# Patient Record
Sex: MALE | HISPANIC OR LATINO | Employment: FULL TIME | URBAN - METROPOLITAN AREA
[De-identification: names, ages, dates, MRNs, and addresses within clinical notes are randomized per-mention and may not be internally consistent; named-entity substitution may affect disease eponyms.]

---

## 2017-01-20 ENCOUNTER — TRANSFERRED RECORDS (OUTPATIENT)
Dept: HEALTH INFORMATION MANAGEMENT | Facility: CLINIC | Age: 47
End: 2017-01-20

## 2017-01-23 ENCOUNTER — HOSPITAL ENCOUNTER (OUTPATIENT)
Dept: PHYSICAL THERAPY | Facility: CLINIC | Age: 47
Setting detail: THERAPIES SERIES
End: 2017-01-23
Attending: PHYSICIAN ASSISTANT
Payer: COMMERCIAL

## 2017-01-23 PROCEDURE — 40000718 ZZHC STATISTIC PT DEPARTMENT ORTHO VISIT: Performed by: PHYSICAL THERAPIST

## 2017-01-23 PROCEDURE — 97162 PT EVAL MOD COMPLEX 30 MIN: CPT | Mod: GP | Performed by: PHYSICAL THERAPIST

## 2017-01-23 PROCEDURE — 97110 THERAPEUTIC EXERCISES: CPT | Mod: GP | Performed by: PHYSICAL THERAPIST

## 2017-01-23 PROCEDURE — 97140 MANUAL THERAPY 1/> REGIONS: CPT | Mod: GP | Performed by: PHYSICAL THERAPIST

## 2017-02-03 ENCOUNTER — HOSPITAL ENCOUNTER (OUTPATIENT)
Dept: PHYSICAL THERAPY | Facility: CLINIC | Age: 47
Setting detail: THERAPIES SERIES
End: 2017-02-03
Attending: PHYSICIAN ASSISTANT
Payer: COMMERCIAL

## 2017-02-03 PROCEDURE — 97140 MANUAL THERAPY 1/> REGIONS: CPT | Mod: GP | Performed by: PHYSICAL THERAPIST

## 2017-02-03 PROCEDURE — 40000718 ZZHC STATISTIC PT DEPARTMENT ORTHO VISIT: Performed by: PHYSICAL THERAPIST

## 2017-02-10 ENCOUNTER — HOSPITAL ENCOUNTER (OUTPATIENT)
Dept: PHYSICAL THERAPY | Facility: CLINIC | Age: 47
Setting detail: THERAPIES SERIES
End: 2017-02-10
Attending: PHYSICIAN ASSISTANT
Payer: COMMERCIAL

## 2017-02-10 PROCEDURE — 97140 MANUAL THERAPY 1/> REGIONS: CPT | Mod: GP | Performed by: PHYSICAL THERAPIST

## 2017-02-10 PROCEDURE — 40000718 ZZHC STATISTIC PT DEPARTMENT ORTHO VISIT: Performed by: PHYSICAL THERAPIST

## 2017-02-27 ENCOUNTER — OFFICE VISIT (OUTPATIENT)
Dept: FAMILY MEDICINE | Facility: CLINIC | Age: 47
End: 2017-02-27
Payer: COMMERCIAL

## 2017-02-27 VITALS
BODY MASS INDEX: 24.8 KG/M2 | DIASTOLIC BLOOD PRESSURE: 89 MMHG | HEIGHT: 67 IN | WEIGHT: 158 LBS | HEART RATE: 104 BPM | SYSTOLIC BLOOD PRESSURE: 134 MMHG | TEMPERATURE: 98 F

## 2017-02-27 DIAGNOSIS — M54.42 LEFT-SIDED LOW BACK PAIN WITH LEFT-SIDED SCIATICA, UNSPECIFIED CHRONICITY: Primary | ICD-10-CM

## 2017-02-27 DIAGNOSIS — F33.1 MAJOR DEPRESSIVE DISORDER, RECURRENT EPISODE, MODERATE (H): ICD-10-CM

## 2017-02-27 DIAGNOSIS — M25.512 BILATERAL SHOULDER PAIN, UNSPECIFIED CHRONICITY: ICD-10-CM

## 2017-02-27 DIAGNOSIS — M25.511 BILATERAL SHOULDER PAIN, UNSPECIFIED CHRONICITY: ICD-10-CM

## 2017-02-27 DIAGNOSIS — F07.81 POSTCONCUSSION SYNDROME: ICD-10-CM

## 2017-02-27 DIAGNOSIS — M54.6 BILATERAL THORACIC BACK PAIN, UNSPECIFIED CHRONICITY: ICD-10-CM

## 2017-02-27 DIAGNOSIS — T14.90XA TRAUMATIC INJURY: ICD-10-CM

## 2017-02-27 DIAGNOSIS — M54.2 NECK PAIN: ICD-10-CM

## 2017-02-27 PROCEDURE — 99215 OFFICE O/P EST HI 40 MIN: CPT | Performed by: PHYSICIAN ASSISTANT

## 2017-02-27 RX ORDER — DULOXETIN HYDROCHLORIDE 30 MG/1
30 CAPSULE, DELAYED RELEASE ORAL DAILY
Qty: 30 CAPSULE | Refills: 0 | Status: SHIPPED | OUTPATIENT
Start: 2017-02-27 | End: 2017-11-20

## 2017-02-27 RX ORDER — AMITRIPTYLINE HYDROCHLORIDE 50 MG/1
50 TABLET ORAL AT BEDTIME
Qty: 30 TABLET | Refills: 11 | Status: SHIPPED | OUTPATIENT
Start: 2017-02-27 | End: 2019-01-21

## 2017-02-27 ASSESSMENT — ANXIETY QUESTIONNAIRES
1. FEELING NERVOUS, ANXIOUS, OR ON EDGE: SEVERAL DAYS
5. BEING SO RESTLESS THAT IT IS HARD TO SIT STILL: SEVERAL DAYS
2. NOT BEING ABLE TO STOP OR CONTROL WORRYING: NOT AT ALL
3. WORRYING TOO MUCH ABOUT DIFFERENT THINGS: SEVERAL DAYS
7. FEELING AFRAID AS IF SOMETHING AWFUL MIGHT HAPPEN: MORE THAN HALF THE DAYS
6. BECOMING EASILY ANNOYED OR IRRITABLE: SEVERAL DAYS
GAD7 TOTAL SCORE: 7

## 2017-02-27 ASSESSMENT — PATIENT HEALTH QUESTIONNAIRE - PHQ9: 5. POOR APPETITE OR OVEREATING: SEVERAL DAYS
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
## 2017-02-27 NOTE — NURSING NOTE
"Chief Complaint   Patient presents with     Back Pain       Initial /89 (BP Location: Right arm, Patient Position: Chair, Cuff Size: Adult Regular)  Pulse 104  Temp 98  F (36.7  C) (Tympanic)  Ht 5' 7\" (1.702 m)  Wt 158 lb (71.7 kg)  BMI 24.75 kg/m2 Estimated body mass index is 24.75 kg/(m^2) as calculated from the following:    Height as of this encounter: 5' 7\" (1.702 m).    Weight as of this encounter: 158 lb (71.7 kg).  Medication Reconciliation: complete    Health Maintenance that is potentially due pending provider review:  NONE    Deisy SANTACRUZ MA        "

## 2017-02-27 NOTE — PATIENT INSTRUCTIONS
Increase dose of amitriptyline - helps sleep, pain and tight muscles  Continue with Physical Therapy   Set up MRI   Await call to set up appt with physiatrist (back specialist) in [place]  See me in about a month to further see what we can do to get you better

## 2017-02-28 ASSESSMENT — PATIENT HEALTH QUESTIONNAIRE - PHQ9: SUM OF ALL RESPONSES TO PHQ QUESTIONS 1-9: 21

## 2017-02-28 ASSESSMENT — ANXIETY QUESTIONNAIRES: GAD7 TOTAL SCORE: 7

## 2017-03-03 ENCOUNTER — HOSPITAL ENCOUNTER (OUTPATIENT)
Dept: MRI IMAGING | Facility: CLINIC | Age: 47
Discharge: HOME | End: 2017-03-03
Attending: PHYSICIAN ASSISTANT | Admitting: PHYSICIAN ASSISTANT
Payer: COMMERCIAL

## 2017-03-03 DIAGNOSIS — M54.42 LEFT-SIDED LOW BACK PAIN WITH LEFT-SIDED SCIATICA, UNSPECIFIED CHRONICITY: ICD-10-CM

## 2017-03-03 PROCEDURE — 72148 MRI LUMBAR SPINE W/O DYE: CPT

## 2017-03-06 NOTE — PROGRESS NOTES
[facility],    Your MRI of your back looks ok.  Some mild arthritis changes.  Keep appointment with the specialist.    Please call clinic at [phone] if you have questions.    Abigail Gregory PA-C

## 2017-03-07 NOTE — PROGRESS NOTES
Called patient, advised of results and recommendations.  Patient understood.  He did see the specialist, was stated he has a dislocated pelvis.  Is going to be doing some PT and will also be sent to another specialist in [place].  Deisy SANTACRUZ MA

## 2017-03-17 ENCOUNTER — HOSPITAL ENCOUNTER (OUTPATIENT)
Dept: PHYSICAL THERAPY | Facility: CLINIC | Age: 47
Setting detail: THERAPIES SERIES
End: 2017-03-17
Attending: PHYSICIAN ASSISTANT
Payer: COMMERCIAL

## 2017-03-17 PROCEDURE — 40000718 ZZHC STATISTIC PT DEPARTMENT ORTHO VISIT: Performed by: PHYSICAL THERAPIST

## 2017-03-17 PROCEDURE — 97535 SELF CARE MNGMENT TRAINING: CPT | Mod: GP | Performed by: PHYSICAL THERAPIST

## 2017-03-17 PROCEDURE — 97140 MANUAL THERAPY 1/> REGIONS: CPT | Mod: GP | Performed by: PHYSICAL THERAPIST

## 2017-03-31 ENCOUNTER — HOSPITAL ENCOUNTER (OUTPATIENT)
Dept: PHYSICAL THERAPY | Facility: CLINIC | Age: 47
Setting detail: THERAPIES SERIES
End: 2017-03-31
Attending: PHYSICIAN ASSISTANT
Payer: COMMERCIAL

## 2017-03-31 PROCEDURE — 40000718 ZZHC STATISTIC PT DEPARTMENT ORTHO VISIT: Performed by: PHYSICAL THERAPIST

## 2017-03-31 PROCEDURE — 97140 MANUAL THERAPY 1/> REGIONS: CPT | Mod: GP | Performed by: PHYSICAL THERAPIST

## 2017-03-31 PROCEDURE — 97110 THERAPEUTIC EXERCISES: CPT | Mod: GP | Performed by: PHYSICAL THERAPIST

## 2017-03-31 PROCEDURE — 97162 PT EVAL MOD COMPLEX 30 MIN: CPT | Mod: GP | Performed by: PHYSICAL THERAPIST

## 2017-03-31 PROCEDURE — 97161 PT EVAL LOW COMPLEX 20 MIN: CPT | Mod: GP | Performed by: PHYSICAL THERAPIST
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
## 2017-05-15 ENCOUNTER — HOSPITAL ENCOUNTER (OUTPATIENT)
Dept: RADIOLOGY | Facility: CLINIC | Age: 47
Discharge: HOME | End: 2017-05-15
Attending: PHYSICAL MEDICINE & REHABILITATION | Admitting: PHYSICAL MEDICINE & REHABILITATION
Payer: COMMERCIAL

## 2017-05-15 VITALS — OXYGEN SATURATION: 98 % | DIASTOLIC BLOOD PRESSURE: 96 MMHG | SYSTOLIC BLOOD PRESSURE: 133 MMHG | HEART RATE: 84 BPM

## 2017-05-15 DIAGNOSIS — M47.9 SPONDYLARTHROSIS: ICD-10-CM

## 2017-05-15 PROCEDURE — 25500064 ZZH RX 255 OP 636: Performed by: NURSE ANESTHETIST, CERTIFIED REGISTERED

## 2017-05-15 PROCEDURE — 27210202

## 2017-05-15 PROCEDURE — S0020 INJECTION, BUPIVICAINE HYDRO: HCPCS | Performed by: NURSE ANESTHETIST, CERTIFIED REGISTERED

## 2017-05-15 PROCEDURE — 25000125 ZZHC RX 250: Performed by: NURSE ANESTHETIST, CERTIFIED REGISTERED

## 2017-05-15 RX ORDER — BUPIVACAINE HYDROCHLORIDE 7.5 MG/ML
5 INJECTION, SOLUTION EPIDURAL; RETROBULBAR ONCE
Status: COMPLETED | OUTPATIENT
Start: 2017-05-15 | End: 2017-05-15

## 2017-05-15 RX ORDER — IOPAMIDOL 408 MG/ML
10 INJECTION, SOLUTION INTRATHECAL ONCE
Status: COMPLETED | OUTPATIENT
Start: 2017-05-15 | End: 2017-05-15

## 2017-05-15 RX ORDER — LIDOCAINE HYDROCHLORIDE 10 MG/ML
5 INJECTION, SOLUTION EPIDURAL; INFILTRATION; INTRACAUDAL; PERINEURAL ONCE
Status: COMPLETED | OUTPATIENT
Start: 2017-05-15 | End: 2017-05-15

## 2017-05-15 RX ORDER — DEXAMETHASONE SODIUM PHOSPHATE 10 MG/ML
20 INJECTION, SOLUTION INTRAMUSCULAR; INTRAVENOUS ONCE
Status: COMPLETED | OUTPATIENT
Start: 2017-05-15 | End: 2017-05-15

## 2017-05-15 RX ADMIN — DEXAMETHASONE SODIUM PHOSPHATE 10 MG: 10 INJECTION, SOLUTION INTRAMUSCULAR; INTRAVENOUS at 14:36

## 2017-05-15 RX ADMIN — IOPAMIDOL 1 ML: 408 INJECTION, SOLUTION INTRATHECAL at 14:36

## 2017-05-15 RX ADMIN — BUPIVACAINE HYDROCHLORIDE 1.5 ML: 7.5 INJECTION, SOLUTION EPIDURAL; RETROBULBAR at 14:36

## 2017-05-15 RX ADMIN — LIDOCAINE HYDROCHLORIDE 50 MG: 10 INJECTION, SOLUTION EPIDURAL; INFILTRATION; INTRACAUDAL; PERINEURAL at 14:36

## 2017-05-16 NOTE — OP NOTE
DATE OF PROCEDURE:  05/15/2017      POSTOPERATIVE DIAGNOSIS:  Cervical spondylosis.      POST-PROCEDURE DIAGNOSIS:  Cervical spondylosis.      CURRENT SYMPTOMS:  Dnan Castillo is a 46-year-old gentleman with right-sided upper neck pain and headaches.  He presents today at the request of his physician, Dr. Jatinder Oh for right third occipital nerve block and right C3 and C4 medial branch nerve blocks.      Today patient's pain in the upper neck and right occipital region is 9/10.      DESCRIPTION OF PROCEDURE:  In  the prone position after sterile chlorhexidine, alcohol and Betadine solution prep, the skin was localized with 1% preservative-free Xylocaine.  The 3.5-inch 22-gauge spinal needles were inserted into the bony landmarks identifying the course of the right third occipital nerve and the right C3 and right C4 medial branch nerves.  Correct needle position was confirmed in AP and lateral views.  Following negative aspiration for CSF, heme, and with no paresthesia, a small amount of Isovue 200 M was injected with good field block and no vascular uptake  A 0.5  mL of 0.75% preservative-free bupivacaine was then injected at each of the 3 sites.  Approximately 3.3 mg of preservative-free dexamethasone was also injected at each of the 3 sites.  Needles were removed and sterile bandage was applied.  Patient tolerated the procedure well.      Discharge instructions were thoroughly reviewed and a written copy of those instructions was sent home with him.      CONCLUSION:  Right third occipital nerve block and right C3 and C4 medial branch nerve blocks under fluoroscopic guidance.      Again, patient's preprocedure upper neck pain on the right and occipital headache were rated at a 9/10 prior to the procedure.      Immediately following the procedure, he stated his headache was gone.  He rated his pain at 0/10.  He was instructed to keep detailed pain diary documenting the local anesthetic phase of the injection every  hour for the rest of the afternoon and evening and report his results to Dr. Oh at [facility].         MATEO SUH CRNA             D: 05/15/2017 14:41   T: 2017 00:17   MT: [id number]      Name:     ROSY ANDERSON   MRN:      [id number]        Account:        [id number]   :      1970           Procedure Date: 05/15/2017      Document: [id number]       cc: Jatinder Oh MD
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
## 2017-06-05 NOTE — PROGRESS NOTES
Outpatient Physical Therapy Discharge Note     Patient: Dann Castillo  : 1970    Beginning/End Dates of Reporting Period:  17 to 2017    Referring Provider: CARLOS Cornelius    Therapy Diagnosis: neck pain     Client Self Report: Pt reports LBP is still not good. It gave out on him on  and he fell on R side. 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 He reports he heard a pop and now his knee is giving out on him. Pt reports pelvis is  and he may need surgery.  He does have an order for PT. Also supposed to have an injection in low back. Upper back and mid back is not nearly as bad as the rest of his body. Pt reports he does have WC hearing on 3/24/17    Objective Measurements:  Objective Measure: cervical ROM  Details: R rot:  60 L rot 65 - visual estimate     Objective Measure: NDI     Objective Measure: UE ROM  Details: Flex: 160. abd 170     Objective Measure: qucik ligament screen  Details: anterior drawer: pos, posterior drawer: neg, varus: pos, valgus: pos                      Goals:  Goal Identifier sleeping - stomach    Goal Description Pt will wake up less than 2x/ per night due to neck/back pain in order to get a restful night's sleep.   Target Date 17   Date Met      Progress:not met     Goal Identifier ROM   Goal Description Pt will demonstrate 75 deg bi of cervical rot in order to be able to safely turn head to drive car safely.    Target Date 17   Date Met      Progress:not met     Goal Identifier HA   Goal Description Pt will report having less than 1 HA/migraine per week to return to PLOF and improve concentration.    Target Date 17   Date Met      Progress:not assessed at last visit      Goal Identifier NDI   Goal Description Pt will report <10% disability on NDI to demonstrate improved ability to complete daily activities and demonstrate significant clinical improvement   Target Date 17   Date  Met      Progress:not assessed at last visit        Progress Toward Goals:   Progress this reporting period: Pt was seen for 3 visits in physical therapy over this POC. Objective measures are all taken from last visit. Current status is unknown at this time. Pt has failed to schedule f/u visits within 30 days from last visit as instructed thus is being d/c from therapy at this time. Per chart review, pt did have cervical injection.         Plan:  Discharge from therapy.    Discharge:    Reason for Discharge: Patient has failed to schedule further appointments.    Equipment Issued: NA    Discharge Plan: Patient to continue home program.    Jacqueline Ruiz  Physical Therapist  [facility]  [address] [place] [id number]  [email or web address]   [email or web address]   Office: [phone] Fax: [phone]

## 2017-06-05 NOTE — PROGRESS NOTES
Outpatient Physical Therapy Discharge Note     Patient: Dann Castillo  : 1970    Evaluation date:  3/31/17    Referring Provider: Abigail Gregory PA-C    Therapy Diagnosis:    decreased strength, limited ROM and pain in LBP associated w mechanical dysfcnction         Client Self Report:   Current status is unknown since patient did not return for further PT visits.    Objective Measurements:  Current objective status is unknown since patient failed to complete treatment     Goals:  Goal Identifier lumbar ROM   Goal Description Pt will be able to demonstrate full lumbar ROM in order to be able to  object off of the ground without pain or radicular symptoms.   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    Target Date 17   Date Met      Progress:unable to assess as pt failed to schedule f/u visits       Progress Toward Goals:   Progress this reporting period: Progress this reporting period: Pt has failed to schedule f/u visits within 30 days from last visit thus is being d/c from therapy at this time. Objective measures are all taken from last visit. Current status is unknown at this time.          Plan:  Discharge from therapy.    Discharge:    Reason for Discharge: Patient has failed to schedule further appointments.    Equipment Issued: none    Discharge Plan:  Not completed since patient failed to schedule further appointments.    Jacqueline Ruiz  Physical Therapist  #[id number]  [address] [address]  [email or web address]   [email or web address]   Office: [phone] Fax: [phone]

## 2017-08-08 ENCOUNTER — TELEPHONE (OUTPATIENT)
Dept: ANESTHESIOLOGY | Facility: CLINIC | Age: 47
End: 2017-08-08

## 2017-08-08 NOTE — TELEPHONE ENCOUNTER
LPN read through the Pre-procedure instructions with pt.   Pt verbalized understanding to holding appropriate medication per protocol, and was agreeable to NPO policy and needing a .  Pt informed to hold Aleve and Excedrin for 7 days before procedure per protocol.   Address was confirmed and instructions were placed in the mail to pt.   Pt was asked to contact the clinic after they received the instructions in the mail, if they were to have any questions.     Britney Clark LPN

## 2017-08-17 DIAGNOSIS — M47.812 CERVICAL SPONDYLOSIS WITHOUT MYELOPATHY: Primary | ICD-10-CM

## 2017-08-25 ENCOUNTER — SURGERY (OUTPATIENT)
Age: 47
End: 2017-08-25

## 2017-08-25 ENCOUNTER — HOSPITAL ENCOUNTER (OUTPATIENT)
Facility: AMBULATORY SURGERY CENTER | Age: 47
End: 2017-08-25
Attending: ANESTHESIOLOGY

## 2017-08-25 VITALS
HEIGHT: 67 IN | WEIGHT: 159 LBS | DIASTOLIC BLOOD PRESSURE: 79 MMHG | OXYGEN SATURATION: 98 % | SYSTOLIC BLOOD PRESSURE: 111 MMHG | TEMPERATURE: 97.5 F | HEART RATE: 66 BPM | BODY MASS INDEX: 24.96 KG/M2 | RESPIRATION RATE: 16 BRPM

## 2017-08-25 RX ORDER — BUPIVACAINE HYDROCHLORIDE 2.5 MG/ML
INJECTION, SOLUTION INFILTRATION; PERINEURAL PRN
Status: DISCONTINUED | OUTPATIENT
Start: 2017-08-25 | End: 2017-08-25 | Stop reason: HOSPADM

## 2017-08-25 RX ORDER — LIDOCAINE HYDROCHLORIDE 10 MG/ML
INJECTION, SOLUTION EPIDURAL; INFILTRATION; INTRACAUDAL; PERINEURAL PRN
Status: DISCONTINUED | OUTPATIENT
Start: 2017-08-25 | End: 2017-08-25 | Stop reason: HOSPADM

## 2017-08-25 RX ORDER — NALOXONE HYDROCHLORIDE 0.4 MG/ML
.1-.4 INJECTION, SOLUTION INTRAMUSCULAR; INTRAVENOUS; SUBCUTANEOUS
Status: DISCONTINUED | OUTPATIENT
Start: 2017-08-25 | End: 2017-08-26 | Stop reason: HOSPADM

## 2017-08-25 RX ORDER — FLUMAZENIL 0.1 MG/ML
0.2 INJECTION, SOLUTION INTRAVENOUS
Status: DISCONTINUED | OUTPATIENT
Start: 2017-08-25 | End: 2017-08-26 | Stop reason: HOSPADM

## 2017-08-25 RX ORDER — FENTANYL CITRATE 50 UG/ML
25-50 INJECTION, SOLUTION INTRAMUSCULAR; INTRAVENOUS EVERY 5 MIN PRN
Status: DISCONTINUED | OUTPATIENT
Start: 2017-08-25 | End: 2017-08-26 | Stop reason: HOSPADM

## 2017-08-25 RX ADMIN — FENTANYL CITRATE 25 MCG: 50 INJECTION, SOLUTION INTRAMUSCULAR; INTRAVENOUS at 08:46

## 2017-08-25 RX ADMIN — LIDOCAINE HYDROCHLORIDE 10 ML: 10 INJECTION, SOLUTION EPIDURAL; INFILTRATION; INTRACAUDAL; PERINEURAL at 09:03

## 2017-08-25 RX ADMIN — BUPIVACAINE HYDROCHLORIDE 3 ML: 2.5 INJECTION, SOLUTION INFILTRATION; PERINEURAL at 09:04

## 2017-08-25 NOTE — IP AVS SNAPSHOT
[facility]    [address] [address]    Phone:  [phone]    Fax:  [phone]                                       After Visit Summary   8/25/2017    Ed Jonathan    MRN: [id number]           After Visit Summary Signature Page     I have received my discharge instructions, and my questions have been answered. I have discussed any challenges I see with this plan with the nurse or doctor.    ..........................................................................................................................................  Patient/Patient Representative Signature      ..........................................................................................................................................  Patient Representative Print Name and Relationship to Patient    ..................................................               ................................................  Date                                            Time    ..........................................................................................................................................  Reviewed by Signature/Title    ...................................................              ..............................................  Date                                                            Time

## 2017-08-25 NOTE — H&P
"Abbreviated History and Physical    Patient Name: Dann Castillo  YOB: 1970    Reason for Procedure: cervicalgia    History:    History reviewed. No pertinent past medical history.    History of obstructive sleep apnea? no    History of problems with sedation? no    Physical exam:  /79  Pulse 66  Temp 97.5  F (36.4  C) (Temporal)  Resp 16  Ht 1.702 m (5' 7\")  Wt 72.1 kg (159 lb)  SpO2 98%  BMI 24.9 kg/m2  General: in no apparent distress   Neuro: At least antigravity strength noted in all 4 extremities  Respiratory: Clear to ausculation bilaterally   Cardiac: Regular rate and rhythm  Skin: No rashes or lesions on exposed areas of skin    Medications:   Current Outpatient Prescriptions   Medication     amitriptyline (ELAVIL) 50 MG tablet     DULoxetine (CYMBALTA) 30 MG EC capsule     nabumetone (RELAFEN) 750 MG tablet     Naproxen Sodium (ALEVE PO)     HYDROcodone-acetaminophen (NORCO) 5-325 MG per tablet     hydrOXYzine (ATARAX) 25 MG tablet     sildenafil (VIAGRA) 100 MG tablet     cyclobenzaprine (FLEXERIL) 10 MG tablet     traMADol (ULTRAM) 50 MG tablet     acetaminophen-caffeine (EXCEDRIN TENSION HEADACHE) 500-65 MG TABS     fexofenadine (ALLEGRA) 180 MG tablet     diphenhydrAMINE-zinc acetate (BENADRYL) cream     cetirizine (ZYRTEC) 10 MG tablet     albuterol-ipratropium (COMBIVENT)  MCG/ACT inhaler     Menthol, Topical Analgesic, (BENGAY EX)     EPINEPHrine (EPIPEN) 0.3 MG/0.3ML injection     Current Facility-Administered Medications   Medication     midazolam (VERSED) injection 0.5-1 mg     flumazenil (ROMAZICON) injection 0.2 mg     fentaNYL (PF) (SUBLIMAZE) injection 25-50 mcg     naloxone (NARCAN) injection 0.1-0.4 mg       Allergies:     Allergies   Allergen Reactions     Nka [No Known Allergies]        ASA Classification: 2    OK for local anesthetic use.     Art Car MD  Pain Medicine  [facility] Department of Anesthesia  8/25/2017        "
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
## 2017-08-25 NOTE — OP NOTE
Patient: Dann Castillo Age: 47 year old   MRN: [id number] Attending: Dr. Car     Date of Visit: August 25, 2017      PAIN MEDICINE CLINIC PROCEDURE NOTE    ATTENDING CLINICIAN:    Art Car MD    ASSISTANT CLINICIAN:  Moo Lilly DO    PREPROCEDURE DIAGNOSES:  1.  Cervical facet arthropathy   2.  Cervicalgia  3.  Neck pain       POSTPROCEDURE DIAGNOSES:  1.  Cervical facet arthropathy   2.  Cervicalgia  3.  Neck pain       PROCEDURE(S) PERFORMED:  1.  C3, C4 medial branch nerve radiofrequency ablation    2.  Third occipital nerve block radiofrequency ablation   3.  Fluoroscopic guidance for the above procedures    ANESTHESIA:  Local.    BLOOD LOSS:  Minimal.    DRAINS AND SPECIMENS:  None.    COMPLICATIONS:  None.    INDICATIONS:  Dann Castillo is a 47 year old male with a history of cervical facet arthropathy. Recent MRI shows at C2-C3 There is facet arthropathy bilaterally, uncinate hypertrophy bilaterally and a posterior broad-based disc-osteophyte complex. There is no spinal canal or neural foraminal stenosis at this level. At C3-C4: There is facet arthropathy bilaterally, uncinate hypertrophy bilaterally and a posterior broad-based disc-osteophyte complex. There is no spinal canal or neural foraminal stenosis at this level. At C4-C5: There is facet arthropathy bilaterally, uncinate hypertrophy bilaterally and a posterior broad-based disc-osteophyte complex. These findings result in mild bilateral neural foraminal narrowing but no spinal canal stenosis. She has good pain relief with cervical medial branch nerve block performed at [facility]SH .  The patient stated that the patient was in their usual state of health and denied recent anticoagulant use or recent infections.  Therefore, the plan is to perform above mentioned procedures.     Procedure Details:  The patient was met in the procedure room, where the patient was identified by name, medical record number and date of birth.  All of the patient s last minute  questions were answered. Written informed consent was obtained and saved in the electronic medical record, after the risks, benefits, and alternatives were discussed with the patient.      A formal time-out procedure was performed as per protocol, including patient name, title of procedure, and site of procedure, and all in the room concurred.  Routine monitors were applied.      The patient was placed in the prone position on the procedure room table.  All pressure points were checked and comfortably padded.  Routine monitors were placed.  Vital signs were stable.    A chlorhexidine prep was completed followed by sterile draping per standard procedure.     Using C-arm AP fluoroscopy we identified the C2, C3, and C4 articular pillars on the right. Using a 25G, 1.5 inch needle, the skin was anesthetized over the planned insertion sites. Then a 20G, 100-mm radiofrequency cannula- 10mm active tip was advanced percutaneously from a posterior approach until the needle tip contacted the lateral aspect of the C4 articular pillar. This was confirmed on AP views under flouroscopy. The needle was then advanced to the center of the  articular pillar along the C4 medial branch nerve. This was confirmed with lateral views under flouroscopy. We then repeated the above placement of another needle at the C3 level. The third occipital nerve was targeted in a similar fashion at the C2/3 facet joint line.  We then checked the sensory stimulation. The stimulator was set to 50 hz and the voltage was slowly increased from 0 to 1.0 volts until pain/sensation was reproduced in the pt's neck. Once the sensory test was passed, the motor testing was performed. The hertz was adjusted to 2 and the voltage was slowly increased from 0 to 3.0 while monitoring the patients arm and face for any twitching/movement/pain. Once this was confirmed of no twitching in the arm or face and only in the multifidus muscle in the neck, then Lidocaine 2% and  bupivacaine 0.5% 50:50 mix, 0.5ml was injected and the probe replaced.  The probes were then heated to 80C for 90 seconds. The needles were then rotated 180 degrees and again heated to 80C for 90 seconds. After RF ablation, we injected 1 ml of solution containing 1 ml of 10mg/ml dexamethasone and 3 ml of 0.25% bupivacaine. The needles were removed. The skin was cleaned and band-aids were placed.   Light pressure was held at the puncture site(s) to prevent ecchymosis and oozing.  The patient's skin was cleansed, and hemostasis was confirmed.  Band-aids were applied to the needle injection site(s).      Condition:    The patient remained awake and alert throughout the procedure.  The patient tolerated the procedure well and was monitored for approximately 15 minutes afterward in the post procedure area.  There were no immediate post procedure complications noted.  The patient was then discharged to home as per protocol.  The patient will follow up in the outpatient clinic in 4 week(s), unless otherwise clinically indicated.    Patient condition  stable.    Pre-procedure pain score: 6/10  Post-procedure pain score: 0/10        Sedation:      Performed by: Art Car    Indication:  Sedation is required to allow above mentioned procedure     Consent:  Consent obtained from the patient Ed Jonathan after discussing the risks, benefits and alternatives.    PO Intake:  Appropriately NPO for procedure    Lungs: Lungs Clear with good breath sounds bilaterally.     Heart: Normal heart sounds and rate    Focused history and physical completed prior to procedure. I have reviewed the lab findings, diagnostic data, medications, and the plan for sedation. I have determined this patient to be an appropriate candidate for the planned sedation and procedure and have reassessed the patient IMMEDIATELY PRIOR to sedation and procedure.      Sedation Post Procedure Summary:    Prior to the start of the procedure and with procedural  staff participation, I verbally confirmed the patient s identity using two indicators, relevant allergies, that the procedure was appropriate and matched the consent or emergent situation, and that the correct equipment/implants were available. Immediately prior to starting the procedure I conducted the Time Out with the procedural staff and re-confirmed the patient s name, procedure, and site/side. (The Joint Commission universal protocol was followed.)  Yes      Sedatives: Fentanyl and Midazolam (Versed)    Vital signs, airway, End Tidal CO2 and pulse oximetry were monitored and remained stable throughout the procedure and sedation was maintained until the procedure was complete.  The patient was monitored by staff until sedation discharge criteria were met.    Patient tolerance: Patient tolerated the procedure well with no immediate complications.    Time of sedation in minutes:  35 minutes from beginning to end of physician one to one monitoring.

## 2017-08-25 NOTE — DISCHARGE INSTRUCTIONS
Home Care Instructions after a Radio Frequency Ablation    A radiofrequency ablation refers to a procedure that destroys the functionality of the nerve using radiofrequency energy. There are two primary types of radiofrequency ablation: A medial branch neurotomy (ablation) affects the nerves carrying pain from the facet joints, while a lateral branch neurotomy (ablation) affects nerves that carry pain from the sacroiliac joints. The physician uses x-ray guidance (fluoroscopy) to direct a special (radiofrequency) needle alongside the medial or lateral branch nerves. A small amount of electrical current is often carefully passed through the needle to assure it is next to the target nerve and a safe distance from other nerves. This current should briefly recreate the usual pain and cause a muscle twitch in the neck or back. The targeted nerves will then be numbed to minimize pain while the lesion is being created. The radiofrequency waves are introduced to heat the tip of the needle and a heat lesion is created on the nerve to disrupt the nerve's ability to send pain signals. Please follow the aftercare instructions regarding your procedure.    Activity  -Rest today  -Do not work today  -Resume normal activity in 2-3 days  -No heavy lifting, turning or twisting for 24 hours  -DO NOT shower or soak in tub for 24 hours  -DO NOT remove bandaid for 24 hours    Pain  -You may experience soreness at the injection site for one or two days  -You may use an ice pack for 20 minutes every 2 hours for the first 24 hours, longer if needed for comfort, do not use heat  -You may use Tylenol (acetaminophen) every 4 hours or other pain medicines as     directed by your physician  -If other pain medications are prescribed by your physician, please follow dosing instructions carefully.    What to expect  You may experience numbness radiating into your legs or arms (depending on the procedure location). This numbness may last several  hours. Until sensation returns to normal, please use caution in walking, climbing stairs, and stepping out of your vehicle, etc. Relief of your initial symptoms can take up to 4 weeks to feel better, this is normal and due to the healing process. The procedure site may feel like a deep burn. Using ice will greatly minimize this discomfort. Do not use numbing creams or patches over your injection sites immediately following your procedure. Please keep injection sites covered, clean and dry for 24 hours.    DID YOU RECEIVE SEDATION TODAY?  Yes    Safety  Sedation medicine, if given, may remain active for many hours. It is important for the next 24 hours that you do not:  -Drive a car  -Operate machines or power tools  -Consume alcohol, including beer  -Sign any important papers or legal documents  -Please have a responsible adult with you for 24 hours following any sedation    DID YOU RECEIVE STEROIDS TODAY?  Yes    Common side effects of steroids:  Not everyone will experience corticosteroid side effects. If side effects are experienced, they will gradually subside in the 7-10 day period following an injection. Most common side effects include:  -Flushed face and/or chest  -Feeling of warmth, particularly in the face but could be an overall feeling of warmth  -Increased blood sugar in diabetic patients  -Menstrual irregularities my occur. If taking hormone-based birth control an alternate method of birth control is recommended  -Sleep disturbances and/or mood swings are possible  -Leg cramps      Please contact us if you have:  -Severe pain  -Fever more than 101.5 degrees Fahrenheit  -Signs of infection at the injection site (redness, swelling, or drainage)    If you have questions, please contact our office at [phone] between the hours of 7:00 am and 3:00 pm Monday through Friday. After office hours you can contact the on call provider by dialing [phone]. If you need immediate attention, we recommend that  you go to a hospital emergency room or dial 911.

## 2017-11-20 ENCOUNTER — HOSPITAL ENCOUNTER (EMERGENCY)
Facility: CLINIC | Age: 47
Discharge: HOME | End: 2017-11-21
Attending: EMERGENCY MEDICINE | Admitting: EMERGENCY MEDICINE
Payer: OTHER MISCELLANEOUS

## 2017-11-20 ENCOUNTER — TRANSFERRED RECORDS (OUTPATIENT)
Dept: HEALTH INFORMATION MANAGEMENT | Facility: CLINIC | Age: 47
End: 2017-11-20

## 2017-11-20 ENCOUNTER — APPOINTMENT (OUTPATIENT)
Dept: GENERAL RADIOLOGY | Facility: CLINIC | Age: 47
End: 2017-11-20
Attending: FAMILY MEDICINE
Payer: OTHER MISCELLANEOUS

## 2017-11-20 ENCOUNTER — HOSPITAL ENCOUNTER (EMERGENCY)
Facility: CLINIC | Age: 47
Discharge: HOME | End: 2017-11-20
Attending: FAMILY MEDICINE | Admitting: FAMILY MEDICINE
Payer: OTHER MISCELLANEOUS

## 2017-11-20 VITALS
TEMPERATURE: 98.2 F | SYSTOLIC BLOOD PRESSURE: 127 MMHG | WEIGHT: 162 LBS | RESPIRATION RATE: 18 BRPM | HEART RATE: 102 BPM | DIASTOLIC BLOOD PRESSURE: 90 MMHG | OXYGEN SATURATION: 94 % | BODY MASS INDEX: 25.37 KG/M2

## 2017-11-20 DIAGNOSIS — S61.315A LACERATION OF LEFT RING FINGER WITHOUT FOREIGN BODY WITH DAMAGE TO NAIL, INITIAL ENCOUNTER: ICD-10-CM

## 2017-11-20 DIAGNOSIS — S67.10XA CRUSHING INJURY OF FINGER OF LEFT HAND: ICD-10-CM

## 2017-11-20 DIAGNOSIS — S61.313A LACERATION OF LEFT MIDDLE FINGER WITHOUT FOREIGN BODY WITH DAMAGE TO NAIL, INITIAL ENCOUNTER: ICD-10-CM

## 2017-11-20 PROCEDURE — 64450 NJX AA&/STRD OTHER PN/BRANCH: CPT | Mod: Z6 | Performed by: EMERGENCY MEDICINE

## 2017-11-20 PROCEDURE — 99283 EMERGENCY DEPT VISIT LOW MDM: CPT | Mod: 25,27 | Performed by: EMERGENCY MEDICINE

## 2017-11-20 PROCEDURE — 64450 NJX AA&/STRD OTHER PN/BRANCH: CPT | Performed by: EMERGENCY MEDICINE

## 2017-11-20 PROCEDURE — 99284 EMERGENCY DEPT VISIT MOD MDM: CPT | Mod: 25 | Performed by: EMERGENCY MEDICINE

## 2017-11-20 PROCEDURE — 12042 INTMD RPR N-HF/GENIT2.6-7.5: CPT | Mod: Z6 | Performed by: FAMILY MEDICINE

## 2017-11-20 PROCEDURE — 99284 EMERGENCY DEPT VISIT MOD MDM: CPT | Mod: 25 | Performed by: FAMILY MEDICINE

## 2017-11-20 PROCEDURE — 25000125 ZZHC RX 250

## 2017-11-20 RX ORDER — BUPIVACAINE HYDROCHLORIDE 2.5 MG/ML
INJECTION, SOLUTION INFILTRATION; PERINEURAL
Status: DISCONTINUED
Start: 2017-11-20 | End: 2017-11-20 | Stop reason: HOSPADM

## 2017-11-20 RX ORDER — CEPHALEXIN 500 MG/1
500 CAPSULE ORAL 4 TIMES DAILY
Qty: 40 CAPSULE | Refills: 0 | Status: SHIPPED | OUTPATIENT
Start: 2017-11-20 | End: 2017-11-30

## 2017-11-20 RX ORDER — OXYCODONE AND ACETAMINOPHEN 5; 325 MG/1; MG/1
1-2 TABLET ORAL EVERY 6 HOURS PRN
Qty: 20 TABLET | Refills: 0 | Status: SHIPPED | OUTPATIENT
Start: 2017-11-20 | End: 2017-11-24

## 2017-11-20 RX ORDER — ONDANSETRON 2 MG/ML
INJECTION INTRAMUSCULAR; INTRAVENOUS
Status: COMPLETED
Start: 2017-11-20 | End: 2017-11-20

## 2017-11-20 RX ORDER — OXYCODONE AND ACETAMINOPHEN 5; 325 MG/1; MG/1
1-2 TABLET ORAL EVERY 6 HOURS PRN
Qty: 20 TABLET | Refills: 0 | Status: SHIPPED | OUTPATIENT
Start: 2017-11-20 | End: 2018-02-01

## 2017-11-20 RX ORDER — ONDANSETRON 2 MG/ML
4 INJECTION INTRAMUSCULAR; INTRAVENOUS ONCE
Status: COMPLETED | OUTPATIENT
Start: 2017-11-20 | End: 2017-11-20

## 2017-11-20 RX ORDER — HYDROMORPHONE HYDROCHLORIDE 1 MG/ML
INJECTION, SOLUTION INTRAMUSCULAR; INTRAVENOUS; SUBCUTANEOUS
Status: COMPLETED
Start: 2017-11-20 | End: 2017-11-20

## 2017-11-20 RX ORDER — HYDROMORPHONE HYDROCHLORIDE 1 MG/ML
0.5 INJECTION, SOLUTION INTRAMUSCULAR; INTRAVENOUS; SUBCUTANEOUS
Status: DISCONTINUED | OUTPATIENT
Start: 2017-11-20 | End: 2017-11-20 | Stop reason: HOSPADM

## 2017-11-20 RX ORDER — BUPIVACAINE HYDROCHLORIDE AND EPINEPHRINE 5; 5 MG/ML; UG/ML
INJECTION, SOLUTION PERINEURAL
Status: COMPLETED
Start: 2017-11-20 | End: 2017-11-20

## 2017-11-20 RX ADMIN — BUPIVACAINE HYDROCHLORIDE AND EPINEPHRINE BITARTRATE: 5; .005 INJECTION, SOLUTION PERINEURAL at 23:46

## 2017-11-20 RX ADMIN — HYDROMORPHONE HYDROCHLORIDE 0.5 MG: 1 INJECTION, SOLUTION INTRAMUSCULAR; INTRAVENOUS; SUBCUTANEOUS at 10:35

## 2017-11-20 RX ADMIN — CLOSTRIDIUM TETANI TOXOID ANTIGEN (FORMALDEHYDE INACTIVATED), CORYNEBACTERIUM DIPHTHERIAE TOXOID ANTIGEN (FORMALDEHYDE INACTIVATED), BORDETELLA PERTUSSIS TOXOID ANTIGEN (GLUTARALDEHYDE INACTIVATED), BORDETELLA PERTUSSIS FILAMENTOUS HEMAGGLUTININ ANTIGEN (FORMALDEHYDE INACTIVATED), BORDETELLA PERTUSSIS PERTACTIN ANTIGEN, AND BORDETELLA PERTUSSIS FIMBRIAE 2/3 ANTIGEN 0.5 ML: 5; 2; 2.5; 5; 3; 5 INJECTION, SUSPENSION INTRAMUSCULAR at 12:38

## 2017-11-20 RX ADMIN — HYDROMORPHONE HYDROCHLORIDE 1 MG: 1 INJECTION, SOLUTION INTRAMUSCULAR; INTRAVENOUS; SUBCUTANEOUS at 09:45

## 2017-11-20 RX ADMIN — Medication 1 MG: at 09:45

## 2017-11-20 RX ADMIN — SODIUM CHLORIDE 1000 ML: 9 INJECTION, SOLUTION INTRAVENOUS at 09:54

## 2017-11-20 RX ADMIN — ONDANSETRON 4 MG: 2 INJECTION INTRAMUSCULAR; INTRAVENOUS at 09:46

## 2017-11-20 RX ADMIN — CEFAZOLIN SODIUM 1 G: 1 INJECTION, SOLUTION INTRAVENOUS at 10:33

## 2017-11-20 NOTE — ED AVS SNAPSHOT
[facility] Emergency Department    [address] [address]    Phone:  [phone]    Fax:  [phone]                                       Dann Castillo   MRN: [id number]    Department:  [facility] Emergency Department   Date of Visit:  11/20/2017           After Visit Summary Signature Page     I have received my discharge instructions, and my questions have been answered. I have discussed any challenges I see with this plan with the nurse or doctor.    ..........................................................................................................................................  Patient/Patient Representative Signature      ..........................................................................................................................................  Patient Representative Print Name and Relationship to Patient    ..................................................               ................................................  Date                                            Time    ..........................................................................................................................................  Reviewed by Signature/Title    ...................................................              ..............................................  Date                                                            Time

## 2017-11-20 NOTE — ED AVS SNAPSHOT
[facility] Emergency Department    [address] [address]    Phone:  [phone]    Fax:  [phone]                                       Ed Jonathan   MRN: [id number]    Department:  [facility] Emergency Department   Date of Visit:  11/20/2017           Patient Information     Date Of Birth          1970        Your diagnoses for this visit were:     Laceration of left ring finger without foreign body with damage to nail, initial encounter Associated phalanx fracture open.    Laceration of left middle finger without foreign body with damage to nail, initial encounter Associated phalanx fracture open       You were seen by Franklin Gonzalez MD.      Follow-up Information     Follow up with Denise Lyle MD In 2 days.    Specialty:  Orthopedics    Contact information:    [facility]  [address]  [facility] [address]  [phone]          Discharge Instructions          * LACERATION (All Closures)  A laceration is a cut through the skin. This will usually require stitches (sutures) or staples if it is deep. Minor cuts may be treated with a tape closure ( Steri-Strips ) or Dermabond skin glue.       HOME CARE:  PAIN MEDICINE: You may use acetaminophen (Tylenol) 650-1000 mg every 6 hours or ibuprofen (Motrin, Advil) 600 mg every 6-8 hours with food to control pain, if you are able to take these medicines. [NOTE: If you have chronic liver or kidney disease or ever had a stomach ulcer or GI bleeding, talk with your doctor before using these medicines.]  EXTREMITY, FACE or TRUNK WOUNDS:    Keep the wound clean and dry. If a bandage was applied and it becomes wet or dirty, replace it. Otherwise, leave it in place for the first 24 hours.    If stitches or staples were used, clean the wound daily. Protect the wound from sunlight and tanning lamps.    After removing the bandage, wash the area with soap and water. Use a wet cotton swab (Q tip) to  loosen and remove any blood or crust that forms.    After cleaning, apply a thin layer of Polysporin or Bacitracin ointment. This will keep the wound clean and make it easier to remove the stitches or staples. Reapply a fresh bandage.    You may remove the bandage to shower as usual after the first 24 hours, but do not soak the area in water (no swimming) until the stitches or staples are removed.    If Steri-Strips were used, keep the area clean and dry. If it becomes wet, blot it dry with a towel. It is okay to take a brief shower, but avoid scrubbing the area.    If Dermabond skin adhesive was used, do not scratch, rub or pick at the adhesive film. Do not place tape directly over the film. Do not apply liquid, ointment or creams to the wound while the film is in place. Do not clean the wound with peroxide and do not apply ointments. Avoid activities that cause heavy sweating until the film has fallen off. Protect the wound from prolonged exposure to sunlight or tanning lamps. You may shower as usual but do not soak the wound in water (no baths or swimming). The film will fall off by itself in 5-10 days.  SCALP WOUNDS: During the first two days, you may carefully rinse your hair in the shower to remove blood, glass or dirt particles. After two days, you may shower and shampoo your hair normally. Do not soak your scalp in the tub or go swimming until the stitches or staples have been removed.  MOUTH WOUNDS: Eat soft foods to reduce pain. If the cut is inside of your mouth, clean by rinsing after each meal and at bedtime with a mixture of equal parts water and Hydrogen Peroxide (do not swallow!). Or, you can use a cotton swab to directly apply Hydrogen Peroxide onto the cut.  After the wound is done healing, use sunscreen over the area whenever exposed for the next 6 minths to avoid a darker scar.     FOLLOW UP: Most skin wounds heal within ten days. Mouth and facial wounds heal within five days. However, even with  proper treatment, a wound infection may sometimes occur. Therefore, you should check the wound daily for signs of infection listed below.  Stitches should be removed from the face within five days; stitches and staples should be removed from other parts of the body within 7-10 days. Unless you are told to come back to the emergency room, you may have your doctor or urgent care remove the stitches. If dissolving stitches were used in the mouth, these will fall out or dissolve without the need for removal. If tape closures ( Steri-Strips ) were used, remove them yourself if they have not fallen off after 7 days. If Dermabond skin glue was used, the film will fall off by itself in 5-10 days.      GET PROMPT MEDICAL ATTENTION  if any of the following occur:    Increasing pain in the wound    Redness, swelling or pus coming from the wound    Fever over 101 F (38.3 C) oral    If stitches or staples come apart or fall out or if Steri-Strips fall off before seven days    If the wound edges re-open    Bleeding not controlled by direct pressure    8898-9904 [employer]. [address] [place] [address]. All rights reserved. This information is not intended as a substitute for professional medical care. Always follow your healthcare professional's instructions.  This information has been modified by your health care provider with permission from the publisher.    Keep dressings intact for the next 48-72 hours.  Follow-up for review with hand surgery as discussed at your convenience within the next 48-72 hours.  Percocet as needed for pain.  Keflex 500 mg 4 times daily ×10 days.  Return to the emergency department if worse or changes.    24 Hour Appointment Hotline       To make an appointment at any [facility], call [phone]). If you don't have a family doctor or clinic, we will help you find one. [facility] clinics are conveniently located to serve the needs of you and your  family.             Review of your medicines      START taking        Dose / Directions Last dose taken    * cephALEXin 500 MG capsule   Commonly known as:  KEFLEX   Dose:  500 mg   Quantity:  40 capsule        Take 1 capsule (500 mg) by mouth 4 times daily for 10 days   Refills:  0        * cephALEXin 500 MG capsule   Commonly known as:  KEFLEX   Dose:  500 mg   Quantity:  40 capsule        Take 1 capsule (500 mg) by mouth 4 times daily for 10 days   Refills:  0        * oxyCODONE-acetaminophen 5-325 MG per tablet   Commonly known as:  PERCOCET   Dose:  1-2 tablet   Quantity:  20 tablet        Take 1-2 tablets by mouth every 6 hours as needed for moderate to severe pain   Refills:  0        * oxyCODONE-acetaminophen 5-325 MG per tablet   Commonly known as:  PERCOCET   Dose:  1-2 tablet   Quantity:  20 tablet        Take 1-2 tablets by mouth every 6 hours as needed for moderate to severe pain   Refills:  0        * Notice:  This list has 4 medication(s) that are the same as other medications prescribed for you. Read the directions carefully, and ask your doctor or other care provider to review them with you.      Our records show that you are taking the medicines listed below. If these are incorrect, please call your family doctor or clinic.        Dose / Directions Last dose taken    acetaminophen-caffeine 500-65 MG Tabs   Commonly known as:  EXCEDRIN TENSION HEADACHE   Dose:  3 tablet        Take 3 tablets by mouth every 6 hours as needed for mild pain   Refills:  0        albuterol-ipratropium  MCG/ACT Inhaler   Commonly known as:  COMBIVENT   Dose:  2 puff   Quantity:  1 Inhaler        Inhale 2 puffs into the lungs 4 times daily. Needs appointment   Refills:  11        ALEVE PO   Dose:  220 mg        Take 220 mg by mouth 2 times daily as needed for moderate pain   Refills:  0        amitriptyline 50 MG tablet   Commonly known as:  ELAVIL   Dose:  50 mg   Quantity:  30 tablet        Take 1 tablet (50 mg) by  mouth At Bedtime   Refills:  11        BENADRYL cream   Dose:  50 mg   Generic drug:  diphenhydrAMINE-zinc acetate        Apply 50 mg topically as needed.   Refills:  0        cetirizine 10 MG tablet   Commonly known as:  zyrTEC   Dose:  10 mg   Quantity:  60 tablet        Take 1 tablet by mouth 2 times daily.   Refills:  11        cyclobenzaprine 10 MG tablet   Commonly known as:  FLEXERIL   Dose:  5-10 mg   Quantity:  30 tablet        Take 0.5-1 tablets (5-10 mg) by mouth 3 times daily as needed for muscle spasms   Refills:  1        DULoxetine 30 MG EC capsule   Commonly known as:  CYMBALTA   Dose:  30 mg   Quantity:  30 capsule        Take 1 capsule (30 mg) by mouth daily   Refills:  0        EPINEPHrine 0.3 MG/0.3ML injection   Commonly known as:  EPIPEN   Dose:  0.3 mg        Inject 0.3 mg into the muscle once as needed.   Refills:  0        fexofenadine 180 MG tablet   Commonly known as:  ALLEGRA   Dose:  180 mg   Quantity:  30 tablet        Take 1 tablet by mouth daily. Take upon awakening.   Refills:  11        nabumetone 750 MG tablet   Commonly known as:  RELAFEN   Dose:  750 mg   Quantity:  60 tablet        Take 1 tablet (750 mg) by mouth 2 times daily as needed for moderate pain   Refills:  1        sildenafil 100 MG tablet   Commonly known as:  VIAGRA   Dose:   mg   Quantity:  6 tablet        Take 0.5-1 tablets ( mg) by mouth daily as needed for erectile dysfunction Take 30 min to 4 hours before intercourse.   Refills:  11        traMADol 50 MG tablet   Commonly known as:  ULTRAM   Dose:   mg   Quantity:  40 tablet        Take 1-2 tablets ( mg) by mouth every 6 hours as needed for moderate pain   Refills:  0                Prescriptions were sent or printed at these locations (4 Prescriptions)                   Other Prescriptions                Printed at Department/Unit printer (4 of 4)         oxyCODONE-acetaminophen (PERCOCET) 5-325 MG per tablet               cephALEXin  (KEFLEX) 500 MG capsule               cephALEXin (KEFLEX) 500 MG capsule               oxyCODONE-acetaminophen (PERCOCET) 5-325 MG per tablet                Procedures and tests performed during your visit     Fingers XR, 2-3 views, left      Orders Needing Specimen Collection     None      Pending Results     Date and Time Order Name Status Description    11/20/2017 0949 Fingers XR, 2-3 views, left Preliminary             Pending Culture Results     No orders found from 11/18/2017 to 11/21/2017.            Pending Results Instructions     If you had any lab results that were not finalized at the time of your Discharge, you can call the ED Lab Result RN at [phone]. You will be contacted by this team for any positive Lab results or changes in treatment. The nurses are available 7 days a week from 10A to 6:30P.  You can leave a message 24 hours per day and they will return your call.        Test Results From Your Hospital Stay        11/20/2017 10:31 AM      Narrative     FINGER LEFT TWO OR MORE VIEWS November 20, 2017 10:27 AM    HISTORY: X-ray middle and ring finger crush injury with laceration and  macerated tissue.     COMPARISON: None.        Impression     IMPRESSION: There are comminuted displaced fractures involving the  ac of the middle and ring finger distal phalanges. Deformed and  absent soft tissues of the distal middle finger.                 Thank you for choosing [facility]       Thank you for choosing [facility] for your care. Our goal is always to provide you with excellent care. Hearing back from our patients is one way we can continue to improve our services. Please take a few minutes to complete the written survey that you may receive in the mail after you visit with us. Thank you!        [employer] Information     [facility] gives you secure access to your electronic health record. If you see a primary care provider, you can also send messages to your care team and make appointments. If you have  questions, please call your primary care clinic.  If you do not have a primary care provider, please call [phone] and they will assist you.        Care EveryWhere ID     This is your Care EveryWhere ID. This could be used by other organizations to access your [facility] medical records  [id number]        Equal Access to Services     ELIZA WEBB : Yesenia Suarez, waaníbal chan, kenya plattaldhaval blackwell, tory lambert. So [facility] [phone].    ATENCIÓN: Si habla español, tiene a sultana disposición servicios gratuitos de asistencia lingüística. Llame al [phone].    We comply with applicable federal civil rights laws and [place] laws. We do not discriminate on the basis of race, color, national origin, age, disability, sex, sexual orientation, or gender identity.            After Visit Summary       This is your record. Keep this with you and show to your community pharmacist(s) and doctor(s) at your next visit.

## 2017-11-20 NOTE — ED AVS SNAPSHOT
[facility]    [address]    [facility] [place] [address]    Phone:  [phone]    Fax:  [phone]                                       Dann Castillo   MRN: [id number]    Department:  [facility]   Date of Visit:  11/20/2017           Patient Information     Date Of Birth          1970        Your diagnoses for this visit were:     Crushing injury of finger of left hand        You were seen by Farooq Benedict MD.        Discharge Instructions       Use topical antibiotic ointment and petroleum gauze as a ace later and then cover them with gauze and the splints for comfort.  Return to the emergency department for severe pain, spreading rash, fever, or other concerns.  Otherwise follow up in orthopedic surgery clinic.    Use ibuprofen 600 mg and acetaminophen 1 g for your symptoms.  Use oxycodone as needed for pain that is not controlled by the prior two medications.    Oxycodone is an addictive opioid medication, please only take it when the pain is more than can be controlled by acetaminophen or ibuprofen alone. It will also make you lightheaded, nauseated, and constipated.  Do not drive, operate heavy machinery, or take care of young children while taking this medication.     Repeated studies have shown that the longer you use opioid pain medications, the longer it is until you return to normal function. It is our recommendation that you taper off opioids as quickly as possible with the goal of returning to normal function or near normal function. Long term use of opioids quickly results in growing tolerance to the medication (less of the benefits) and increased dependence (more of the bad side effects).     Pain is very difficult to treat and we can very rarely take away pain completely. If you are having difficulty with pain over several weeks after an injury, you may need to start different medications and therapies (such as physical therapy,  graded exercise, massage, and acupuncture). Please talk about this with your regular doctor.     If you are interested in seeking free, confidential treatment referral and information service for individuals and families facing mental health and/or substance use disorders please call [phone])    24 Hour Appointment Hotline       To make an appointment at any [facility] clinic, call [phone] ([phone]). If you don't have a family doctor or clinic, we will help you find one. [facility] clinics are conveniently located to serve the needs of you and your family.             Review of your medicines      START taking        Dose / Directions Last dose taken    oxyCODONE IR 5 MG tablet   Commonly known as:  ROXICODONE   Dose:  5 mg   Quantity:  10 tablet        Take 1 tablet (5 mg) by mouth every 4 hours as needed for pain   Refills:  0          Our records show that you are taking the medicines listed below. If these are incorrect, please call your family doctor or clinic.        Dose / Directions Last dose taken    acetaminophen-caffeine 500-65 MG Tabs   Commonly known as:  EXCEDRIN TENSION HEADACHE   Dose:  3 tablet        Take 3 tablets by mouth every 6 hours as needed for mild pain   Refills:  0        albuterol-ipratropium  MCG/ACT Inhaler   Commonly known as:  COMBIVENT   Dose:  2 puff   Quantity:  1 Inhaler        Inhale 2 puffs into the lungs 4 times daily. Needs appointment   Refills:  11        ALEVE PO   Dose:  220 mg        Take 220 mg by mouth 2 times daily as needed for moderate pain   Refills:  0        amitriptyline 50 MG tablet   Commonly known as:  ELAVIL   Dose:  50 mg   Quantity:  30 tablet        Take 1 tablet (50 mg) by mouth At Bedtime   Refills:  11        BENADRYL cream   Dose:  50 mg   Generic drug:  diphenhydrAMINE-zinc acetate        Apply 50 mg topically as needed.   Refills:  0        * cephALEXin 500 MG capsule   Commonly known as:  KEFLEX   Dose:  500 mg    Quantity:  40 capsule        Take 1 capsule (500 mg) by mouth 4 times daily for 10 days   Refills:  0        * cephALEXin 500 MG capsule   Commonly known as:  KEFLEX   Dose:  500 mg   Quantity:  40 capsule        Take 1 capsule (500 mg) by mouth 4 times daily for 10 days   Refills:  0        cetirizine 10 MG tablet   Commonly known as:  zyrTEC   Dose:  10 mg   Quantity:  60 tablet        Take 1 tablet by mouth 2 times daily.   Refills:  11        EPINEPHrine 0.3 MG/0.3ML injection   Commonly known as:  EPIPEN   Dose:  0.3 mg        Inject 0.3 mg into the muscle once as needed.   Refills:  0        fexofenadine 180 MG tablet   Commonly known as:  ALLEGRA   Dose:  180 mg   Quantity:  30 tablet        Take 1 tablet by mouth daily. Take upon awakening.   Refills:  11        * oxyCODONE-acetaminophen 5-325 MG per tablet   Commonly known as:  PERCOCET   Dose:  1-2 tablet   Quantity:  20 tablet        Take 1-2 tablets by mouth every 6 hours as needed for moderate to severe pain   Refills:  0        * oxyCODONE-acetaminophen 5-325 MG per tablet   Commonly known as:  PERCOCET   Dose:  1-2 tablet   Quantity:  20 tablet        Take 1-2 tablets by mouth every 6 hours as needed for moderate to severe pain   Refills:  0        sildenafil 100 MG tablet   Commonly known as:  VIAGRA   Dose:   mg   Quantity:  6 tablet        Take 0.5-1 tablets ( mg) by mouth daily as needed for erectile dysfunction Take 30 min to 4 hours before intercourse.   Refills:  11        * Notice:  This list has 4 medication(s) that are the same as other medications prescribed for you. Read the directions carefully, and ask your doctor or other care provider to review them with you.            Prescriptions were sent or printed at these locations (1 Prescription)                   Other Prescriptions                Printed at Department/Unit printer (1 of 1)         oxyCODONE IR (ROXICODONE) 5 MG tablet                Orders Needing Specimen  Collection     None      Pending Results     No orders found for last 3 day(s).            Pending Culture Results     No orders found for last 3 day(s).            Pending Results Instructions     If you had any lab results that were not finalized at the time of your Discharge, you can call the ED Lab Result RN at [phone]. You will be contacted by this team for any positive Lab results or changes in treatment. The nurses are available 7 days a week from 10A to 6:30P.  You can leave a message 24 hours per day and they will return your call.        Test Results From Your Hospital Stay               Thank you for choosing [facility]       Thank you for choosing [facility] for your care. Our goal is always to provide you with excellent care. Hearing back from our patients is one way we can continue to improve our services. Please take a few minutes to complete the written survey that you may receive in the mail after you visit with us. Thank you!        [employer] Information     [employer] gives you secure access to your electronic health record. If you see a primary care provider, you can also send messages to your care team and make appointments. If you have questions, please call your primary care clinic.  If you do not have a primary care provider, please call [phone] and they will assist you.        Care EveryWhere ID     This is your Care EveryWhere ID. This could be used by other organizations to access your [facility] medical records  [id number]        Equal Access to Services     ELIZA WEBB : Hadii amy Suarez, walavelleda luqadaha, qaybta kaalmada rishi, tory lambert. So [facility] [phone].    ATENCIÓN: Si habla español, tiene a sultana disposición servicios gratuitos de asistencia lingüística. Llame al [phone].    We comply with applicable federal civil rights laws and [place] laws. We do not discriminate on the basis of race, color, national origin, age,  disability, sex, sexual orientation, or gender identity.            After Visit Summary       This is your record. Keep this with you and show to your community pharmacist(s) and doctor(s) at your next visit.

## 2017-11-20 NOTE — DISCHARGE INSTRUCTIONS
* LACERATION (All Closures)  A laceration is a cut through the skin. This will usually require stitches (sutures) or staples if it is deep. Minor cuts may be treated with a tape closure ( Steri-Strips ) or Dermabond skin glue.       HOME CARE:  PAIN MEDICINE: You may use acetaminophen (Tylenol) 650-1000 mg every 6 hours or ibuprofen (Motrin, Advil) 600 mg every 6-8 hours with food to control pain, if you are able to take these medicines. [NOTE: If you have chronic liver or kidney disease or ever had a stomach ulcer or GI bleeding, talk with your doctor before using these medicines.]  EXTREMITY, FACE or TRUNK WOUNDS:    Keep the wound clean and dry. If a bandage was applied and it becomes wet or dirty, replace it. Otherwise, leave it in place for the first 24 hours.    If stitches or staples were used, clean the wound daily. Protect the wound from sunlight and tanning lamps.    After removing the bandage, wash the area with soap and water. Use a wet cotton swab (Q tip) to loosen and remove any blood or crust that forms.    After cleaning, apply a thin layer of Polysporin or Bacitracin ointment. This will keep the wound clean and make it easier to remove the stitches or staples. Reapply a fresh bandage.    You may remove the bandage to shower as usual after the first 24 hours, but do not soak the area in water (no swimming) until the stitches or staples are removed.    If Steri-Strips were used, keep the area clean and dry. If it becomes wet, blot it dry with a towel. It is okay to take a brief shower, but avoid scrubbing the area.    If Dermabond skin adhesive was used, do not scratch, rub or pick at the adhesive film. Do not place tape directly over the film. Do not apply liquid, ointment or creams to the wound while the film is in place. Do not clean the wound with peroxide and do not apply ointments. Avoid activities that cause heavy sweating until the film has fallen off. Protect the wound from prolonged  exposure to sunlight or tanning lamps. You may shower as usual but do not soak the wound in water (no baths or swimming). The film will fall off by itself in 5-10 days.  SCALP WOUNDS: During the first two days, you may carefully rinse your hair in the shower to remove blood, glass or dirt particles. After two days, you may shower and shampoo your hair normally. Do not soak your scalp in the tub or go swimming until the stitches or staples have been removed.  MOUTH WOUNDS: Eat soft foods to reduce pain. If the cut is inside of your mouth, clean by rinsing after each meal and at bedtime with a mixture of equal parts water and Hydrogen Peroxide (do not swallow!). Or, you can use a cotton swab to directly apply Hydrogen Peroxide onto the cut.  After the wound is done healing, use sunscreen over the area whenever exposed for the next 6 minths to avoid a darker scar.     FOLLOW UP: Most skin wounds heal within ten days. Mouth and facial wounds heal within five days. However, even with proper treatment, a wound infection may sometimes occur. Therefore, you should check the wound daily for signs of infection listed below.  Stitches should be removed from the face within five days; stitches and staples should be removed from other parts of the body within 7-10 days. Unless you are told to come back to the emergency room, you may have your doctor or urgent care remove the stitches. If dissolving stitches were used in the mouth, these will fall out or dissolve without the need for removal. If tape closures ( Steri-Strips ) were used, remove them yourself if they have not fallen off after 7 days. If Dermabond skin glue was used, the film will fall off by itself in 5-10 days.      GET PROMPT MEDICAL ATTENTION  if any of the following occur:    Increasing pain in the wound    Redness, swelling or pus coming from the wound    Fever over 101 F (38.3 C) oral    If stitches or staples come apart or fall out or if Steri-Strips fall  off before seven days    If the wound edges re-open    Bleeding not controlled by direct pressure    6851-3249 [employer]. [address] [place] [address], [place] [address]. All rights reserved. This information is not intended as a substitute for professional medical care. Always follow your healthcare professional's instructions.  This information has been modified by your health care provider with permission from the publisher.    Keep dressings intact for the next 48-72 hours.  Follow-up for review with hand surgery as discussed at your convenience within the next 48-72 hours.  Percocet as needed for pain.  Keflex 500 mg 4 times daily ×10 days.  Return to the emergency department if worse or changes.

## 2017-11-20 NOTE — ED PROVIDER NOTES
History     Chief Complaint   Patient presents with     Laceration     L hand 3rd and 4th finger lac at work. bleeding controlled.     HPI  Ed Jonathan is a 47 year old male, past medical history significant for postconcussion syndrome, depression, presents to the emergency department with concerns of injury to his left 3rd and 4th fingers at work.  He was using some type of slicing blade for material that weighs approximately 300 pounds and crushed the left 3rd and 4th digit tips.  His tetanus by report is up-to-date.  Bleeding was controlled with direct pressure.  There were no other injuries.      Problem List:    Patient Active Problem List    Diagnosis Date Noted     Postconcussion syndrome 03/25/2016     Priority: Medium     Saw Dr Morillo at [facility] on 3/22/16 - MRI brain and neck, starting elavil 25, vestibular therapy, ongoing Physical Therapy, recheck 4-6 wks.  Note sent to scanning.       Shoulder joint pain, unspecified laterality 02/16/2016     Priority: Medium     Saw [facility] 1/21/16 - impingement syndrome and adhesive capsulitis both shoulders, recommended re-trying cortisone shots.         CARDIOVASCULAR SCREENING; LDL GOAL LESS THAN 160 10/31/2010     Priority: Medium     Mild major depression (H) 08/03/2007     Priority: Medium        Past Medical History:    No past medical history on file.    Past Surgical History:    Past Surgical History:   Procedure Laterality Date     ARTHROTOMY SHOULDER, ROTATOR CUFF REPAIR, COMBINED Right 5/2/2016    Procedure: COMBINED ARTHROTOMY SHOULDER, ROTATOR CUFF REPAIR;  Surgeon: Rafael Pacheco MD;  Location: [facility] OR     ARTHROTOMY SHOULDER, SUBACROMIAL DECOMPRESSION, COMBINED Left 6/13/2016    Procedure: COMBINED ARTHROTOMY SHOULDER, SUBACROMIAL DECOMPRESSION;  Surgeon: Rafael Pacheco MD;  Location: [facility] OR     DESTRUCTION OF PARAVERTEBRAL FACETCERVICAL / THORACIC SINGLE Right 8/25/2017    Procedure: DESTRUCTION OF PARAVERTEBRAL FACET  CERVICAL / THORACIC SINGLE;  Right Radiofrequency Ablation of the Cervical 3rd occipital nerve, C3. 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      HC FULL THICK GRAFT HEAD/AXIL/GEN/HND/FT <=20 CM  8/31/2003    Full thickness skin graft to the donor site of the cross finger flap      SURGICAL HISTORY OF -   9/22/2003    Division of left cross finger flap     SURGICAL HISTORY OF -   2003    left ACL repair and partial menisectomy       Family History:    Family History   Problem Relation Age of Onset     HEART DISEASE Mother      DIABETES Father      CEREBROVASCULAR DISEASE Father      Depression Sister      Obesity Sister      Obesity Sister        Social History:  Marital Status:   [2]  Social History   Substance Use Topics     Smoking status: Former Smoker     Packs/day: 1.50     Years: 17.00     Quit date: 6/20/2005     Smokeless tobacco: Never Used      Comment: around second hand smoke daily     Alcohol use Yes      Comment: occ        Medications:      oxyCODONE-acetaminophen (PERCOCET) 5-325 MG per tablet   cephALEXin (KEFLEX) 500 MG capsule   cephALEXin (KEFLEX) 500 MG capsule   oxyCODONE-acetaminophen (PERCOCET) 5-325 MG per tablet   amitriptyline (ELAVIL) 50 MG tablet   Naproxen Sodium (ALEVE PO)   acetaminophen-caffeine (EXCEDRIN TENSION HEADACHE) 500-65 MG TABS   fexofenadine (ALLEGRA) 180 MG tablet   EPINEPHrine (EPIPEN) 0.3 MG/0.3ML injection   diphenhydrAMINE-zinc acetate (BENADRYL) cream   cetirizine (ZYRTEC) 10 MG tablet   albuterol-ipratropium (COMBIVENT)  MCG/ACT inhaler   oxyCODONE IR (ROXICODONE) 5 MG tablet   sildenafil (VIAGRA) 100 MG tablet         Review of Systems   All other systems reviewed and are negative.      Physical Exam   BP: (!) 142/102  Pulse: 102  Temp: 98.2  F (36.8  C)  Resp: 18  Weight: 73.5 kg  (162 lb)  SpO2: 98 %      Physical Exam   Constitutional: He appears well-developed and well-nourished.   Musculoskeletal:        Hands:  3rd digit laceration/tip amputation accounts for approximately 2 and half centimeters.  4th digit accounts for approximate 4.5 cm.   Nursing note and vitals reviewed.      ED Course     ED Course     Procedures  9:49 AM  With verbal consent from the patient a digital ring block was placed for the left 3rd and 4th digit under sterile technique using a total of 20 cc of Marcaine without epinephrine 0.25%.  (10 cc per digit).  10:59 AM  Results for orders placed or performed during the hospital encounter of 11/20/17   Fingers XR, 2-3 views, left    Narrative    FINGER LEFT TWO OR MORE VIEWS November 20, 2017 10:27 AM    HISTORY: X-ray middle and ring finger crush injury with laceration and  macerated tissue.     COMPARISON: None.      Impression    IMPRESSION: There are comminuted displaced fractures involving the  ac of the middle and ring finger distal phalanges. Deformed and  absent soft tissues of the distal middle finger.      10:59 AM  Recheck on the patient at this time finds somewhat uncomfortable however he feels like sensation is coming back in both digits.  For this reason a repeat block was performed on both digits.  12:13 PM  Procedure note:  After ensuring adequate anesthesia standard surgical prep was observed the use of Hibiclens and saline to copiously clean the 2 traumatized digits.  The 3rd digit was severely macerated and traumatized and received a total of 4 simple interrupted sutures in various suture planes to try to close over the exposed bone of the digit tip with success.  The 4th digit received a total of 12 simple interrupted sutures for this partial tip near amputation to close approximate 4 cm worth of laceration total.  Well tolerated.  Dressing and tube gauze applied.  The patient's tetanus status was updated and he received initial dose of  antibiotic in the emergency department.  While I was performing the repair the patient's wife who is a nurse was on the phone with hand surgery requesting that he be seen today in clinic; they will be seeing Dr. Lyle this afternoon in clinic.             Critical Care time:  none               Labs Ordered and Resulted from Time of ED Arrival Up to the Time of Departure from the ED - No data to display    Assessments & Plan (with Medical Decision Making)   47-year-old male past medical history reviewed as above, presentation to the emergency department with concerns of traumatic crushing and cutting injury to his left 3rd and 4th digits as described in the HPI.  Nondominant hand.  Physical exam documents the present of significant injury to the tips of both the 3rd and 4th digits.  After ensuring adequate anesthesia with digital blocks as documented After appropriate wound preparation primary closure was performed as described in the procedure note, the patient received appropriate dressing for his wounds, initial dose of antibiotic in the emergency department and a prescription for home, also prescription for Percocet for pain when his digital block wears off.  The patient's wife requested that he be seen by hand surgery and actually contacted them and make an appointment while he was performing his repair in the emergency department.  Follow-up will be at their choosing with Dr. Lyle.    Disclaimer: This note consists of symbols derived from keyboarding, dictation and/or voice recognition software. As a result, there may be errors in the script that have gone undetected. Please consider this when interpreting information found in this chart.      I have reviewed the nursing notes.    I have reviewed the findings, diagnosis, plan and need for follow up with the patient.          Discharge Medication List as of 11/20/2017 12:27 PM      START taking these medications    Details   !! oxyCODONE-acetaminophen  (PERCOCET) 5-325 MG per tablet Take 1-2 tablets by mouth every 6 hours as needed for moderate to severe pain, Disp-20 tablet, R-0, Local Print      !! cephALEXin (KEFLEX) 500 MG capsule Take 1 capsule (500 mg) by mouth 4 times daily for 10 days, Disp-40 capsule, R-0, Local Print      !! cephALEXin (KEFLEX) 500 MG capsule Take 1 capsule (500 mg) by mouth 4 times daily for 10 days, Disp-40 capsule, R-0, Local Print      !! oxyCODONE-acetaminophen (PERCOCET) 5-325 MG per tablet Take 1-2 tablets by mouth every 6 hours as needed for moderate to severe pain, Disp-20 tablet, R-0, Local Print       !! - Potential duplicate medications found. Please discuss with provider.          Final diagnoses:   Laceration of left ring finger without foreign body with damage to nail, initial encounter - Associated phalanx fracture open.   Laceration of left middle finger without foreign body with damage to nail, initial encounter - Associated phalanx fracture open       11/20/2017   [facility] EMERGENCY DEPARTMENT     Franklin Gonzalez MD  11/21/17 0646       Franklin Gonzalez MD  11/23/17 [id number]

## 2017-11-20 NOTE — ED AVS SNAPSHOT
[facility]    [address] [address]    Phone:  [phone]    Fax:  [phone]                                       Dann Castillo   MRN: [id number]    Department:  [facility]   Date of Visit:  11/20/2017           After Visit Summary Signature Page     I have received my discharge instructions, and my questions have been answered. I have discussed any challenges I see with this plan with the nurse or doctor.    ..........................................................................................................................................  Patient/Patient Representative Signature      ..........................................................................................................................................  Patient Representative Print Name and Relationship to Patient    ..................................................               ................................................  Date                                            Time    ..........................................................................................................................................  Reviewed by Signature/Title    ...................................................              ..............................................  Date                                                            Time

## 2017-11-21 VITALS
DIASTOLIC BLOOD PRESSURE: 90 MMHG | SYSTOLIC BLOOD PRESSURE: 117 MMHG | TEMPERATURE: 97.8 F | RESPIRATION RATE: 18 BRPM | OXYGEN SATURATION: 92 % | HEART RATE: 80 BPM

## 2017-11-21 PROCEDURE — 25000132 ZZH RX MED GY IP 250 OP 250 PS 637: Performed by: EMERGENCY MEDICINE

## 2017-11-21 RX ORDER — OXYCODONE HYDROCHLORIDE 5 MG/1
5 TABLET ORAL EVERY 4 HOURS PRN
Qty: 10 TABLET | Refills: 0 | Status: ON HOLD | OUTPATIENT
Start: 2017-11-21 | End: 2018-02-02

## 2017-11-21 RX ORDER — OXYCODONE HYDROCHLORIDE 5 MG/1
5 TABLET ORAL EVERY 4 HOURS PRN
Status: DISCONTINUED | OUTPATIENT
Start: 2017-11-21 | End: 2017-11-21 | Stop reason: HOSPADM

## 2017-11-21 RX ADMIN — OXYCODONE HYDROCHLORIDE 5 MG: 5 TABLET ORAL at 01:38

## 2017-11-21 NOTE — DISCHARGE INSTRUCTIONS
Use topical antibiotic ointment and petroleum gauze as a ace later and then cover them with gauze and the splints for comfort.  Return to the emergency department for severe pain, spreading rash, fever, or other concerns.  Otherwise follow up in orthopedic surgery clinic.    Use ibuprofen 600 mg and acetaminophen 1 g for your symptoms.  Use oxycodone as needed for pain that is not controlled by the prior two medications.    Oxycodone is an addictive opioid medication, please only take it when the pain is more than can be controlled by acetaminophen or ibuprofen alone. It will also make you lightheaded, nauseated, and constipated.  Do not drive, operate heavy machinery, or take care of young children while taking this medication.     Repeated studies have shown that the longer you use opioid pain medications, the longer it is until you return to normal function. It is our recommendation that you taper off opioids as quickly as possible with the goal of returning to normal function or near normal function. Long term use of opioids quickly results in growing tolerance to the medication (less of the benefits) and increased dependence (more of the bad side effects).     Pain is very difficult to treat and we can very rarely take away pain completely. If you are having difficulty with pain over several weeks after an injury, you may need to start different medications and therapies (such as physical therapy, graded exercise, massage, and acupuncture). Please talk about this with your regular doctor.     If you are interested in seeking free, confidential treatment referral and information service for individuals and families facing mental health and/or substance use disorders please call [phone])

## 2017-11-21 NOTE — ED NOTES
Patient up and walking to bathroom independently with no distress patient being discharged with prescription of PRN oxycodone IR 5 mg 10 tabs given at time of discharge for take home.

## 2017-11-21 NOTE — ED NOTES
Patient states was here earlier tonight for a injury to left hand. Was discharged post sutures and pain medications, returns stating that pain medication is not touching the pain. Wife states he took Percocet and 600 mg Ibuprofen last about an hour ago with no relief. Patient states pain is 10/10. Wound on hand dressing intact.

## 2017-11-21 NOTE — ED PROVIDER NOTES
History     Chief Complaint   Patient presents with     Hand Injury     was seen here earlier today but unable to get pain under control     HPI  Ed Jonathan is a 47 year old male who presents for finger pain.  The patient was seen earlier today after he had a crush injury to his left 3rd and 4th digits.  The wounds were repaired and he was discharged on pain medicine and antibiotics per review of the chart.  The patient returns tonight stating that his pain is uncontrolled, severe, burning and aching, tips of the 3rd and 4th digits.  No other injuries.  No chest pain or difficulty breathing.  No nausea or vomiting.    Problem List:    Patient Active Problem List    Diagnosis Date Noted     Postconcussion syndrome 03/25/2016     Priority: Medium     Saw Dr Morillo at [facility] on 3/22/16 - MRI brain and neck, starting elavil 25, vestibular therapy, ongoing Physical Therapy, recheck 4-6 wks.  Note sent to scanning.       Shoulder joint pain, unspecified laterality 02/16/2016     Priority: Medium     Saw [facility] 1/21/16 - impingement syndrome and adhesive capsulitis both shoulders, recommended re-trying cortisone shots.         CARDIOVASCULAR SCREENING; LDL GOAL LESS THAN 160 10/31/2010     Priority: Medium     Mild major depression (H) 08/03/2007     Priority: Medium        Past Medical History:    No past medical history on file.    Past Surgical History:    Past Surgical History:   Procedure Laterality Date     ARTHROTOMY SHOULDER, ROTATOR CUFF REPAIR, COMBINED Right 5/2/2016    Procedure: COMBINED ARTHROTOMY SHOULDER, ROTATOR CUFF REPAIR;  Surgeon: Rafael Pacheco MD;  Location: [facility] OR     ARTHROTOMY SHOULDER, SUBACROMIAL DECOMPRESSION, COMBINED Left 6/13/2016    Procedure: COMBINED ARTHROTOMY SHOULDER, SUBACROMIAL DECOMPRESSION;  Surgeon: Rafael Pacheco MD;  Location: [facility] OR     DESTRUCTION OF PARAVERTEBRAL FACETCERVICAL / THORACIC SINGLE Right 8/25/2017    Procedure: DESTRUCTION OF  PARAVERTEBRAL FACET CERVICAL / THORACIC SINGLE;  Right Radiofrequency Ablation of the Cervical 3rd occipital nerve, C3. 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      HC FULL THICK GRAFT HEAD/AXIL/GEN/HND/FT <=20 CM  8/31/2003    Full thickness skin graft to the donor site of the cross finger flap      SURGICAL HISTORY OF -   9/22/2003    Division of left cross finger flap     SURGICAL HISTORY OF -   2003    left ACL repair and partial menisectomy       Family History:    Family History   Problem Relation Age of Onset     HEART DISEASE Mother      DIABETES Father      CEREBROVASCULAR DISEASE Father      Depression Sister      Obesity Sister      Obesity Sister        Social History:  Marital Status:   [2]  Social History   Substance Use Topics     Smoking status: Former Smoker     Packs/day: 1.50     Years: 17.00     Quit date: 6/20/2005     Smokeless tobacco: Never Used      Comment: around second hand smoke daily     Alcohol use Yes      Comment: occ        Medications:      oxyCODONE IR (ROXICODONE) 5 MG tablet   oxyCODONE-acetaminophen (PERCOCET) 5-325 MG per tablet   cephALEXin (KEFLEX) 500 MG capsule   cephALEXin (KEFLEX) 500 MG capsule   oxyCODONE-acetaminophen (PERCOCET) 5-325 MG per tablet   amitriptyline (ELAVIL) 50 MG tablet   Naproxen Sodium (ALEVE PO)   sildenafil (VIAGRA) 100 MG tablet   acetaminophen-caffeine (EXCEDRIN TENSION HEADACHE) 500-65 MG TABS   fexofenadine (ALLEGRA) 180 MG tablet   EPINEPHrine (EPIPEN) 0.3 MG/0.3ML injection   diphenhydrAMINE-zinc acetate (BENADRYL) cream   cetirizine (ZYRTEC) 10 MG tablet   albuterol-ipratropium (COMBIVENT)  MCG/ACT inhaler         Review of Systems  A 4 point review of systems was performed. All pertinent positives and negatives were listed in the HPI and rest of ROS were  otherwise negative.    Physical Exam   BP: 145/88  Pulse: 80  Temp: 97.8  F (36.6  C)  Resp: 18  SpO2: 95 %      Physical Exam   Constitutional: He is oriented to person, place, and time. He appears well-developed and well-nourished. No distress.   HENT:   Head: Normocephalic and atraumatic.   Eyes: No scleral icterus.   Neck: Normal range of motion. Neck supple.   Neurological: He is alert and oriented to person, place, and time.   Skin: Skin is warm and dry. No rash noted. He is not diaphoretic. No erythema. No pallor.   3rd and 4th digits of left hand shows significant soft tissue injury and skin avulsion, however when the wounds are well appearing and are intact from the closure earlier in the day without discharge or spreading rash.       ED Course     ED Course     Procedures          Peripheral nerve block  Location: Left 3rd digit  Indication: pain  Date: 11/20/17  The patient gave verbal consent for the procedure. Risks and benefits were discussed.  Pre-procedure exam: Motor and sensory exam normal prior to injection. Normal perfusion.  Procedure: A 21 gauge needle was inserted under normal sterile procedure. Approximately 2 mL of 0.5% bupivacaine with epinephrine was injected. The patient tolerated the procedure well without immediate complication. Pain was improved.    Peripheral nerve block  Location: Left 4th digit  Indication: pain  Date: 11/20/17  The patient gave verbal consent for the procedure. Risks and benefits were discussed.  Pre-procedure exam: Motor and sensory exam normal prior to injection. Normal perfusion.  Procedure: A 21 gauge needle was inserted under normal sterile procedure. Approximately 2 mL of 0.5% bupivacaine with epinephrine was injected. The patient tolerated the procedure well without immediate complication. Pain was improved.       Critical Care time:  none               Labs Ordered and Resulted from Time of ED Arrival Up to the Time of Departure from the ED - No data to  display    Assessments & Plan (with Medical Decision Making)   47-year-old male presents for left finger pain.  He is given digital blocks as above with resolution of his pain and discharged with a short course of oxycodone and instructions to follow up in primary care, as well as follow-up in orthopedic surgery clinic.  The patient is in agreement with this plan.    I have reviewed the nursing notes.    I have reviewed the findings, diagnosis, plan and need for follow up with the patient.       Discharge Medication List as of 11/21/2017  1:31 AM      START taking these medications    Details   oxyCODONE IR (ROXICODONE) 5 MG tablet Take 1 tablet (5 mg) by mouth every 4 hours as needed for pain, Disp-10 tablet, R-0, Local Print             Final diagnoses:   Crushing injury of finger of left hand       11/20/2017   [facility] EMERGENCY DEPARTMENT     Farooq Benedict MD  11/21/17 0707

## 2017-11-24 ENCOUNTER — OFFICE VISIT (OUTPATIENT)
Dept: FAMILY MEDICINE | Facility: CLINIC | Age: 47
End: 2017-11-24
Payer: OTHER MISCELLANEOUS

## 2017-11-24 VITALS
BODY MASS INDEX: 25.65 KG/M2 | HEART RATE: 96 BPM | WEIGHT: 163.8 LBS | TEMPERATURE: 97.6 F | DIASTOLIC BLOOD PRESSURE: 78 MMHG | SYSTOLIC BLOOD PRESSURE: 110 MMHG

## 2017-11-24 DIAGNOSIS — S62.633D OPEN DISPLACED FRACTURE OF DISTAL PHALANX OF LEFT MIDDLE FINGER WITH ROUTINE HEALING, SUBSEQUENT ENCOUNTER: Primary | ICD-10-CM

## 2017-11-24 PROCEDURE — 99214 OFFICE O/P EST MOD 30 MIN: CPT | Performed by: PHYSICIAN ASSISTANT

## 2017-11-24 RX ORDER — OXYCODONE AND ACETAMINOPHEN 5; 325 MG/1; MG/1
1 TABLET ORAL EVERY 4 HOURS PRN
Qty: 45 TABLET | Refills: 0 | Status: SHIPPED | OUTPATIENT
Start: 2017-11-24 | End: 2018-02-01

## 2017-11-24 ASSESSMENT — ENCOUNTER SYMPTOMS
DYSURIA: 0
DOUBLE VISION: 0
PHOTOPHOBIA: 0
NERVOUS/ANXIOUS: 0
SENSORY CHANGE: 0
MYALGIAS: 0
DIARRHEA: 0
EYE REDNESS: 0
NECK PAIN: 0
CONSTIPATION: 0
PALPITATIONS: 0
SHORTNESS OF BREATH: 0
DEPRESSION: 0
FREQUENCY: 0
BACK PAIN: 0
FOCAL WEAKNESS: 0
HEMOPTYSIS: 0
EYE PAIN: 0
SORE THROAT: 0
LOSS OF CONSCIOUSNESS: 0
ORTHOPNEA: 0
FEVER: 0
DIZZINESS: 0
HEARTBURN: 0
NEUROLOGICAL NEGATIVE: 1
EYE DISCHARGE: 0
WEAKNESS: 0
BLURRED VISION: 0
TINGLING: 0
HEADACHES: 0
DIAPHORESIS: 0
NAUSEA: 0
ABDOMINAL PAIN: 0
WEIGHT LOSS: 0
HALLUCINATIONS: 0
SPUTUM PRODUCTION: 0
VOMITING: 0
WHEEZING: 0
COUGH: 0
BLOOD IN STOOL: 0
SEIZURES: 0
INSOMNIA: 0

## 2017-11-24 ASSESSMENT — PAIN SCALES - GENERAL: PAINLEVEL: SEVERE PAIN (6)

## 2017-11-24 ASSESSMENT — LIFESTYLE VARIABLES: SUBSTANCE_ABUSE: 0

## 2017-11-24 NOTE — NURSING NOTE
"Chief Complaint   Patient presents with     ER F/U       Initial /78 (BP Location: Right arm, Patient Position: Chair, Cuff Size: Adult Large)  Pulse 96  Temp 97.6  F (36.4  C) (Tympanic)  Wt 163 lb 12.8 oz (74.3 kg)  BMI 25.65 kg/m2 Estimated body mass index is 25.65 kg/(m^2) as calculated from the following:    Height as of 8/25/17: 5' 7\" (1.702 m).    Weight as of this encounter: 163 lb 12.8 oz (74.3 kg).  Medication Reconciliation: complete    Health Maintenance that is potentially due pending provider review:  NONE    n/a    Is there anyone who you would like to be able to receive your results? No  If yes have patient fill out SILVIO    Patria ROBBINS CMA    "

## 2017-11-24 NOTE — MR AVS SNAPSHOT
After Visit Summary   11/24/2017    Ed Jonathan    MRN: [id number]           Patient Information     Date Of Birth          1970        Visit Information        Provider Department      11/24/2017 9:00 AM Bernardo Lazcano PA-C [facility]        Today's Diagnoses     Open displaced fracture of distal phalanx of left middle finger with routine healing, subsequent encounter    -  1       Follow-ups after your visit        Follow-up notes from your care team     Return if symptoms worsen or fail to improve.      Who to contact     If you have questions or need follow up information about today's clinic visit or your schedule please contact [facility] directly at [phone].  Normal or non-critical lab and imaging results will be communicated to you by MyChart, letter or phone within 4 business days after the clinic has received the results. If you do not hear from us within 7 days, please contact the clinic through [employer] or phone. If you have a critical or abnormal lab result, we will notify you by phone as soon as possible.  Submit refill requests through [employer] or call your pharmacy and they will forward the refill request to us. Please allow 3 business days for your refill to be completed.          Additional Information About Your Visit        MyChart Information     [employer] gives you secure access to your electronic health record. If you see a primary care provider, you can also send messages to your care team and make appointments. If you have questions, please call your primary care clinic.  If you do not have a primary care provider, please call [phone] and they will assist you.        Care EveryWhere ID     This is your Care EveryWhere ID. This could be used by other organizations to access your [place] medical records  [id number]        Your Vitals Were     Pulse Temperature BMI (Body Mass Index)             96 97.6  F (36.4  C) (Tympanic)  25.65 kg/m2          Blood Pressure from Last 3 Encounters:   11/24/17 110/78   11/21/17 117/90   11/20/17 127/90    Weight from Last 3 Encounters:   11/24/17 163 lb 12.8 oz (74.3 kg)   11/20/17 162 lb (73.5 kg)   08/25/17 159 lb (72.1 kg)              Today, you had the following     No orders found for display         Today's Medication Changes          These changes are accurate as of: 11/24/17 10:53 AM.  If you have any questions, ask your nurse or doctor.               These medicines have changed or have updated prescriptions.        Dose/Directions    * oxyCODONE-acetaminophen 5-325 MG per tablet   Commonly known as:  PERCOCET   This may have changed:  Another medication with the same name was changed. Make sure you understand how and when to take each.        Dose:  1-2 tablet   Take 1-2 tablets by mouth every 6 hours as needed for moderate to severe pain   Quantity:  20 tablet   Refills:  0       * oxyCODONE-acetaminophen 5-325 MG per tablet   Commonly known as:  PERCOCET   This may have changed:    - how much to take  - when to take this   Used for:  Open displaced fracture of distal phalanx of left middle finger with routine healing, subsequent encounter   Changed by:  Bernardo Lazcano PA-C        Dose:  1 tablet   Take 1 tablet by mouth every 4 hours as needed for moderate to severe pain   Quantity:  45 tablet   Refills:  0       * Notice:  This list has 2 medication(s) that are the same as other medications prescribed for you. Read the directions carefully, and ask your doctor or other care provider to review them with you.         Where to get your medicines      Some of these will need a paper prescription and others can be bought over the counter.  Ask your nurse if you have questions.     Bring a paper prescription for each of these medications     oxyCODONE-acetaminophen 5-325 MG per tablet                Primary Care Provider Office Phone # Fax #    Abigail Gregory PA-C [phone]  [phone]       [address] [address]        Equal Access to Services     ELIZA JON : Hadii amy enrique [place], walavelleda luyasmanydoreneha, yakovta kaayaka dee deebrionna, tory domiin hayaagenia funezmilan radford laBrentonjess lambert. So [facility] [phone].    ATENCIÓN: Si habla español, tiene a sultana disposición servicios gratuitos de asistencia lingüística. Shahbaz al [phone].    We comply with applicable federal civil rights laws and [place] laws. We do not discriminate on the basis of race, color, national origin, age, disability, sex, sexual orientation, or gender identity.            Thank you!     Thank you for choosing [facility]  for your care. Our goal is always to provide you with excellent care. Hearing back from our patients is one way we can continue to improve our services. Please take a few minutes to complete the written survey that you may receive in the mail after your visit with us. Thank you!             Your Updated Medication List - Protect others around you: Learn how to safely use, store and throw away your medicines at www.disposemymeds.org.          This list is accurate as of: 11/24/17 10:53 AM.  Always use your most recent med list.                   Brand Name Dispense Instructions for use Diagnosis    acetaminophen-caffeine 500-65 MG Tabs    EXCEDRIN TENSION HEADACHE     Take 3 tablets by mouth every 6 hours as needed for mild pain    Nail fungal infection       albuterol-ipratropium  MCG/ACT Inhaler    COMBIVENT    1 Inhaler    Inhale 2 puffs into the lungs 4 times daily. Needs appointment    Cough       ALEVE PO      Take 220 mg by mouth 2 times daily as needed for moderate pain        amitriptyline 50 MG tablet    ELAVIL    30 tablet    Take 1 tablet (50 mg) by mouth At Bedtime    Left-sided low back pain with left-sided sciatica, unspecified chronicity       BENADRYL cream   Generic drug:  diphenhydrAMINE-zinc acetate      Apply 50 mg topically as needed.        *  cephALEXin 500 MG capsule    KEFLEX    40 capsule    Take 1 capsule (500 mg) by mouth 4 times daily for 10 days        * cephALEXin 500 MG capsule    KEFLEX    40 capsule    Take 1 capsule (500 mg) by mouth 4 times daily for 10 days        cetirizine 10 MG tablet    zyrTEC    60 tablet    Take 1 tablet by mouth 2 times daily.    Hives       EPINEPHrine 0.3 MG/0.3ML injection    EPIPEN     Inject 0.3 mg into the muscle once as needed.        fexofenadine 180 MG tablet    ALLEGRA    30 tablet    Take 1 tablet by mouth daily. Take upon awakening.    Hives       oxyCODONE IR 5 MG tablet    ROXICODONE    10 tablet    Take 1 tablet (5 mg) by mouth every 4 hours as needed for pain        * oxyCODONE-acetaminophen 5-325 MG per tablet    PERCOCET    20 tablet    Take 1-2 tablets by mouth every 6 hours as needed for moderate to severe pain        * oxyCODONE-acetaminophen 5-325 MG per tablet    PERCOCET    45 tablet    Take 1 tablet by mouth every 4 hours as needed for moderate to severe pain    Open displaced fracture of distal phalanx of left middle finger with routine healing, subsequent encounter       sildenafil 100 MG tablet    VIAGRA    6 tablet    Take 0.5-1 tablets ( mg) by mouth daily as needed for erectile dysfunction Take 30 min to 4 hours before intercourse.        * Notice:  This list has 4 medication(s) that are the same as other medications prescribed for you. Read the directions carefully, and ask your doctor or other care provider to review them with you.

## 2017-11-24 NOTE — PROGRESS NOTES
HPI    SUBJECTIVE:   Ed Jonathan is a 47 year old male who presents to clinic today for follow-up after open fracture/partial amputation left third and fourth fingers. He was seen in the emergency room on 2 occasions on the 20th and then has seen the hand surgeon as well. They have been doing daily dressing changes. He continues to have pain associated with this.          ED/UC Followup:    Facility:  [facility]   Date of visit: 11/20/2017  Reason for visit: Crushing Injury and Laceration to Left 3rd and 4th  Fingers   Current Status: Patient states that the pain is Improved from when he was in the ER      Problem list and histories reviewed & adjusted, as indicated.  Additional history: as documented    Patient Active Problem List   Diagnosis     Mild major depression (H)     CARDIOVASCULAR SCREENING; LDL GOAL LESS THAN 160     Shoulder joint pain, unspecified laterality     Postconcussion syndrome     Past Surgical History:   Procedure Laterality Date     ARTHROTOMY SHOULDER, ROTATOR CUFF REPAIR, COMBINED Right 5/2/2016    Procedure: COMBINED ARTHROTOMY SHOULDER, ROTATOR CUFF REPAIR;  Surgeon: Rafael Pacheco MD;  Location: [facility] OR     ARTHROTOMY SHOULDER, SUBACROMIAL DECOMPRESSION, COMBINED Left 6/13/2016    Procedure: COMBINED ARTHROTOMY SHOULDER, SUBACROMIAL DECOMPRESSION;  Surgeon: Rafael Pacheco MD;  Location: [facility] OR     DESTRUCTION OF PARAVERTEBRAL FACETCERVICAL / THORACIC SINGLE Right 8/25/2017    Procedure: DESTRUCTION OF PARAVERTEBRAL FACET CERVICAL / THORACIC SINGLE;  Right Radiofrequency Ablation of the Cervical 3rd occipital nerve, C3. 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      HC FULL THICK GRAFT HEAD/AXIL/GEN/HND/FT <=20 CM  8/31/2003    Full thickness skin graft to the donor site of the cross finger flap       SURGICAL HISTORY OF -   9/22/2003    Division of left cross finger flap     SURGICAL HISTORY OF -   2003    left ACL repair and partial menisectomy       Social History   Substance Use Topics     Smoking status: Former Smoker     Packs/day: 1.50     Years: 17.00     Quit date: 6/20/2005     Smokeless tobacco: Never Used      Comment: around second hand smoke daily     Alcohol use Yes      Comment: occ     Family History   Problem Relation Age of Onset     HEART DISEASE Mother      DIABETES Father      CEREBROVASCULAR DISEASE Father      Depression Sister      Obesity Sister      Obesity Sister          Current Outpatient Prescriptions   Medication Sig Dispense Refill     oxyCODONE-acetaminophen (PERCOCET) 5-325 MG per tablet Take 1 tablet by mouth every 4 hours as needed for moderate to severe pain 45 tablet 0     oxyCODONE IR (ROXICODONE) 5 MG tablet Take 1 tablet (5 mg) by mouth every 4 hours as needed for pain 10 tablet 0     cephALEXin (KEFLEX) 500 MG capsule Take 1 capsule (500 mg) by mouth 4 times daily for 10 days 40 capsule 0     Naproxen Sodium (ALEVE PO) Take 220 mg by mouth 2 times daily as needed for moderate pain       sildenafil (VIAGRA) 100 MG tablet Take 0.5-1 tablets ( mg) by mouth daily as needed for erectile dysfunction Take 30 min to 4 hours before intercourse. 6 tablet 11     acetaminophen-caffeine (EXCEDRIN TENSION HEADACHE) 500-65 MG TABS Take 3 tablets by mouth every 6 hours as needed for mild pain       fexofenadine (ALLEGRA) 180 MG tablet Take 1 tablet by mouth daily. Take upon awakening. 30 tablet 11     EPINEPHrine (EPIPEN) 0.3 MG/0.3ML injection Inject 0.3 mg into the muscle once as needed.       diphenhydrAMINE-zinc acetate (BENADRYL) cream Apply 50 mg topically as needed.       cetirizine (ZYRTEC) 10 MG tablet Take 1 tablet by mouth 2 times daily. 60 tablet 11     albuterol-ipratropium (COMBIVENT)  MCG/ACT inhaler Inhale 2 puffs into the lungs 4 times daily. Needs  appointment 1 Inhaler 11     cephALEXin (KEFLEX) 500 MG capsule Take 1 capsule (500 mg) by mouth 4 times daily for 10 days (Patient not taking: Reported on 11/24/2017) 40 capsule 0     oxyCODONE-acetaminophen (PERCOCET) 5-325 MG per tablet Take 1-2 tablets by mouth every 6 hours as needed for moderate to severe pain (Patient not taking: Reported on 11/24/2017) 20 tablet 0     amitriptyline (ELAVIL) 50 MG tablet Take 1 tablet (50 mg) by mouth At Bedtime (Patient not taking: Reported on 11/24/2017) 30 tablet 11     Allergies   Allergen Reactions     Nka [No Known Allergies]      Peanuts [Nuts] Hives     Labs reviewed in EPIC      Reviewed and updated as needed this visit by clinical staff       Reviewed and updated as needed this visit by Provider       Review of Systems   Constitutional: Negative for diaphoresis, fever, malaise/fatigue and weight loss.   HENT: Negative for congestion, ear discharge, ear pain, hearing loss, nosebleeds and sore throat.    Eyes: Negative for blurred vision, double vision, photophobia, pain, discharge and redness.   Respiratory: Negative for cough, hemoptysis, sputum production, shortness of breath and wheezing.    Cardiovascular: Negative for chest pain, palpitations, orthopnea and leg swelling.   Gastrointestinal: Negative for abdominal pain, blood in stool, constipation, diarrhea, heartburn, melena, nausea and vomiting.   Genitourinary: Negative.  Negative for dysuria, frequency and urgency.   Musculoskeletal: Negative for back pain, joint pain, myalgias and neck pain.        Fracture/amputation/laceration left third and fourth fingers   Skin: Negative for itching and rash.   Neurological: Negative.  Negative for dizziness, tingling, sensory change, focal weakness, seizures, loss of consciousness, weakness and headaches.   Endo/Heme/Allergies: Negative.    Psychiatric/Behavioral: Negative for depression, hallucinations, substance abuse and suicidal ideas. The patient is not  nervous/anxious and does not have insomnia.          Physical Exam   Constitutional: He is oriented to person, place, and time and well-developed, well-nourished, and in no distress.   HENT:   Head: Normocephalic and atraumatic.   Right Ear: External ear normal.   Left Ear: External ear normal.   Nose: Nose normal.   Mouth/Throat: Oropharynx is clear and moist.   Eyes: Conjunctivae and EOM are normal. Pupils are equal, round, and reactive to light. Right eye exhibits no discharge. Left eye exhibits no discharge. No scleral icterus.   Neck: Normal range of motion. Neck supple. No thyromegaly present.   Cardiovascular: Normal rate, regular rhythm, normal heart sounds and intact distal pulses.  Exam reveals no gallop and no friction rub.    No murmur heard.  Pulmonary/Chest: Effort normal and breath sounds normal. No respiratory distress. He has no wheezes. He has no rales. He exhibits no tenderness.   Abdominal: Soft. Bowel sounds are normal. He exhibits no distension and no mass. There is no tenderness. There is no rebound and no guarding.   Musculoskeletal: Normal range of motion. He exhibits no edema or tenderness.   Lymphadenopathy:     He has no cervical adenopathy.   Neurological: He is alert and oriented to person, place, and time. He has normal reflexes. No cranial nerve deficit. He exhibits normal muscle tone. Gait normal. Coordination normal.   Skin: Skin is warm and dry. No rash noted. No erythema.   Amputation of the distal aspect of the left fourth finger with nail missing as well.  The skin that was repaired on the distal third finger does not appear to have blood supply and is essentially functioning as a biological dressing at this time.   Psychiatric: Mood, memory, affect and judgment normal.         (Z18.289B) Open displaced fracture of distal phalanx of left middle finger with routine healing, subsequent encounter  (primary encounter diagnosis)  Comment:   Plan: oxyCODONE-acetaminophen (PERCOCET)  5-325 MG per        tablet               The dressings were removed and some soaking performed to remove some debris from the fingers. The dressings were applied using bacitracin, Adaptic and Kerlix. They will continue to do daily dressing changes and follow up with hand surgeon next Thursday. A refill of pain medication was given

## 2017-11-28 ENCOUNTER — TRANSFERRED RECORDS (OUTPATIENT)
Dept: HEALTH INFORMATION MANAGEMENT | Facility: CLINIC | Age: 47
End: 2017-11-28

## 2017-11-30 ENCOUNTER — TRANSFERRED RECORDS (OUTPATIENT)
Dept: HEALTH INFORMATION MANAGEMENT | Facility: CLINIC | Age: 47
End: 2017-11-30

## 2017-12-21 ENCOUNTER — TRANSFERRED RECORDS (OUTPATIENT)
Dept: HEALTH INFORMATION MANAGEMENT | Facility: CLINIC | Age: 47
End: 2017-12-21

## 2018-01-11 ENCOUNTER — TRANSFERRED RECORDS (OUTPATIENT)
Dept: HEALTH INFORMATION MANAGEMENT | Facility: CLINIC | Age: 48
End: 2018-01-11

## 2018-01-31 ENCOUNTER — TRANSFERRED RECORDS (OUTPATIENT)
Dept: HEALTH INFORMATION MANAGEMENT | Facility: CLINIC | Age: 48
End: 2018-01-31

## 2018-02-01 ENCOUNTER — ANESTHESIA EVENT (OUTPATIENT)
Dept: SURGERY | Facility: CLINIC | Age: 48
End: 2018-02-01
Payer: OTHER MISCELLANEOUS

## 2018-02-01 NOTE — ANESTHESIA PREPROCEDURE EVALUATION
Anesthesia Evaluation     . Pt has had prior anesthetic. Type: MAC, General and Regional    No history of anesthetic complications          ROS/MED HX    ENT/Pulmonary:       Neurologic:  - neg neurologic ROS     Cardiovascular:  - neg cardiovascular ROS   (+) ----. : . . . :. . No previous cardiac testing       METS/Exercise Tolerance:  4 - Raking leaves, gardening   Hematologic:         Musculoskeletal:  - neg musculoskeletal ROS       GI/Hepatic:  - neg GI/hepatic ROS       Renal/Genitourinary:  - ROS Renal section negative       Endo:  - neg endo ROS       Psychiatric:     (+) psychiatric history depression      Infectious Disease:  - neg infectious disease ROS       Malignancy:      - no malignancy   Other:    - neg other ROS                 Physical Exam  Normal systems: cardiovascular, pulmonary and dental    Airway   Mallampati: II  TM distance: >3 FB  Neck ROM: full    Dental     Cardiovascular   Rhythm and rate: regular      Pulmonary                     Anesthesia Plan      History & Physical Review  History and physical reviewed and following examination; no interval change.    ASA Status:  2 .    NPO Status:  > 6 hours    Plan for MAC with Propofol induction. Maintenance will be TIVA.  Reason for MAC:  Deep or markedly invasive procedure (G8)         Postoperative Care  Postoperative pain management:  IV analgesics.      Consents  Anesthetic plan, risks, benefits and alternatives discussed with:  Patient..                          .

## 2018-02-02 ENCOUNTER — ANESTHESIA (OUTPATIENT)
Dept: SURGERY | Facility: CLINIC | Age: 48
End: 2018-02-02
Payer: OTHER MISCELLANEOUS

## 2018-02-02 ENCOUNTER — APPOINTMENT (OUTPATIENT)
Dept: GENERAL RADIOLOGY | Facility: CLINIC | Age: 48
End: 2018-02-02
Attending: ORTHOPAEDIC SURGERY
Payer: OTHER MISCELLANEOUS

## 2018-02-02 ENCOUNTER — HOSPITAL ENCOUNTER (OUTPATIENT)
Facility: CLINIC | Age: 48
Discharge: HOME | End: 2018-02-02
Attending: ORTHOPAEDIC SURGERY | Admitting: ORTHOPAEDIC SURGERY
Payer: OTHER MISCELLANEOUS

## 2018-02-02 VITALS
BODY MASS INDEX: 25.43 KG/M2 | SYSTOLIC BLOOD PRESSURE: 109 MMHG | HEIGHT: 67 IN | HEART RATE: 75 BPM | TEMPERATURE: 98.1 F | RESPIRATION RATE: 16 BRPM | DIASTOLIC BLOOD PRESSURE: 77 MMHG | WEIGHT: 162 LBS | OXYGEN SATURATION: 98 %

## 2018-02-02 PROCEDURE — 36000052 ZZH SURGERY LEVEL 2 EA 15 ADDTL MIN: Performed by: ORTHOPAEDIC SURGERY

## 2018-02-02 PROCEDURE — 25000125 ZZHC RX 250: Performed by: ORTHOPAEDIC SURGERY

## 2018-02-02 PROCEDURE — 27210794 ZZH OR GENERAL SUPPLY STERILE: Performed by: ORTHOPAEDIC SURGERY

## 2018-02-02 PROCEDURE — 71000027 ZZH RECOVERY PHASE 2 EACH 15 MINS: Performed by: ORTHOPAEDIC SURGERY

## 2018-02-02 PROCEDURE — 25000128 H RX IP 250 OP 636: Performed by: NURSE ANESTHETIST, CERTIFIED REGISTERED

## 2018-02-02 PROCEDURE — 37000008 ZZH ANESTHESIA TECHNICAL FEE, 1ST 30 MIN: Performed by: ORTHOPAEDIC SURGERY

## 2018-02-02 PROCEDURE — 25000128 H RX IP 250 OP 636: Performed by: ORTHOPAEDIC SURGERY

## 2018-02-02 PROCEDURE — 36000054 ZZH SURGERY LEVEL 2 W FLUORO 1ST 30 MIN: Performed by: ORTHOPAEDIC SURGERY

## 2018-02-02 PROCEDURE — 37000009 ZZH ANESTHESIA TECHNICAL FEE, EACH ADDTL 15 MIN: Performed by: ORTHOPAEDIC SURGERY

## 2018-02-02 PROCEDURE — 25000132 ZZH RX MED GY IP 250 OP 250 PS 637: Performed by: NURSE ANESTHETIST, CERTIFIED REGISTERED

## 2018-02-02 PROCEDURE — 40000277 XR SURGERY CARM FLUORO LESS THAN 5 MIN W STILLS

## 2018-02-02 PROCEDURE — 40000305 ZZH STATISTIC PRE PROC ASSESS I: Performed by: ORTHOPAEDIC SURGERY

## 2018-02-02 RX ORDER — LIDOCAINE HYDROCHLORIDE 10 MG/ML
INJECTION, SOLUTION INFILTRATION; PERINEURAL PRN
Status: DISCONTINUED | OUTPATIENT
Start: 2018-02-02 | End: 2018-02-02 | Stop reason: HOSPADM

## 2018-02-02 RX ORDER — ONDANSETRON 2 MG/ML
4 INJECTION INTRAMUSCULAR; INTRAVENOUS EVERY 30 MIN PRN
Status: DISCONTINUED | OUTPATIENT
Start: 2018-02-02 | End: 2018-02-02 | Stop reason: HOSPADM

## 2018-02-02 RX ORDER — FENTANYL CITRATE 50 UG/ML
25-50 INJECTION, SOLUTION INTRAMUSCULAR; INTRAVENOUS EVERY 5 MIN PRN
Status: DISCONTINUED | OUTPATIENT
Start: 2018-02-02 | End: 2018-02-02 | Stop reason: HOSPADM

## 2018-02-02 RX ORDER — ONDANSETRON 4 MG/1
4 TABLET, ORALLY DISINTEGRATING ORAL EVERY 30 MIN PRN
Status: DISCONTINUED | OUTPATIENT
Start: 2018-02-02 | End: 2018-02-02 | Stop reason: HOSPADM

## 2018-02-02 RX ORDER — BACITRACIN ZINC 500 [USP'U]/G
OINTMENT TOPICAL PRN
Status: DISCONTINUED | OUTPATIENT
Start: 2018-02-02 | End: 2018-02-02 | Stop reason: HOSPADM

## 2018-02-02 RX ORDER — CEFAZOLIN SODIUM 1 G/3ML
1 INJECTION, POWDER, FOR SOLUTION INTRAMUSCULAR; INTRAVENOUS SEE ADMIN INSTRUCTIONS
Status: DISCONTINUED | OUTPATIENT
Start: 2018-02-02 | End: 2018-02-02 | Stop reason: HOSPADM

## 2018-02-02 RX ORDER — SODIUM CHLORIDE, SODIUM LACTATE, POTASSIUM CHLORIDE, CALCIUM CHLORIDE 600; 310; 30; 20 MG/100ML; MG/100ML; MG/100ML; MG/100ML
INJECTION, SOLUTION INTRAVENOUS CONTINUOUS
Status: DISCONTINUED | OUTPATIENT
Start: 2018-02-02 | End: 2018-02-02 | Stop reason: HOSPADM

## 2018-02-02 RX ORDER — MEPERIDINE HYDROCHLORIDE 25 MG/ML
12.5 INJECTION INTRAMUSCULAR; INTRAVENOUS; SUBCUTANEOUS
Status: DISCONTINUED | OUTPATIENT
Start: 2018-02-02 | End: 2018-02-02 | Stop reason: HOSPADM

## 2018-02-02 RX ORDER — ACETAMINOPHEN 325 MG/1
975 TABLET ORAL ONCE
Status: COMPLETED | OUTPATIENT
Start: 2018-02-02 | End: 2018-02-02

## 2018-02-02 RX ORDER — CEFAZOLIN SODIUM 2 G/100ML
2 INJECTION, SOLUTION INTRAVENOUS
Status: COMPLETED | OUTPATIENT
Start: 2018-02-02 | End: 2018-02-02

## 2018-02-02 RX ORDER — BUPIVACAINE HYDROCHLORIDE 5 MG/ML
INJECTION, SOLUTION PERINEURAL PRN
Status: DISCONTINUED | OUTPATIENT
Start: 2018-02-02 | End: 2018-02-02 | Stop reason: HOSPADM

## 2018-02-02 RX ORDER — FENTANYL CITRATE 50 UG/ML
INJECTION, SOLUTION INTRAMUSCULAR; INTRAVENOUS PRN
Status: DISCONTINUED | OUTPATIENT
Start: 2018-02-02 | End: 2018-02-02

## 2018-02-02 RX ORDER — SODIUM CHLORIDE, SODIUM LACTATE, POTASSIUM CHLORIDE, CALCIUM CHLORIDE 600; 310; 30; 20 MG/100ML; MG/100ML; MG/100ML; MG/100ML
INJECTION, SOLUTION INTRAVENOUS CONTINUOUS PRN
Status: DISCONTINUED | OUTPATIENT
Start: 2018-02-02 | End: 2018-02-02

## 2018-02-02 RX ORDER — CELECOXIB 200 MG/1
200 CAPSULE ORAL ONCE
Status: COMPLETED | OUTPATIENT
Start: 2018-02-02 | End: 2018-02-02

## 2018-02-02 RX ORDER — PROMETHAZINE HYDROCHLORIDE 25 MG/ML
12.5 INJECTION, SOLUTION INTRAMUSCULAR; INTRAVENOUS
Status: DISCONTINUED | OUTPATIENT
Start: 2018-02-02 | End: 2018-02-02 | Stop reason: HOSPADM

## 2018-02-02 RX ORDER — NALOXONE HYDROCHLORIDE 0.4 MG/ML
.1-.4 INJECTION, SOLUTION INTRAMUSCULAR; INTRAVENOUS; SUBCUTANEOUS
Status: DISCONTINUED | OUTPATIENT
Start: 2018-02-02 | End: 2018-02-02 | Stop reason: HOSPADM

## 2018-02-02 RX ORDER — PROPOFOL 10 MG/ML
INJECTION, EMULSION INTRAVENOUS CONTINUOUS PRN
Status: DISCONTINUED | OUTPATIENT
Start: 2018-02-02 | End: 2018-02-02

## 2018-02-02 RX ADMIN — ACETAMINOPHEN 975 MG: 325 TABLET, FILM COATED ORAL at 08:11

## 2018-02-02 RX ADMIN — FENTANYL CITRATE 100 MCG: 50 INJECTION, SOLUTION INTRAMUSCULAR; INTRAVENOUS at 10:25

## 2018-02-02 RX ADMIN — PROPOFOL 150 MCG/KG/MIN: 10 INJECTION, EMULSION INTRAVENOUS at 10:25

## 2018-02-02 RX ADMIN — MIDAZOLAM HYDROCHLORIDE 2 MG: 1 INJECTION, SOLUTION INTRAMUSCULAR; INTRAVENOUS at 10:21

## 2018-02-02 RX ADMIN — SODIUM CHLORIDE, POTASSIUM CHLORIDE, SODIUM LACTATE AND CALCIUM CHLORIDE: 600; 310; 30; 20 INJECTION, SOLUTION INTRAVENOUS at 10:21

## 2018-02-02 RX ADMIN — MIDAZOLAM HYDROCHLORIDE 2 MG: 1 INJECTION, SOLUTION INTRAMUSCULAR; INTRAVENOUS at 10:23

## 2018-02-02 RX ADMIN — MIDAZOLAM HYDROCHLORIDE 1 MG: 1 INJECTION, SOLUTION INTRAMUSCULAR; INTRAVENOUS at 10:25

## 2018-02-02 RX ADMIN — CEFAZOLIN SODIUM 2 G: 2 INJECTION, SOLUTION INTRAVENOUS at 10:17

## 2018-02-02 RX ADMIN — CELECOXIB 200 MG: 200 CAPSULE ORAL at 08:11

## 2018-02-02 NOTE — IP AVS SNAPSHOT
MRN:[id number]                      After Visit Summary   2/2/2018    Ed Jonathan    MRN: [id number]           Thank you!     Thank you for choosing [facility] for your care. Our goal is always to provide you with excellent care. Hearing back from our patients is one way we can continue to improve our services. Please take a few minutes to complete the written survey that you may receive in the mail after you visit with us. Thank you!        Patient Information     Date Of Birth          1970        About your hospital stay     You were admitted on:  February 2, 2018 You last received care in the:  [facility] PreOP/Phase II    You were discharged on:  February 2, 2018        Reason for your hospital stay       Fingertip amputation                  Who to Call     For medical emergencies, please call 911.  For non-urgent questions about your medical care, please call your primary care provider or clinic, [phone]  For questions related to your surgery, please call your surgery clinic        Attending Provider     Provider Denise Rodgers MD Orthopedics       Primary Care Provider Office Phone # Fax #    Abigail Gregory PA-C [phone]       When to contact your care team       Call your primary doctor if you have any of the following: chest pain, troubles breathing, increased shortness of breath.  Call [facility] ([phone]) if you have any of the following: temperature greater than 101 F, pain not controlled with elevation or pain medications, drainage that saturates the bandage.                  After Care Instructions     Activity       Keep your arm elevated above your heart for the next 2-3 days.  This will limit swelling and help with pain control.  It is ok to apply an ice bag to the area, but make sure there is no leak or chance for condensation to cause your dressing to get damp.  Wet dressings can lead to infection.  Keep  your sterile surgical dressing clean and dry.  Please do no remove before Tuesday morning.  Cover your arm with plastic bags secured around the upper arm if showering and hold your arm outside of the shower.  On Tuesday, you may begin dressing changes to the surgical fingertip as needed.  Cover with clean gauze and lightly wrapped coban.  Please keep a dressing on the fingertip until your follow-up appointment  OK to move your fingers, wrist, and elbow.  No lifting, pushing, pulling more than 10 lbs with the surgical extremity.  No repetitive activities with the surgical hand/wrist.            Diet       Resume your regular diet                  Follow-up Appointments     Follow-up and recommended labs and tests        Follow up with Dr. Lyle in 7-10 days at [facility] ([phone]).  You may need to call to schedule this appointment if you do not already have a follow-up appointment scheduled.                  Further instructions from your care team                           Same Day Surgery Discharge Instructions  Special Precautions After Surgery - Adult    1. It is not unusual to feel lightheaded or faint, up to 24 hours after surgery or while taking pain medication.  If you have these symptoms; sit for a few minutes before standing and have someone assist you when getting up.  2. You should rest and relax for the next 24 hours and must have someone stay with you for at least 24 hours after your discharge.  3. DO NOT DRIVE any vehicle or operate mechanical equipment for 24 hours following the end of your surgery.  DO NOT DRIVE while taking narcotic pain medications that have been prescribed by your physician.  If you had a limb operated on, you must be able to use it fully to drive.  4. DO NOT drink alcoholic beverages for 24 hours following surgery or while taking prescription pain medication.  5. Drink clear liquids (apple juice, ginger ale, broth, 7-Up, etc.).  Progress to your regular diet  as you feel able.  6. Any questions call your physician and do not make important decisions for 24 hours.  __________________________________________________________________________________________________________________________________  IMPORTANT NUMBERS:    [facility] [place] Number:  [phone], [phone]  Pharmacy:  [phone]  Same Day Surgery:  [phone], Monday - Friday until 8:30 p.m.  Urgent Care:  [phone]  Emergency Room:  [phone]      [facility]:  [phone]                                                                             [facility]:  [phone] option 1  [facility]:  [phone]     OB Clinic:  [phone]   [facility]gery Specialty Clinic:  [phone]   Home Medical Equipment: [phone]  [facility]:  [phone]                        Same Day Surgery Discharge Instructions  Special Precautions After Surgery - Adult    7. It is not unusual to feel lightheaded or faint, up to 24 hours after surgery or while taking pain medication.  If you have these symptoms; sit for a few minutes before standing and have someone assist you when getting up.  8. You should rest and relax for the next 24 hours and must have someone stay with you for at least 24 hours after your discharge.  9. DO NOT DRIVE any vehicle or operate mechanical equipment for 24 hours following the end of your surgery.  DO NOT DRIVE while taking narcotic pain medications that have been prescribed by your physician.  If you had a limb operated on, you must be able to use it fully to drive.  10. DO NOT drink alcoholic beverages for 24 hours following surgery or while taking prescription pain medication.  11. Drink clear liquids (apple juice, ginger ale, broth, 7-Up, etc.).  Progress to your regular diet as you feel able.  12. Any questions call your physician and do not make important decisions for 24 hours.    ACTIVITY  ? { :1970611}     INCISIONAL CARE  ? {  ":8191746}     Call for an appointment to return to the clinic { :5950049}    Medications:  ? { :6224836::\"Follow the instructions on the bottle.\"}     Additional discharge instructions: { :6486878::\"None\"}  __________________________________________________________________________________________________________________________________  IMPORTANT NUMBERS:    [facility] Main Number:  [phone], [phone]  Pharmacy:  [phone]  Same Day Surgery:  [phone], Monday - Friday until 8:30 p.m.  Urgent Care:  [phone]  Emergency Room:  [phone]      [facility]:  [phone]                                                                             [facility]:  [phone] option 1  [facility]:  [phone]     OB Clinic:  [phone]   Surgery Specialty Clinic:  [phone]   Home Medical Equipment: [phone]  [facility]:  [phone]        Pending Results     Date and Time Order Name Status Description    2/2/2018 0516 XR Surgery ROSSANA Fluoro L/T 5 Min w Stills In process             Admission Information     Date & Time Provider Department Dept. Phone    2/2/2018 Denise Lyle MD [facility] PreOP/Phase [phone]      Your Vitals Were     Blood Pressure Pulse Temperature Respirations Height Weight    109/77 75 98.1  F (36.7  C) 16 1.702 m (5' 7\") 73.5 kg (162 lb)    Pulse Oximetry BMI (Body Mass Index)                98% 25.37 kg/m2          MyChart Information     MyChart gives you secure access to your electronic health record. If you see a primary care provider, you can also send messages to your care team and make appointments. If you have questions, please call your primary care clinic.  If you do not have a primary care provider, please call [phone] and they will assist you.        Care EveryWhere ID     This is your Care EveryWhere ID. This could be used by other organizations to access your [place] medical " records  [id number]        Equal Access to Services     ELIZA JON : Hadii amy ku hadlucero Soharveyali, waaxda luqadaha, qaybta kaalmada dee deedeeptitalya, waxlj jaime michgenia storyjennifferaleksandr lambert. So [facility] [phone].    ATENCIÓN: Si habla español, tiene a sultana disposición servicios gratuitos de asistencia lingüística. Llame al [phone].    We comply with applicable federal civil rights laws and [place] laws. We do not discriminate on the basis of race, color, national origin, age, disability, sex, sexual orientation, or gender identity.               Review of your medicines      CONTINUE these medicines which have NOT CHANGED        Dose / Directions    acetaminophen-caffeine 500-65 MG Tabs   Commonly known as:  EXCEDRIN TENSION HEADACHE   Used for:  Nail fungal infection        Dose:  3 tablet   Take 3 tablets by mouth every 6 hours as needed for mild pain   Refills:  0       albuterol-ipratropium  MCG/ACT Inhaler   Commonly known as:  COMBIVENT   Used for:  Cough        Dose:  2 puff   Inhale 2 puffs into the lungs 4 times daily. Needs appointment   Quantity:  1 Inhaler   Refills:  11       ALEVE PO        Dose:  220 mg   Take 220 mg by mouth 2 times daily as needed for moderate pain   Refills:  0       amitriptyline 50 MG tablet   Commonly known as:  ELAVIL   Used for:  Left-sided low back pain with left-sided sciatica, unspecified chronicity        Dose:  50 mg   Take 1 tablet (50 mg) by mouth At Bedtime   Quantity:  30 tablet   Refills:  11       BENADRYL cream   Generic drug:  diphenhydrAMINE-zinc acetate        Dose:  50 mg   Apply 50 mg topically as needed.   Refills:  0       cetirizine 10 MG tablet   Commonly known as:  zyrTEC   Used for:  Hives        Dose:  10 mg   Take 1 tablet by mouth 2 times daily.   Quantity:  60 tablet   Refills:  11       EPINEPHrine 0.3 MG/0.3ML injection   Commonly known as:  EPIPEN        Dose:  0.3 mg   Inject 0.3 mg into the muscle once as needed.   Refills:  0        fexofenadine 180 MG tablet   Commonly known as:  ALLEGRA   Used for:  Hives        Dose:  180 mg   Take 1 tablet by mouth daily. Take upon awakening.   Quantity:  30 tablet   Refills:  11       sildenafil 100 MG tablet   Commonly known as:  VIAGRA        Dose:   mg   Take 0.5-1 tablets ( mg) by mouth daily as needed for erectile dysfunction Take 30 min to 4 hours before intercourse.   Quantity:  6 tablet   Refills:  11         STOP taking     oxyCODONE IR 5 MG tablet   Commonly known as:  ROXICODONE                    Protect others around you: Learn how to safely use, store and throw away your medicines at www.disposemymeds.org.             Medication List: This is a list of all your medications and when to take them. Check marks below indicate your daily home schedule. Keep this list as a reference.      Medications           Morning Afternoon Evening Bedtime As Needed    acetaminophen-caffeine 500-65 MG Tabs   Commonly known as:  EXCEDRIN TENSION HEADACHE   Take 3 tablets by mouth every 6 hours as needed for mild pain                                albuterol-ipratropium  MCG/ACT Inhaler   Commonly known as:  COMBIVENT   Inhale 2 puffs into the lungs 4 times daily. Needs appointment                                ALEVE PO   Take 220 mg by mouth 2 times daily as needed for moderate pain                                amitriptyline 50 MG tablet   Commonly known as:  ELAVIL   Take 1 tablet (50 mg) by mouth At Bedtime                                BENADRYL cream   Apply 50 mg topically as needed.   Generic drug:  diphenhydrAMINE-zinc acetate                                cetirizine 10 MG tablet   Commonly known as:  zyrTEC   Take 1 tablet by mouth 2 times daily.                                EPINEPHrine 0.3 MG/0.3ML injection   Commonly known as:  EPIPEN   Inject 0.3 mg into the muscle once as needed.                                fexofenadine 180 MG tablet   Commonly known as:  ALLEGRA   Take 1  tablet by mouth daily. Take upon awakening.                                sildenafil 100 MG tablet   Commonly known as:  VIAGRA   Take 0.5-1 tablets ( mg) by mouth daily as needed for erectile dysfunction Take 30 min to 4 hours before intercourse.

## 2018-02-02 NOTE — DISCHARGE INSTRUCTIONS
Same Day Surgery Discharge Instructions  Special Precautions After Surgery - Adult    1. It is not unusual to feel lightheaded or faint, up to 24 hours after surgery or while taking pain medication.  If you have these symptoms; sit for a few minutes before standing and have someone assist you when getting up.  2. You should rest and relax for the next 24 hours and must have someone stay with you for at least 24 hours after your discharge.  3. DO NOT DRIVE any vehicle or operate mechanical equipment for 24 hours following the end of your surgery.  DO NOT DRIVE while taking narcotic pain medications that have been prescribed by your physician.  If you had a limb operated on, you must be able to use it fully to drive.  4. DO NOT drink alcoholic beverages for 24 hours following surgery or while taking prescription pain medication.  5. Drink clear liquids (apple juice, ginger ale, broth, 7-Up, etc.).  Progress to your regular diet as you feel able.  6. Any questions call your physician and do not make important decisions for 24 hours.  __________________________________________________________________________________________________________________________________  IMPORTANT NUMBERS:    [facility] [place]  [phone], [phone]  Pharmacy:  [phone]  Same Day Surgery:  [phone], Monday - Friday until 8:30 p.m.  Urgent Care:  [phone]  Emergency Room:  [phone]      [facility]:  [phone]                                                                             [facility]:  [phone] option 1  [facility]:  [phone]     [facility]:  [phone]   [facility]:  [phone]   [facility] Medical Equipment: [phone]  [facility]:  [phone]                        Same Day Surgery Discharge Instructions  Special Precautions After Surgery - Adult    7. It is not unusual to feel lightheaded or faint, up to  "24 hours after surgery or while taking pain medication.  If you have these symptoms; sit for a few minutes before standing and have someone assist you when getting up.  8. You should rest and relax for the next 24 hours and must have someone stay with you for at least 24 hours after your discharge.  9. DO NOT DRIVE any vehicle or operate mechanical equipment for 24 hours following the end of your surgery.  DO NOT DRIVE while taking narcotic pain medications that have been prescribed by your physician.  If you had a limb operated on, you must be able to use it fully to drive.  10. DO NOT drink alcoholic beverages for 24 hours following surgery or while taking prescription pain medication.  11. Drink clear liquids (apple juice, ginger ale, broth, 7-Up, etc.).  Progress to your regular diet as you feel able.  12. Any questions call your physician and do not make important decisions for 24 hours.    ACTIVITY  ? { :8360020}     INCISIONAL CARE  ? { :4692285}     Call for an appointment to return to the clinic { :0021374}    Medications:  ? { :6595071::\"Follow the instructions on the bottle.\"}     Additional discharge instructions: { :2172654::\"None\"}  __________________________________________________________________________________________________________________________________  IMPORTANT NUMBERS:    [facility] Main Number:  [phone], [phone]  Pharmacy:  [phone]  Same Day Surgery:  [phone], Monday - Friday until 8:30 p.m.  Urgent Care:  [phone]  Emergency Room:  [phone]      [facility]:  [phone]                                                                             [facility]:  [phone] option 1  [facility]:  [phone]     [facility]:  [phone]   [facility]:  [phone]   [facility]: [phone]  [facility]:  [phone]      "

## 2018-02-02 NOTE — OP NOTE
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      PROCEDURE PERFORMED:  Revision left long fingertip amputation.      SURGEON:  Denise Lyle MD   ASSISTANT:  None.      ANESTHESIA:  Monitored anesthesia care with local anesthetic for a digital nerve block to the left long finger.   ESTIMATED BLOOD LOSS:  Minimal.   DRAINS:  None.   SPECIMENS:  None.   COMPLICATIONS:  None known.      INDICATIONS:  The patient is a 47-year-old male who sustained a previous crush injury to his left long and ring fingers, resulting in partial fingertip amputation and comminuted tuft fractures.  The open wounds went on to heal uneventfully.  Upon full epithelialization of the healing granulation tissue, there was some contracture of the tissues over the distal aspect of the long finger, resulting in a small amount of exposed bone at the site of the fingertip amputation.  Treatment options were discussed with the patient at length, including the risks and benefits of continued conservative management versus surgical intervention.  He understands the risks of surgery include, but are not limited to the following:  Problems with anesthesia, infection, sensitive scars, wound healing problems, bleeding, persistent pain, complex regional pain syndrome, damage to surrounding anatomical structures including nerves, vessels, tendons, or the nail bed, need for additional surgery despite our efforts today, joint stiffness, problems returning to work or activity.  No guarantees were made or implied.  The patient expressed understanding and wished to proceed with surgical intervention.      DESCRIPTION OF PROCEDURE:  The patient was met in the preoperative holding, and the correct extremity, the left long finger, was identified and marked.  He was brought to the operative suite and placed supine on the  operating table.  Bony prominences were well padded.  A well-padded nonsterile tourniquet was placed on his left upper extremity.  Perioperative pause confirmed the correct patient, the correct procedure, and the correct extremity.  IV antibiotics and IV sedation were administered by the Anesthesia Service.  Under aseptic technique, a digital block was performed to the left long finger with a 1:1 combination of 1% lidocaine and 0.5% Marcaine plain.  The left upper extremity was then prepped and draped in the standard sterile fashion.  The left arm was then elevated, exsanguinated with an Esmarch bandage, and the tourniquet insufflated to 250 mmHg.      Attention was directed to the left long finger, and a fish-mouth incision was made over the distal aspect.  The exposed bone was easily identified, and soft tissues were elevated off of the exposed bone.  Rongeur was used to resect the exposed bone and irregular aspect of the remaining comminuted tuft fracture.  A combination of rongeur and curet were used to contour the remaining distal phalanx back to a smooth and stable margin.  Biplanar fluoroscopy demonstrated a concentric DIP joint with a well-contoured remaining distal phalanx.  Wound was thoroughly irrigated.  Wound was then reapproximated with interrupted 3-0 chromic sutures.  Sterile dressings were applied consisting of bacitracin, Adaptic, 2 x 2 gauze, lightly wrapped Ruth Ann gauze, followed by a lightly wrapped Coban dressing.  Sedation was reversed, and the patient was taken to the recovery unit in stable condition, having tolerated the procedure well.  Sponge, needle and instrument counts were correct at the conclusion of the procedure, which was performed under loupe magnification.      POSTOPERATIVE PLAN:  The patient will return to clinic in approximately 7-10 days for wound check.  He may perform daily dressing changes starting on postoperative day 3.  Work note provided today for no use of the left  hand until his followup visit.  Prescription for Norco 5/325, dispense 15, with no refills provided.         CORINE MAIN MD             D: 2018   T: 2018   MT: DEIRDRE      Name:     ROSY ANDERSON   MRN:      [id number]        Account:        [id number]   :      1970           Procedure Date: 2018      Document: [id number]

## 2018-02-02 NOTE — IP AVS SNAPSHOT
[facility] PreOP/Phase II    [address] [address]    Phone:  [phone]    Fax:  [phone]                                       After Visit Summary   2/2/2018    Ed Jonathan    MRN: [id number]           After Visit Summary Signature Page     I have received my discharge instructions, and my questions have been answered. I have discussed any challenges I see with this plan with the nurse or doctor.    ..........................................................................................................................................  Patient/Patient Representative Signature      ..........................................................................................................................................  Patient Representative Print Name and Relationship to Patient    ..................................................               ................................................  Date                                            Time    ..........................................................................................................................................  Reviewed by Signature/Title    ...................................................              ..............................................  Date                                                            Time

## 2018-02-02 NOTE — ANESTHESIA CARE TRANSFER NOTE
Patient: Ed Jonathan    Procedure(s):  Left long finger revision amputation. - Wound Class: III-Contaminated    Diagnosis: s/p traumatic amputation  Diagnosis Additional Information: No value filed.    Anesthesia Type:   MAC     Note:  Airway :Room Air  Patient transferred to:Phase II  Comments: Patient to Phase II on RA/native airway and is awake with an jemma score >8. Report to RN and transfer of care.       Vitals: (Last set prior to Anesthesia Care Transfer)    CRNA VITALS  2/2/2018 1033 - 2/2/2018 1108      2/2/2018             Pulse: 80    SpO2: 98 %                Electronically Signed By: OLGA Bolaños CRNA  February 2, 2018  11:08 AM

## 2018-02-02 NOTE — ANESTHESIA POSTPROCEDURE EVALUATION
Patient: Ed Jonathan    Procedure(s):  Left long finger revision amputation. - Wound Class: III-Contaminated    Diagnosis:s/p traumatic amputation  Diagnosis Additional Information: No value filed.    Anesthesia Type:  MAC    Note:  Anesthesia Post Evaluation    Patient location during evaluation: Phase 2  Patient participation: Able to fully participate in evaluation  Level of consciousness: awake  Pain management: adequate  Airway patency: patent  Cardiovascular status: acceptable  Respiratory status: acceptable  Hydration status: acceptable  PONV: none     Anesthetic complications: None          Last vitals:  Vitals:    02/02/18 0700   BP: 125/88   Pulse: 83   Resp: 18   Temp: 36.7  C (98.1  F)   SpO2: 99%         Electronically Signed By: Jeancarlos Tracy CRNA, APRN CRNA  February 2, 2018  11:12 AM

## 2018-02-15 ENCOUNTER — TRANSFERRED RECORDS (OUTPATIENT)
Dept: HEALTH INFORMATION MANAGEMENT | Facility: CLINIC | Age: 48
End: 2018-02-15

## 2018-03-30 ENCOUNTER — TRANSFERRED RECORDS (OUTPATIENT)
Dept: HEALTH INFORMATION MANAGEMENT | Facility: CLINIC | Age: 48
End: 2018-03-30

## 2018-04-22 ENCOUNTER — NURSE TRIAGE (OUTPATIENT)
Dept: NURSING | Facility: CLINIC | Age: 48
End: 2018-04-22

## 2018-04-23 NOTE — TELEPHONE ENCOUNTER
Patient and wife calling. Permission to speak with wife obtained. Patient with chest pressure yesterday and again today since this morning along with feeling of heart racing/anxiety. Attributed symptoms to taking zyrtec. He took dose yesterday and today.  Wife took BP and pulse, both WNL.   Reason for Disposition    Chest pain lasts > 5 minutes (Exceptions: chest pain occurring > 3 days ago and now asymptomatic; same as previously diagnosed heartburn and has accompanying sour taste in mouth)    Additional Information    Negative: Severe difficulty breathing (e.g., struggling for each breath, speaks in single words)    Negative: Bluish lips, tongue, or face now    Negative: Difficult to awaken or acting confused  (e.g., disoriented, slurred speech)    Negative: Hysterical or combative behavior    Negative: Sounds like a life-threatening emergency to the triager    Chest pain    Negative: Severe difficulty breathing (e.g., struggling for each breath, speaks in single words)    Negative: Difficult to awaken or acting confused (e.g., disoriented, slurred speech)    Negative: Shock suspected (e.g., cold/pale/clammy skin, too weak to stand, low BP, rapid pulse)    Negative: [1] Chest pain lasts > 5 minutes AND [2] history of heart disease  (i.e., heart attack, bypass surgery, angina, angioplasty, CHF; not just a heart murmur)    Negative: [1] Chest pain lasts > 5 minutes AND [2] described as crushing, pressure-like, or heavy    Negative: [1] Chest pain lasts > 5 minutes AND [2] age > 50    Negative: [1] Chest pain lasts > 5 minutes AND [2] age > 30 AND [3] at least one cardiac risk factor (i.e., hypertension, diabetes, obesity, smoker or strong family history of heart disease)    Negative: [1] Chest pain lasts > 5 minutes AND [2] not relieved with nitroglycerin    Negative: Passed out (i.e., lost consciousness, collapsed and was not responding)    Negative: Heart beating < 50 beats per minute OR > 140 beats per minute     "Negative: Visible sweat on face or sweat dripping down face    Negative: Sounds like a life-threatening emergency to the triager    Negative: Followed a chest injury    Negative: SEVERE chest pain    Negative: [1] Intermittent  chest pain or \"angina\" AND [2] increasing in severity or frequency  (Exception: pains lasting a few seconds)    Negative: Pain also present in shoulder(s) or arm(s) or jaw  (Exception: pain is clearly made worse by movement)    Negative: Difficulty breathing    Negative: Dizziness or lightheadedness    Negative: Coughing up blood    Negative: Cocaine use within last 3 days    Negative: History of prior \"blood clot\" in leg or lungs (i.e., deep vein thrombosis, pulmonary embolism)    Negative: Recent illness requiring prolonged bedrest (i.e., immobilization)    Negative: Hip or leg fracture in past 2 months (e.g., had cast on leg or ankle)    Negative: Major surgery in the past month    Negative: Recent long-distance travel with prolonged time in car, bus, plane, or train (i.e., within past 2 weeks; 6 or  more hours duration)    Protocols used: CHEST PAIN-ADULT-AH, ANXIETY AND PANIC ATTACK-ADULT-AH    "

## 2018-06-22 ENCOUNTER — TRANSFERRED RECORDS (OUTPATIENT)
Dept: HEALTH INFORMATION MANAGEMENT | Facility: CLINIC | Age: 48
End: 2018-06-22

## 2018-06-28 ENCOUNTER — HOSPITAL ENCOUNTER (OUTPATIENT)
Dept: MRI IMAGING | Facility: CLINIC | Age: 48
Discharge: HOME | End: 2018-06-28
Attending: ORTHOPAEDIC SURGERY | Admitting: ORTHOPAEDIC SURGERY
Payer: COMMERCIAL

## 2018-06-28 DIAGNOSIS — M25.519 SHOULDER PAIN: ICD-10-CM

## 2018-06-28 PROCEDURE — 73221 MRI JOINT UPR EXTREM W/O DYE: CPT | Mod: LT

## 2018-07-02 ENCOUNTER — APPOINTMENT (OUTPATIENT)
Dept: GENERAL RADIOLOGY | Facility: CLINIC | Age: 48
End: 2018-07-02
Attending: EMERGENCY MEDICINE
Payer: COMMERCIAL

## 2018-07-02 ENCOUNTER — HOSPITAL ENCOUNTER (EMERGENCY)
Facility: CLINIC | Age: 48
Discharge: HOME | End: 2018-07-02
Attending: PHYSICIAN ASSISTANT | Admitting: PHYSICIAN ASSISTANT
Payer: COMMERCIAL

## 2018-07-02 ENCOUNTER — TRANSFERRED RECORDS (OUTPATIENT)
Dept: HEALTH INFORMATION MANAGEMENT | Facility: CLINIC | Age: 48
End: 2018-07-02

## 2018-07-02 VITALS
RESPIRATION RATE: 16 BRPM | DIASTOLIC BLOOD PRESSURE: 92 MMHG | BODY MASS INDEX: 26.37 KG/M2 | HEIGHT: 67 IN | WEIGHT: 168 LBS | OXYGEN SATURATION: 99 % | SYSTOLIC BLOOD PRESSURE: 139 MMHG | TEMPERATURE: 99 F

## 2018-07-02 DIAGNOSIS — J40 BRONCHITIS: ICD-10-CM

## 2018-07-02 DIAGNOSIS — J01.01 ACUTE RECURRENT MAXILLARY SINUSITIS: ICD-10-CM

## 2018-07-02 PROCEDURE — 71046 X-RAY EXAM CHEST 2 VIEWS: CPT

## 2018-07-02 PROCEDURE — G0463 HOSPITAL OUTPT CLINIC VISIT: HCPCS | Mod: 25

## 2018-07-02 PROCEDURE — 99213 OFFICE O/P EST LOW 20 MIN: CPT | Performed by: PHYSICIAN ASSISTANT

## 2018-07-02 RX ORDER — EPINEPHRINE 0.3 MG/.3ML
0.3 INJECTION SUBCUTANEOUS
Qty: 0.6 ML | Refills: 0 | Status: SHIPPED | OUTPATIENT
Start: 2018-07-02 | End: 2020-11-12

## 2018-07-02 RX ORDER — PREDNISONE 20 MG/1
TABLET ORAL
Qty: 10 TABLET | Refills: 0 | Status: SHIPPED | OUTPATIENT
Start: 2018-07-02 | End: 2019-01-21

## 2018-07-02 RX ORDER — AZITHROMYCIN 250 MG/1
TABLET, FILM COATED ORAL
Qty: 6 TABLET | Refills: 0 | Status: SHIPPED | OUTPATIENT
Start: 2018-07-02 | End: 2018-07-07

## 2018-07-02 RX ORDER — ALBUTEROL SULFATE 90 UG/1
2 AEROSOL, METERED RESPIRATORY (INHALATION) EVERY 6 HOURS PRN
Qty: 1 INHALER | Refills: 0 | Status: SHIPPED | OUTPATIENT
Start: 2018-07-02 | End: 2019-09-05

## 2018-07-02 RX ORDER — ALBUTEROL SULFATE 0.83 MG/ML
2.5 SOLUTION RESPIRATORY (INHALATION) EVERY 4 HOURS PRN
Qty: 1 BOX | Refills: 0 | Status: SHIPPED | OUTPATIENT
Start: 2018-07-02 | End: 2019-01-21

## 2018-07-02 NOTE — ED PROVIDER NOTES
History     Chief Complaint   Patient presents with     Cough     x 3 weeks     HPI  Ed Jonathan is a 48 year old male with past medical history significant for asthma, postconcussion syndrome, depression who presents to the urgent care with concern over cough which been present for the last 3 weeks.  He additionally complains of subjective fever, chills, myalgias, nasal congestion, postnasal drainage, sore throat, shortness of breath and wheezing.  He has some left-sided chest pain which is exacerbated by coughing or when lying to sleep at night.  He has been treating with albuterol inhaler and neb solution with temporary improvement.  He did have also contacts also developed similar symptoms however not as severe.  He is a former smoker who quit more than ten years ago.      Problem List:    Patient Active Problem List    Diagnosis Date Noted     Postconcussion syndrome 03/25/2016     Priority: Medium     Saw Dr Morillo at [facility] on 3/22/16 - MRI brain and neck, starting elavil 25, vestibular therapy, ongoing Physical Therapy, recheck 4-6 wks.  Note sent to scanning.       Shoulder joint pain, unspecified laterality 02/16/2016     Priority: Medium     Saw [facility] 1/21/16 - impingement syndrome and adhesive capsulitis both shoulders, recommended re-trying cortisone shots.         CARDIOVASCULAR SCREENING; LDL GOAL LESS THAN 160 10/31/2010     Priority: Medium     Mild major depression (H) 08/03/2007     Priority: Medium        Past Medical History:    No past medical history on file.    Past Surgical History:    Past Surgical History:   Procedure Laterality Date     AMPUTATE FINGER(S) Left 2/2/2018    Procedure: AMPUTATE FINGER(S);  Left long finger revision amputation.;  Surgeon: Denise Lyle MD;  Location: [facility]     ARTHROTOMY SHOULDER, ROTATOR CUFF REPAIR, COMBINED Right 5/2/2016    Procedure: COMBINED ARTHROTOMY SHOULDER, ROTATOR CUFF REPAIR;  Surgeon: Rafael Pacheco MD;   Location: [facility] OR     ARTHROTOMY SHOULDER, SUBACROMIAL DECOMPRESSION, COMBINED Left 6/13/2016    Procedure: COMBINED ARTHROTOMY SHOULDER, SUBACROMIAL DECOMPRESSION;  Surgeon: Rafael Pacheco MD;  Location: [facility] OR     DESTRUCTION OF PARAVERTEBRAL FACETCERVICAL / THORACIC SINGLE Right 8/25/2017    Procedure: DESTRUCTION OF PARAVERTEBRAL FACET CERVICAL / THORACIC SINGLE;  Right Radiofrequency Ablation of the Cervical 3rd occipital nerve, C3. 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      HC FULL THICK GRAFT HEAD/AXIL/GEN/HND/FT <=20 CM  8/31/2003    Full thickness skin graft to the donor site of the cross finger flap      SURGICAL HISTORY OF -   9/22/2003    Division of left cross finger flap     SURGICAL HISTORY OF -   2003    left ACL repair and partial menisectomy       Family History:    Family History   Problem Relation Age of Onset     HEART DISEASE Mother      Diabetes Father      Cerebrovascular Disease Father      Depression Sister      Obesity Sister      Obesity Sister      Social History:  Marital Status:   [2]  Social History   Substance Use Topics     Smoking status: Former Smoker     Packs/day: 1.50     Years: 17.00     Quit date: 6/20/2005     Smokeless tobacco: Never Used      Comment: around second hand smoke daily     Alcohol use Yes      Comment: occ      Medications:      acetaminophen-caffeine (EXCEDRIN TENSION HEADACHE) 500-65 MG TABS   albuterol-ipratropium (COMBIVENT)  MCG/ACT inhaler   amitriptyline (ELAVIL) 50 MG tablet   cetirizine (ZYRTEC) 10 MG tablet   diphenhydrAMINE-zinc acetate (BENADRYL) cream   EPINEPHrine (EPIPEN) 0.3 MG/0.3ML injection   fexofenadine (ALLEGRA) 180 MG tablet   Naproxen Sodium (ALEVE PO)   sildenafil (VIAGRA) 100 MG tablet     Review of Systems  CONSTITUTIONAL:POSITIVE  for subjective  "fever, chills, myalgias   INTEGUMENTARY/SKIN: NEGATIVE for worrisome rashes, moles or lesions  EYES: NEGATIVE for vision changes or irritation  ENT/MOUTH: POSITIVE for sore throat, nasal congestion, post-nasal drainage and NEGATIVE for ear pain  RESP:POSITIVE for cough, shortness of breath, wheezing   GI: NEGATIVE for nausea, vomiting, diarrhea, abdomina pain   Physical Exam   BP: (!) 139/92  Heart Rate: 94  Temp: 99  F (37.2  C)  Resp: 16  Height: 170.2 cm (5' 7\")  Weight: 76.2 kg (168 lb)  SpO2: 99 %  Physical Exam  GENERAL APPEARANCE: healthy, alert and no distress  EYES: EOMI,  PERRL, conjunctiva clear  HENT: ear canals and TM's normal.  Nasal discharge present.  Posterior pharynx is nonerythematous without exudate  NECK: supple, nontender, no lymphadenopathy  RESP: lungs clear to auscultation - no rales, rhonchi or wheezes   CV: regular rates and rhythm, normal S1 S2, no murmur noted, there is reproducible tenderness palpationof the left chest wall near costosternal border  SKIN: no suspicious lesions or rashes  ED Course     ED Course     Procedures               Critical Care time:  none               Results for orders placed or performed during the hospital encounter of 07/02/18 (from the past 24 hour(s))   Chest XR,  PA & LAT    Narrative    CHEST TWO VIEWS   7/2/2018 11:41 AM     HISTORY: Cough.     COMPARISON: Chest x-ray 3/16/2015.      Impression    IMPRESSION: PA and lateral views of the chest. Lungs are clear. Heart  is normal in size. No effusions are evident. No pneumothorax.    KARUNA DUVALL MD       Medications - No data to display     PERC Rule for risk stratifying PE to low risk (calculator)  Background  Risk stratifies patients to low risk of PE if all 8 criteria are present including age <50, heart rate <100, O2 Sat >94%, no unilateral leg edema, no hemoptysis, no recent surgery or trauma, no prior VTE, and no hormone use.  Data  48 year old  has Mild major depression (H); CARDIOVASCULAR " SCREENING; LDL GOAL LESS THAN 160; Shoulder joint pain, unspecified laterality; and Postconcussion syndrome on his problem list.  @[email or web address]@   has a past surgical history that includes FORMATION DIRECT/TUBED PEDICLE, FH/CHK/CHN/MTH/NCK/LYUDMILA/GEN/HND/FT (8/31/2003); FULL THICK GRAFT HEAD/AXIL/GEN/HND/FT <=20 CM (8/31/2003); surgical history of -  (9/22/2003); surgical history of -  (2003); Arthrotomy shoulder, rotator cuff repair, combined (Right, 5/2/2016); Arthrotomy shoulder, subacromial decompression, combined (Left, 6/13/2016); Destruction Of Paravertebral Facetcervical / Thoracic Single (Right, 8/25/2017); and Amputate finger(s) (Left, 2/2/2018).     SpO2: 99 %  Criteria   Of  8 possible items (all criteria must be present):  Age <50 years  Heart rate <100 bpm  Oxygen Saturation >94%  No unilateral leg swelling  No hemoptysis  No surgery or trauma within 4 weeks  No prior DVT or PE  No hormone use (oral, transdermal and intravaginal estrogens)  Interpretation  All eight criteria are met AND low clinical PE suspicion: No further evaluation for PE required          Assessments & Plan (with Medical Decision Making)     I have reviewed the nursing notes.    I have reviewed the findings, diagnosis, plan and need for follow up with the patient.     Discharge Medication List as of 7/2/2018 12:41 PM      START taking these medications    Details   albuterol (2.5 MG/3ML) 0.083% neb solution Take 1 vial (2.5 mg) by nebulization every 4 hours as needed for shortness of breath / dyspnea, Disp-1 Box, R-0, E-Prescribe      albuterol (PROAIR HFA/PROVENTIL HFA/VENTOLIN HFA) 108 (90 Base) MCG/ACT Inhaler Inhale 2 puffs into the lungs every 6 hours as needed for shortness of breath / dyspnea or wheezing, Disp-1 Inhaler, R-0, E-Prescribe      azithromycin (ZITHROMAX Z-CATRACHO) 250 MG tablet Two tablets on the first day, then one tablet daily for the next 4 days, Disp-6 tablet, R-0, E-Prescribe      EPINEPHrine (EPIPEN/ADRENACLICK/OR ANY  BX GENERIC EQUIV) 0.3 MG/0.3ML injection 2-pack Inject 0.3 mLs (0.3 mg) into the muscle once as needed for anaphylaxis, Disp-0.6 mL, R-0, E-Prescribe      predniSONE (DELTASONE) 20 MG tablet Take two tablets (= 40mg) each day for 5 (five) days, Disp-10 tablet, R-0, E-Prescribe           Final diagnoses:   Bronchitis   Acute recurrent maxillary sinusitis     48-year-old male presents to the urgent care with concern over 3 week history of cough accompanied by intermittent subjective fever, chills, myalgias, shortness of breath and wheezing.  He had stable vital signs upon arrival.  Physical exam findings as described above were benign aside from reproducible tenderness palpation of the left costosternal border.  As part of evaluation patient did have chest x-ray which was negative for acute infiltrate, pneumothorax, pleural effusion or change in cardiopulmonary vasculature.  Symptoms are consistent with acute bronchitis.  Differential would also include sinusitis.  Lower suspicion for allergic rhinitis, pertussis.  Patient stratifies to a low risk using per criteria and as I do not suspect PE will defer further evaluation at this time.  Patient was discharged home stable with prescriptions for albuterol inhaler, prednisone.  Will also cover with antibiotic, Z-Adryan given.  Patient did request refill of his EpiPen as he is currently out which I did prescribe as well.  Follow-up with primary care provider if no improvement within the next 3-5 days.  Worrisome reasons to return to the ER/[facility] sooner discussed.    Disclaimer: This note consists of symbols derived from keyboarding, dictation, and/or voice recognition software. As a result, there may be errors in the script that have gone undetected.  Please consider this when interpreting information found in the chart.      7/2/2018   [facility] EMERGENCY DEPARTMENT     Mickie Kohler PA-C  07/02/18 [id number]

## 2018-07-02 NOTE — LETTER
[facility] EMERGENCY DEPARTMENT  [address] [address]  Phone: [phone]  Fax: [phone]    July 2, 2018        Ed Jonathan  [address] [place] [facility] [place] [address]          To whom it may concern:    RE: Ed Jonathan    Ed as evaluated in the urgent care for an illness on 7/2/18.  He may return to activity only  as tolerated by his symptoms.      Please contact me for questions or concerns.      Sincerely,        Mickie Kohler PA-C

## 2018-07-02 NOTE — ED AVS SNAPSHOT
[facility] Emergency Department    [address] [facility] [address]    Phone:  [phone]    Fax:  [phone]                                       Dann Castillo   MRN: [id number]    Department:  [facility] Emergency Department   Date of Visit:  7/2/2018           After Visit Summary Signature Page     I have received my discharge instructions, and my questions have been answered. I have discussed any challenges I see with this plan with the nurse or doctor.    ..........................................................................................................................................  Patient/Patient Representative Signature      ..........................................................................................................................................  Patient Representative Print Name and Relationship to Patient    ..................................................               ................................................  Date                                            Time    ..........................................................................................................................................  Reviewed by Signature/Title    ...................................................              ..............................................  Date                                                            Time

## 2018-07-02 NOTE — ED AVS SNAPSHOT
[facility] Emergency Department    [address]    [facility] [address]    Phone:  [phone]    Fax:  [phone]                                       Dann Castillo   MRN: [id number]    Department:  [facility] Emergency Department   Date of Visit:  7/2/2018           Patient Information     Date Of Birth          1970        Your diagnoses for this visit were:     Bronchitis     Acute recurrent maxillary sinusitis        You were seen by Mickie Kohler PA-C.      Follow-up Information     Follow up with Abigail Gregory PA-C In 3 days.    Specialty:  Physician Assistant    Why:  As needed, If symptoms worsen    Contact information:    [address] [place] [address]  [phone]        Discharge References/Attachments     BRONCHITIS, ACUTE (ENGLISH)      24 Hour Appointment Hotline       To make an appointment at any [facility], call [phone] ([phone]). If you don't have a family doctor or clinic, we will help you find one. [facility] clinics are conveniently located to serve the needs of you and your family.             Review of your medicines      START taking        Dose / Directions Last dose taken    * albuterol 108 (90 Base) MCG/ACT Inhaler   Commonly known as:  PROAIR HFA/PROVENTIL HFA/VENTOLIN HFA   Dose:  2 puff   Quantity:  1 Inhaler        Inhale 2 puffs into the lungs every 6 hours as needed for shortness of breath / dyspnea or wheezing   Refills:  0        * albuterol (2.5 MG/3ML) 0.083% neb solution   Dose:  2.5 mg   Quantity:  1 Box        Take 1 vial (2.5 mg) by nebulization every 4 hours as needed for shortness of breath / dyspnea   Refills:  0        azithromycin 250 MG tablet   Commonly known as:  ZITHROMAX Z-CATRACHO   Quantity:  6 tablet        Two tablets on the first day, then one tablet daily for the next 4 days   Refills:  0        predniSONE 20 MG tablet   Commonly known as:  DELTASONE   Quantity:  10 tablet        Take two tablets (= 40mg)  each day for 5 (five) days   Refills:  0        * Notice:  This list has 2 medication(s) that are the same as other medications prescribed for you. Read the directions carefully, and ask your doctor or other care provider to review them with you.      CONTINUE these medicines which may have CHANGED, or have new prescriptions. If we are uncertain of the size of tablets/capsules you have at home, strength may be listed as something that might have changed.        Dose / Directions Last dose taken    * EPINEPHrine 0.3 MG/0.3ML injection   Commonly known as:  EPIPEN   Dose:  0.3 mg   What changed:  Another medication with the same name was added. Make sure you understand how and when to take each.        Inject 0.3 mg into the muscle once as needed.   Refills:  0        * EPINEPHrine 0.3 MG/0.3ML injection 2-pack   Commonly known as:  EPIPEN/ADRENACLICK/or ANY BX GENERIC EQUIV   Dose:  0.3 mg   What changed:  You were already taking a medication with the same name, and this prescription was added. Make sure you understand how and when to take each.   Quantity:  0.6 mL        Inject 0.3 mLs (0.3 mg) into the muscle once as needed for anaphylaxis   Refills:  0        * Notice:  This list has 2 medication(s) that are the same as other medications prescribed for you. Read the directions carefully, and ask your doctor or other care provider to review them with you.      Our records show that you are taking the medicines listed below. If these are incorrect, please call your family doctor or clinic.        Dose / Directions Last dose taken    acetaminophen-caffeine 500-65 MG Tabs   Commonly known as:  EXCEDRIN TENSION HEADACHE   Dose:  3 tablet        Take 3 tablets by mouth every 6 hours as needed for mild pain   Refills:  0        albuterol-ipratropium  MCG/ACT Inhaler   Commonly known as:  COMBIVENT   Dose:  2 puff   Quantity:  1 Inhaler        Inhale 2 puffs into the lungs 4 times daily. Needs appointment   Refills:   11        ALEVE PO   Dose:  220 mg        Take 220 mg by mouth 2 times daily as needed for moderate pain   Refills:  0        amitriptyline 50 MG tablet   Commonly known as:  ELAVIL   Dose:  50 mg   Quantity:  30 tablet        Take 1 tablet (50 mg) by mouth At Bedtime   Refills:  11        BENADRYL cream   Dose:  50 mg   Generic drug:  diphenhydrAMINE-zinc acetate        Apply 50 mg topically as needed.   Refills:  0        cetirizine 10 MG tablet   Commonly known as:  zyrTEC   Dose:  10 mg   Quantity:  60 tablet        Take 1 tablet by mouth 2 times daily.   Refills:  11        fexofenadine 180 MG tablet   Commonly known as:  ALLEGRA   Dose:  180 mg   Quantity:  30 tablet        Take 1 tablet by mouth daily. Take upon awakening.   Refills:  11        MOBIC PO        Refills:  0        sildenafil 100 MG tablet   Commonly known as:  VIAGRA   Dose:   mg   Quantity:  6 tablet        Take 0.5-1 tablets ( mg) by mouth daily as needed for erectile dysfunction Take 30 min to 4 hours before intercourse.   Refills:  11        TRAMADOL HCL PO        Refills:  0                Information about OPIOIDS     PRESCRIPTION OPIOIDS: WHAT YOU NEED TO KNOW   We gave you an opioid (narcotic) pain medicine. It is important to manage your pain, but opioids are not always the best choice. You should first try all the other options your care team gave you. Take this medicine for as short a time (and as few doses) as possible.     These medicines have risks:    DO NOT drive when on new or higher doses of pain medicine. These medicines can affect your alertness and reaction times, and you could be arrested for driving under the influence (DUI). If you need to use opioids long-term, talk to your care team about driving.    DO NOT operate heave machinery    DO NOT do any other dangerous activities while taking these medicines.     DO NOT drink any alcohol while taking these medicines.      If the opioid prescribed includes  acetaminophen, DO NOT take with any other medicines that contain acetaminophen. Read all labels carefully. Look for the word  acetaminophen  or  Tylenol.  Ask your pharmacist if you have questions or are unsure.    You can get addicted to pain medicines, especially if you have a history of addiction (chemical, alcohol or substance dependence). Talk to your care team about ways to reduce this risk.    Store your pills in a secure place, locked if possible. We will not replace any lost or stolen medicine. If you don t finish your medicine, please throw away (dispose) as directed by your pharmacist. The [place] [employer]ution [employer] has more information about safe disposal: https://www.pca.[facility].mn.us/living-green/managing-unwanted-medications.     All opioids tend to cause constipation. Drink plenty of water and eat foods that have a lot of fiber, such as fruits, vegetables, prune juice, apple juice and high-fiber cereal. Take a laxative (Miralax, milk of magnesia, Colace, Senna) if you don t move your bowels at least every other day.         Prescriptions were sent or printed at these locations (5 Prescriptions)                   [facility] [address] [place] [address] - [address] [facility] [address] [facility] [address]   [address] [facility] [address]    Telephone:  [phone]   Fax:  [phone]   Hours:                  E-Prescribed (5 of 5)         albuterol (2.5 MG/3ML) 0.083% neb solution               albuterol (PROAIR HFA/PROVENTIL HFA/VENTOLIN HFA) 108 (90 Base) MCG/ACT Inhaler               azithromycin (ZITHROMAX Z-CATRACHO) 250 MG tablet               EPINEPHrine (EPIPEN/ADRENACLICK/OR ANY BX GENERIC EQUIV) 0.3 MG/0.3ML injection 2-pack               predniSONE (DELTASONE) 20 MG tablet                Procedures and tests performed during your visit     Chest XR,  PA & LAT      Orders Needing Specimen Collection     None      Pending Results     No orders found from 6/30/2018 to 7/3/2018.             Pending Culture Results     No orders found from 6/30/2018 to 7/3/2018.            Pending Results Instructions     If you had any lab results that were not finalized at the time of your Discharge, you can call the ED Lab Result RN at [phone]. You will be contacted by this team for any positive Lab results or changes in treatment. The nurses are available 7 days a week from 10A to 6:30P.  You can leave a message 24 hours per day and they will return your call.        Test Results From Your Hospital Stay        7/2/2018 12:04 PM      Narrative     CHEST TWO VIEWS   7/2/2018 11:41 AM     HISTORY: Cough.     COMPARISON: Chest x-ray 3/16/2015.        Impression     IMPRESSION: PA and lateral views of the chest. Lungs are clear. Heart  is normal in size. No effusions are evident. No pneumothorax.    KARUNA DUVALL MD                Thank you for choosing [facility]       Thank you for choosing [facility] for your care. Our goal is always to provide you with excellent care. Hearing back from our patients is one way we can continue to improve our services. Please take a few minutes to complete the written survey that you may receive in the mail after you visit with us. Thank you!        [employer] Information     [employer] gives you secure access to your electronic health record. If you see a primary care provider, you can also send messages to your care team and make appointments. If you have questions, please call your primary care clinic.  If you do not have a primary care provider, please call [phone] and they will assist you.        Care EveryWhere ID     This is your Care EveryWhere ID. This could be used by other organizations to access your [facility] medical records  [id number]        Equal Access to Services     CAROLINA WEBB : Yesenia Suarez, waaníbal lupoornima, qaybta katory rosales. [facility] [phone].    ATENCIÓN: esha Smith  sultana disposición servicios gratuitos de asistencia lingüística. Lldeshawn al [phone].    We comply with applicable federal civil rights laws and [place] laws. We do not discriminate on the basis of race, color, national origin, age, disability, sex, sexual orientation, or gender identity.            After Visit Summary       This is your record. Keep this with you and show to your community pharmacist(s) and doctor(s) at your next visit.

## 2018-09-04 ENCOUNTER — TRANSFERRED RECORDS (OUTPATIENT)
Dept: HEALTH INFORMATION MANAGEMENT | Facility: CLINIC | Age: 48
End: 2018-09-04

## 2018-09-19 ENCOUNTER — TRANSFERRED RECORDS (OUTPATIENT)
Dept: HEALTH INFORMATION MANAGEMENT | Facility: CLINIC | Age: 48
End: 2018-09-19

## 2018-10-16 ENCOUNTER — TRANSFERRED RECORDS (OUTPATIENT)
Dept: HEALTH INFORMATION MANAGEMENT | Facility: CLINIC | Age: 48
End: 2018-10-16

## 2019-01-21 ENCOUNTER — HOSPITAL ENCOUNTER (EMERGENCY)
Facility: CLINIC | Age: 49
Discharge: HOME | End: 2019-01-21
Attending: EMERGENCY MEDICINE | Admitting: EMERGENCY MEDICINE
Payer: COMMERCIAL

## 2019-01-21 VITALS
HEIGHT: 67 IN | SYSTOLIC BLOOD PRESSURE: 137 MMHG | TEMPERATURE: 98.3 F | RESPIRATION RATE: 16 BRPM | OXYGEN SATURATION: 97 % | BODY MASS INDEX: 26.31 KG/M2 | DIASTOLIC BLOOD PRESSURE: 90 MMHG

## 2019-01-21 DIAGNOSIS — R68.89 FLU-LIKE SYMPTOMS: ICD-10-CM

## 2019-01-21 DIAGNOSIS — R11.2 NON-INTRACTABLE VOMITING WITH NAUSEA, UNSPECIFIED VOMITING TYPE: ICD-10-CM

## 2019-01-21 DIAGNOSIS — R52 BODY ACHES: ICD-10-CM

## 2019-01-21 DIAGNOSIS — J02.9 SORE THROAT: ICD-10-CM

## 2019-01-21 LAB
ANION GAP SERPL CALCULATED.3IONS-SCNC: 7 MMOL/L (ref 3–14)
BASOPHILS # BLD AUTO: 0.1 10E9/L (ref 0–0.2)
BASOPHILS NFR BLD AUTO: 0.5 %
BUN SERPL-MCNC: 7 MG/DL (ref 7–30)
CALCIUM SERPL-MCNC: 8.5 MG/DL (ref 8.5–10.1)
CHLORIDE SERPL-SCNC: 104 MMOL/L (ref 94–109)
CO2 SERPL-SCNC: 27 MMOL/L (ref 20–32)
CREAT SERPL-MCNC: 0.8 MG/DL (ref 0.66–1.25)
DEPRECATED S PYO AG THROAT QL EIA: NORMAL
DIFFERENTIAL METHOD BLD: ABNORMAL
EOSINOPHIL # BLD AUTO: 0.1 10E9/L (ref 0–0.7)
EOSINOPHIL NFR BLD AUTO: 0.5 %
ERYTHROCYTE [DISTWIDTH] IN BLOOD BY AUTOMATED COUNT: 11.9 % (ref 10–15)
FLUAV+FLUBV AG SPEC QL: NEGATIVE
FLUAV+FLUBV AG SPEC QL: NEGATIVE
GFR SERPL CREATININE-BSD FRML MDRD: >90 ML/MIN/{1.73_M2}
GLUCOSE SERPL-MCNC: 91 MG/DL (ref 70–99)
HCT VFR BLD AUTO: 48.2 % (ref 40–53)
HGB BLD-MCNC: 16.5 G/DL (ref 13.3–17.7)
IMM GRANULOCYTES # BLD: 0.1 10E9/L (ref 0–0.4)
IMM GRANULOCYTES NFR BLD: 0.5 %
LYMPHOCYTES # BLD AUTO: 2.2 10E9/L (ref 0.8–5.3)
LYMPHOCYTES NFR BLD AUTO: 12.5 %
MCH RBC QN AUTO: 31.1 PG (ref 26.5–33)
MCHC RBC AUTO-ENTMCNC: 34.2 G/DL (ref 31.5–36.5)
MCV RBC AUTO: 91 FL (ref 78–100)
MONOCYTES # BLD AUTO: 1.2 10E9/L (ref 0–1.3)
MONOCYTES NFR BLD AUTO: 6.7 %
NEUTROPHILS # BLD AUTO: 14.1 10E9/L (ref 1.6–8.3)
NEUTROPHILS NFR BLD AUTO: 79.3 %
NRBC # BLD AUTO: 0 10*3/UL
NRBC BLD AUTO-RTO: 0 /100
PLATELET # BLD AUTO: 205 10E9/L (ref 150–450)
POTASSIUM SERPL-SCNC: 3.7 MMOL/L (ref 3.4–5.3)
RBC # BLD AUTO: 5.31 10E12/L (ref 4.4–5.9)
SODIUM SERPL-SCNC: 138 MMOL/L (ref 133–144)
SPECIMEN SOURCE: NORMAL
SPECIMEN SOURCE: NORMAL
WBC # BLD AUTO: 17.8 10E9/L (ref 4–11)

## 2019-01-21 PROCEDURE — 87804 INFLUENZA ASSAY W/OPTIC: CPT | Performed by: EMERGENCY MEDICINE

## 2019-01-21 PROCEDURE — 25000128 H RX IP 250 OP 636: Performed by: EMERGENCY MEDICINE

## 2019-01-21 PROCEDURE — 85025 COMPLETE CBC W/AUTO DIFF WBC: CPT | Performed by: EMERGENCY MEDICINE

## 2019-01-21 PROCEDURE — 96361 HYDRATE IV INFUSION ADD-ON: CPT

## 2019-01-21 PROCEDURE — 87081 CULTURE SCREEN ONLY: CPT | Performed by: EMERGENCY MEDICINE

## 2019-01-21 PROCEDURE — 80048 BASIC METABOLIC PNL TOTAL CA: CPT | Performed by: EMERGENCY MEDICINE

## 2019-01-21 PROCEDURE — 96374 THER/PROPH/DIAG INJ IV PUSH: CPT

## 2019-01-21 PROCEDURE — 96375 TX/PRO/DX INJ NEW DRUG ADDON: CPT

## 2019-01-21 PROCEDURE — 99284 EMERGENCY DEPT VISIT MOD MDM: CPT | Mod: Z6 | Performed by: EMERGENCY MEDICINE

## 2019-01-21 PROCEDURE — 87880 STREP A ASSAY W/OPTIC: CPT | Performed by: EMERGENCY MEDICINE

## 2019-01-21 PROCEDURE — 99284 EMERGENCY DEPT VISIT MOD MDM: CPT | Mod: 25

## 2019-01-21 RX ORDER — ONDANSETRON 2 MG/ML
4 INJECTION INTRAMUSCULAR; INTRAVENOUS EVERY 30 MIN PRN
Status: DISCONTINUED | OUTPATIENT
Start: 2019-01-21 | End: 2019-01-21 | Stop reason: HOSPADM

## 2019-01-21 RX ORDER — ONDANSETRON 4 MG/1
4 TABLET, ORALLY DISINTEGRATING ORAL EVERY 8 HOURS PRN
Qty: 10 TABLET | Refills: 0 | Status: SHIPPED | OUTPATIENT
Start: 2019-01-21 | End: 2019-03-20

## 2019-01-21 RX ORDER — KETOROLAC TROMETHAMINE 15 MG/ML
15 INJECTION, SOLUTION INTRAMUSCULAR; INTRAVENOUS ONCE
Status: COMPLETED | OUTPATIENT
Start: 2019-01-21 | End: 2019-01-21

## 2019-01-21 RX ORDER — SODIUM CHLORIDE 9 MG/ML
1000 INJECTION, SOLUTION INTRAVENOUS CONTINUOUS
Status: DISCONTINUED | OUTPATIENT
Start: 2019-01-21 | End: 2019-01-21 | Stop reason: HOSPADM

## 2019-01-21 RX ADMIN — KETOROLAC TROMETHAMINE 15 MG: 15 INJECTION, SOLUTION INTRAMUSCULAR; INTRAVENOUS at 11:55

## 2019-01-21 RX ADMIN — ONDANSETRON 4 MG: 2 INJECTION INTRAMUSCULAR; INTRAVENOUS at 11:39

## 2019-01-21 RX ADMIN — SODIUM CHLORIDE 1000 ML: 9 INJECTION, SOLUTION INTRAVENOUS at 11:39

## 2019-01-21 ASSESSMENT — ENCOUNTER SYMPTOMS
MYALGIAS: 1
ABDOMINAL PAIN: 0
SHORTNESS OF BREATH: 0
FEVER: 0

## 2019-01-21 NOTE — DISCHARGE INSTRUCTIONS
Continue tylenol and ibuprofen.    Alternate these medications every three hours as needed for fever.  (For example, 1000mg tylenol at 8am, 600mg ibuprofen at 11am, tylenol at 2pm, ibuprofen at 5pm, tylenol at 8pm...)    Zofran as needed for nausea/vomiting.     Benadryl can be taken for nasal congestion.  You can also take Sudafed.

## 2019-01-21 NOTE — ED AVS SNAPSHOT
[facility] Emergency Department  [address] [facility] [address]  Phone:  [phone]  Fax:  [phone]                                    Dann Castillo   MRN: [id number]    Department:  [facility] Emergency Department   Date of Visit:  1/21/2019           After Visit Summary Signature Page    I have received my discharge instructions, and my questions have been answered. I have discussed any challenges I see with this plan with the nurse or doctor.    ..........................................................................................................................................  Patient/Patient Representative Signature      ..........................................................................................................................................  Patient Representative Print Name and Relationship to Patient    ..................................................               ................................................  Date                                   Time    ..........................................................................................................................................  Reviewed by Signature/Title    ...................................................              ..............................................  Date                                               Time          22EPIC Rev 08/18

## 2019-01-21 NOTE — ED NOTES
"Patient states \"I think I have the flu.\" symptoms started 4 days ago. Nausea, vomiting, diarrhea, temp up to 103, headache and sore throat. Patient took tylenol and dayquil for comfort today at 8:00. Co-workers have been sick.   "

## 2019-01-21 NOTE — ED TRIAGE NOTES
Pt here for sore throat, fever and vomiting. Body aches, head is pounding. Pt wearing surgical mask, for cough. Instructed on wearing mask around patients visitors and staff.

## 2019-01-21 NOTE — ED PROVIDER NOTES
History     Chief Complaint   Patient presents with     Pharyngitis     with fever     Fever     with body aches     Nausea & Vomiting     HPI  Ed Jonathan is a 48 year old male who has past medical history significant for depression, and no other major medical issues, presenting to the emergency department with approximately 4-day history of flulike symptoms.  Patient states that last Wednesday, 5 days ago, he was exposed to ill contacts at work.  Beginning on Thursday, 4 days ago, patient has had nausea, vomiting, diarrhea, fever up to 102-103 degrees, with generalized headache, generalized muscle aches, in addition a sore throat.  Patient has been taking Tylenol, DayQuil without much alleviation of symptoms.  Denies any recent traveling.  Slight cough has been present, nonproductive in nature.  Denies any chest pain.  No significant abdominal pains.  No rashes.  No neck stiffness.  No weakness of the extremities.  No earache.    Allergies:  Allergies   Allergen Reactions     Peanuts [Nuts] Hives     And the oil       Problem List:    Patient Active Problem List    Diagnosis Date Noted     Postconcussion syndrome 03/25/2016     Priority: Medium     Saw Dr Morillo at [facility] on 3/22/16 - MRI brain and neck, starting elavil 25, vestibular therapy, ongoing Physical Therapy, recheck 4-6 wks.  Note sent to scanning.       Shoulder joint pain, unspecified laterality 02/16/2016     Priority: Medium     Saw [facility] 1/21/16 - impingement syndrome and adhesive capsulitis both shoulders, recommended re-trying cortisone shots.         CARDIOVASCULAR SCREENING; LDL GOAL LESS THAN 160 10/31/2010     Priority: Medium     Mild major depression (H) 08/03/2007     Priority: Medium        Past Medical History:    History reviewed. No pertinent past medical history.    Past Surgical History:    Past Surgical History:   Procedure Laterality Date     AMPUTATE FINGER(S) Left 2/2/2018    Procedure: AMPUTATE FINGER(S);   Left long finger revision amputation.;  Surgeon: Denise Lyle MD;  Location: [facility]     ARTHROTOMY SHOULDER, ROTATOR CUFF REPAIR, COMBINED Right 2016    Procedure: COMBINED ARTHROTOMY SHOULDER, ROTATOR CUFF REPAIR;  Surgeon: Rafael Pacheco MD;  Location: [facility]     ARTHROTOMY SHOULDER, SUBACROMIAL DECOMPRESSION, COMBINED Left 2016    Procedure: COMBINED ARTHROTOMY SHOULDER, SUBACROMIAL DECOMPRESSION;  Surgeon: Rafael Pacheco MD;  Location: [facility]     DESTRUCTION OF PARAVERTEBRAL FACETCERVICAL / THORACIC SINGLE Right 2017    Procedure: DESTRUCTION OF PARAVERTEBRAL FACET CERVICAL / THORACIC SINGLE;  Right Radiofrequency Ablation of the Cervical 3rd occipital nerve, C3. 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      HC FULL THICK GRAFT HEAD/AXIL/GEN/HND/FT <=20 CM  2003    Full thickness skin graft to the donor site of the cross finger flap      SURGICAL HISTORY OF -   2003    Division of left cross finger flap     SURGICAL HISTORY OF -       left ACL repair and partial menisectomy       Family History:    Family History   Problem Relation Age of Onset     Heart Disease Mother      Diabetes Father      Cerebrovascular Disease Father      Depression Sister      Obesity Sister      Obesity Sister        Social History:  Marital Status:   [2]  Social History     Tobacco Use     Smoking status: Former Smoker     Packs/day: 1.50     Years: 17.00     Pack years: 25.50     Last attempt to quit: 2005     Years since quittin.5     Smokeless tobacco: Never Used     Tobacco comment: around second hand smoke daily   Substance Use Topics     Alcohol use: Yes     Comment: occ     Drug use: No        Medications:      Acetaminophen (TYLENOL 8 HOUR ARTHRITIS PAIN PO)   acetaminophen-caffeine (EXCEDRIN  "TENSION HEADACHE) 500-65 MG TABS   albuterol (PROAIR HFA/PROVENTIL HFA/VENTOLIN HFA) 108 (90 Base) MCG/ACT Inhaler   cetirizine (ZYRTEC) 10 MG tablet   Ibuprofen-Diphenhydramine Cit (IBUPROFEN PM) 200-38 MG TABS   ondansetron (ZOFRAN ODT) 4 MG ODT tab   diphenhydrAMINE-zinc acetate (BENADRYL) cream   EPINEPHrine (EPIPEN/ADRENACLICK/OR ANY BX GENERIC EQUIV) 0.3 MG/0.3ML injection 2-pack   fexofenadine (ALLEGRA) 180 MG tablet         Review of Systems   Constitutional: Negative for fever.   Respiratory: Negative for shortness of breath.    Cardiovascular: Negative for chest pain.   Gastrointestinal: Negative for abdominal pain.   Musculoskeletal: Positive for myalgias.   All other systems reviewed and are negative.      Physical Exam   BP: 137/90  Heart Rate: 106  Temp: 98.3  F (36.8  C)  Resp: 16  Height: 170.2 cm (5' 7\")  SpO2: 97 %      Physical Exam  /90   Temp 98.3  F (36.8  C) (Oral)   Resp 16   Ht 1.702 m (5' 7\")   SpO2 97%   BMI 26.31 kg/m    General: alert, interactive, nontoxic-appearing, however ill  Head: atraumatic  Nose: no rhinorrhea or epistaxis  Ears: no external auditory canal discharge or bleeding.    Eyes: Sclera nonicteric. Conjunctiva noninjected. PERRL, EOMI  Mouth: no tonsillar erythema, edema, or exudate  Neck: supple, with shotty anterior cervical lymphadenopathy  Lungs: CTAB  CV: RRR, S1/S2; peripheral pulses palpable and symmetric  Abdomen: soft, nt, nd, no guarding or rebound. Positive bowel sounds  Extremities: no cyanosis or edema  Skin: no rash or diaphoresis  Neuro:  strength 5/5 in UE and LEs bilaterally, sensation intact to light touch in UE and LEs bilaterally;       ED Course        Procedures               Critical Care time:  none               Results for orders placed or performed during the hospital encounter of 01/21/19 (from the past 24 hour(s))   CBC with platelets differential   Result Value Ref Range    WBC 17.8 (H) 4.0 - 11.0 10e9/L    RBC Count 5.31 4.4 - 5.9 " 10e12/L    Hemoglobin 16.5 13.3 - 17.7 g/dL    Hematocrit 48.2 40.0 - 53.0 %    MCV 91 78 - 100 fl    MCH 31.1 26.5 - 33.0 pg    MCHC 34.2 31.5 - 36.5 g/dL    RDW 11.9 10.0 - 15.0 %    Platelet Count 205 150 - 450 10e9/L    Diff Method Automated Method     % Neutrophils 79.3 %    % Lymphocytes 12.5 %    % Monocytes 6.7 %    % Eosinophils 0.5 %    % Basophils 0.5 %    % Immature Granulocytes 0.5 %    Nucleated RBCs 0 0 /100    Absolute Neutrophil 14.1 (H) 1.6 - 8.3 10e9/L    Absolute Lymphocytes 2.2 0.8 - 5.3 10e9/L    Absolute Monocytes 1.2 0.0 - 1.3 10e9/L    Absolute Eosinophils 0.1 0.0 - 0.7 10e9/L    Absolute Basophils 0.1 0.0 - 0.2 10e9/L    Abs Immature Granulocytes 0.1 0 - 0.4 10e9/L    Absolute Nucleated RBC 0.0    Basic metabolic panel   Result Value Ref Range    Sodium 138 133 - 144 mmol/L    Potassium 3.7 3.4 - 5.3 mmol/L    Chloride 104 94 - 109 mmol/L    Carbon Dioxide 27 20 - 32 mmol/L    Anion Gap 7 3 - 14 mmol/L    Glucose 91 70 - 99 mg/dL    Urea Nitrogen 7 7 - 30 mg/dL    Creatinine 0.80 0.66 - 1.25 mg/dL    GFR Estimate >90 >60 mL/min/[1.73_m2]    GFR Estimate If Black >90 >60 mL/min/[1.73_m2]    Calcium 8.5 8.5 - 10.1 mg/dL   Rapid Strep Screen   Result Value Ref Range    Specimen Description Throat     Rapid Strep A Screen       NEGATIVE: No Group A streptococcal antigen detected by immunoassay, await culture report.   Influenza A/B antigen   Result Value Ref Range    Influenza A/B Agn Specimen Nasopharyngeal     Influenza A Negative NEG^Negative    Influenza B Negative NEG^Negative       Medications   ondansetron (ZOFRAN) injection 4 mg (4 mg Intravenous Given 1/21/19 1139)   0.9% sodium chloride BOLUS (0 mLs Intravenous Stopped 1/21/19 1258)     Followed by   sodium chloride 0.9% infusion (not administered)   ketorolac (TORADOL) injection 15 mg (15 mg Intravenous Given 1/21/19 6690)       Assessments & Plan (with Medical Decision Making)  48 year old male, otherwise healthy, presenting to the  emergency department with approximately 4-day history of symptoms of congestion, clear rhinorrhea, generalized headache, myalgias, muscle aches, in addition to nausea, vomiting, and diarrhea.  IV established, labs drawn.  Patient noted to have slightly elevated white blood cell count, nonspecific, and likely secondary to demargination.  He has benign abdominal exam.  Lungs are clear to auscultation, and do not feel that pneumonia is likely.  No signs of meningismus.  Rapid strep is performed and this is negative.  This was performed based primarily on patient's complaints of severe sore throat, and him wanting to rule out strep throat.  His symptoms and constellation of symptoms are more consistent with flulike illness.  Influenza swab is performed and this is negative.  Patient encouraged to follow-up with primary care provider as needed.  He is instructed on Tylenol, ibuprofen, and prescription for Zofran was sent with patient.  Patient with multiple ill contacts with similar types of symptoms, and therefore given the entirety of his symptoms I feel this is likely viral.  Abdominal exam is benign.  Patient encouraged to return if severe worsening of symptoms.     I have reviewed the nursing notes.    I have reviewed the findings, diagnosis, plan and need for follow up with the patient.          Medication List      Started    ondansetron 4 MG ODT tab  Commonly known as:  ZOFRAN ODT  4 mg, Oral, EVERY 8 HOURS PRN            Final diagnoses:   Flu-like symptoms   Non-intractable vomiting with nausea, unspecified vomiting type   Body aches   Sore throat       1/21/2019   [facility] EMERGENCY DEPARTMENT     Clarence Lambert MD  01/21/19 9370

## 2019-01-23 LAB
BACTERIA SPEC CULT: NORMAL
Lab: NORMAL
SPECIMEN SOURCE: NORMAL

## 2019-01-23 NOTE — RESULT ENCOUNTER NOTE
Final Beta strep group A r/o culture is NEGATIVE for Group A streptococcus.    No treatment or change in treatment per [facility] Strep protocol.

## 2019-03-19 ENCOUNTER — TELEPHONE (OUTPATIENT)
Dept: FAMILY MEDICINE | Facility: CLINIC | Age: 49
End: 2019-03-19

## 2019-03-19 NOTE — TELEPHONE ENCOUNTER
S-(situation): Ed states that he has pain in his left testicle. Pain started 4 days.  Pain comes and goes, but worse now, more constant.  No injury.  Not swollen he says.  Walking makes it worse.  Doing nothing makes it better    B-(background): N/A    A-(assessment): scrotal pain    R-(recommendations): advised needs to be seen.  Keiry Nunes RN

## 2019-03-19 NOTE — TELEPHONE ENCOUNTER
Reason for Call:  Other     Detailed comments: Please call patient he is having pain off and on in his private area.    Phone Number Patient can be reached at: Home number on file [phone] (home)    Best Time:     Can we leave a detailed message on this number? YES    Call taken on 3/19/2019 at 11:11 AM by Yue Rodríguez

## 2019-03-20 ENCOUNTER — OFFICE VISIT (OUTPATIENT)
Dept: FAMILY MEDICINE | Facility: CLINIC | Age: 49
End: 2019-03-20
Payer: COMMERCIAL

## 2019-03-20 VITALS
BODY MASS INDEX: 26.84 KG/M2 | SYSTOLIC BLOOD PRESSURE: 130 MMHG | DIASTOLIC BLOOD PRESSURE: 88 MMHG | HEIGHT: 67 IN | HEART RATE: 84 BPM | RESPIRATION RATE: 18 BRPM | TEMPERATURE: 98.7 F | WEIGHT: 171 LBS

## 2019-03-20 DIAGNOSIS — N50.812 TESTICULAR PAIN, LEFT: ICD-10-CM

## 2019-03-20 DIAGNOSIS — N45.3 EPIDIDYMOORCHITIS: Primary | ICD-10-CM

## 2019-03-20 LAB
ALBUMIN UR-MCNC: NEGATIVE MG/DL
AMORPH CRY #/AREA URNS HPF: ABNORMAL /HPF
APPEARANCE UR: ABNORMAL
BILIRUB UR QL STRIP: NEGATIVE
COLOR UR AUTO: YELLOW
GLUCOSE UR STRIP-MCNC: NEGATIVE MG/DL
HGB UR QL STRIP: NEGATIVE
KETONES UR STRIP-MCNC: NEGATIVE MG/DL
LEUKOCYTE ESTERASE UR QL STRIP: NEGATIVE
NITRATE UR QL: NEGATIVE
PH UR STRIP: 7.5 PH (ref 5–7)
RBC #/AREA URNS AUTO: ABNORMAL /HPF
SOURCE: ABNORMAL
SP GR UR STRIP: 1.01 (ref 1–1.03)
UROBILINOGEN UR STRIP-ACNC: 0.2 EU/DL (ref 0.2–1)
WBC #/AREA URNS AUTO: ABNORMAL /HPF

## 2019-03-20 PROCEDURE — 81001 URINALYSIS AUTO W/SCOPE: CPT | Performed by: NURSE PRACTITIONER

## 2019-03-20 PROCEDURE — 99213 OFFICE O/P EST LOW 20 MIN: CPT | Performed by: NURSE PRACTITIONER

## 2019-03-20 RX ORDER — LEVOFLOXACIN 500 MG/1
500 TABLET, FILM COATED ORAL DAILY
Qty: 10 TABLET | Refills: 0 | Status: SHIPPED | OUTPATIENT
Start: 2019-03-20 | End: 2019-04-30

## 2019-03-20 ASSESSMENT — MIFFLIN-ST. JEOR: SCORE: 1614.28

## 2019-03-20 NOTE — PATIENT INSTRUCTIONS
Take Levofloxacin daily for 10 days    Take the Aleve to decrease inflammation    Follow up if symptoms do not improve or worsen.    Patient Education     Discharge Instructions for Epididymitis  You have been diagnosed with epididymitis. This is an inflammation of a coiled tube called the epididymis that is located behind your testicle, inside the scrotum. The epididymis collects and stores sperm made by the testicles. Epididymitis is often caused by bacteria in the urinary tract or by bacteria passed between partners during sex. It can also be a complication of certain hospital procedures, or it can be caused by use of a urinary catheter. Here s what you need to do at home to care for yourself.  Home care    Be sure to finish all the medicine your healthcare provider prescribed -- even if you feel better. Not finishing the medicine can make your condition harder to treat or cause the condition to come back.    Rest in bed if you are uncomfortable. Discomfort should go away within 1 to 3 days of treatment. Swelling may persist for up to 2 months.    Ask your healthcare provider about pain medicine to keep you comfortable.    Use an ice pack or bag of frozen peas to help relieve the pain. Wrap the peas or ice pack in a thin cloth and apply to the area.    Don t leave the ice pack on your skin for longer than 20 minutes, and don t use it more often than once every hour.    Elevate the scrotum with a rolled-up towel when you are resting.    For the first few days, wear an athletic supporter. When your pain subsides, wear briefs instead of boxers to better support the scrotum. This can help relieve pain.    Keep your penis and scrotum clean.    Use a condom to protect against sexually transmitted diseases (STDs).    If your condition was caused by an STD, be sure to inform your sexual partner(s).  Follow-up care  Make a follow-up appointment or as directed by your healthcare provider.  When to call your healthcare  provider  Call your healthcare provider right away if you have any of the following:    Increased pain or swelling in the scrotum    Frequent urge or inability to urinate    Discharge from the penis    Pain during ejaculation    Fever above 100.4 F (38 C)   Date Last Reviewed: 1/1/2017 2000-2018 [employer]. [address] [place], [place] [address]. All rights reserved. This information is not intended as a substitute for professional medical care. Always follow your healthcare professional's instructions.

## 2019-03-20 NOTE — PROGRESS NOTES
SUBJECTIVE:   Dann Castillo is a 48 year old male who presents to clinic today for the following health issues:      Testicular Pain       Duration: 5 days     Description (location/character/radiation): pain in left testicle     Intensity:  moderate    Accompanying signs and symptoms: pain - pain radiates down left leg     History (similar episodes/previous evaluation): Father had prostate cancer.     Precipitating or alleviating factors: None    Therapies tried and outcome: Aleve has helped with pain      No concerns for STI    Problem list and histories reviewed & adjusted, as indicated.  Additional history: as documented    Patient Active Problem List   Diagnosis     Mild major depression (H)     CARDIOVASCULAR SCREENING; LDL GOAL LESS THAN 160     Shoulder joint pain, unspecified laterality     Postconcussion syndrome     Past Surgical History:   Procedure Laterality Date     AMPUTATE FINGER(S) Left 2/2/2018    Procedure: AMPUTATE FINGER(S);  Left long finger revision amputation.;  Surgeon: Denise Lyle MD;  Location: [facility]     ARTHROTOMY SHOULDER, ROTATOR CUFF REPAIR, COMBINED Right 5/2/2016    Procedure: COMBINED ARTHROTOMY SHOULDER, ROTATOR CUFF REPAIR;  Surgeon: Rafael Pacheco MD;  Location: [facility]     ARTHROTOMY SHOULDER, SUBACROMIAL DECOMPRESSION, COMBINED Left 6/13/2016    Procedure: COMBINED ARTHROTOMY SHOULDER, SUBACROMIAL DECOMPRESSION;  Surgeon: Rafael Pacheco MD;  Location: [facility]     DESTRUCTION OF PARAVERTEBRAL FACETCERVICAL / THORACIC SINGLE Right 8/25/2017    Procedure: DESTRUCTION OF PARAVERTEBRAL FACET CERVICAL / THORACIC SINGLE;  Right Radiofrequency Ablation of the Cervical 3rd occipital nerve, C3. 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      HC FULL THICK GRAFT HEAD/AXIL/GEN/HND/FT <=20 CM  8/31/2003     Full thickness skin graft to the donor site of the cross finger flap      SURGICAL HISTORY OF -   2003    Division of left cross finger flap     SURGICAL HISTORY OF -       left ACL repair and partial menisectomy       Social History     Tobacco Use     Smoking status: Former Smoker     Packs/day: 1.50     Years: 17.00     Pack years: 25.50     Last attempt to quit: 2005     Years since quittin.7     Smokeless tobacco: Never Used     Tobacco comment: around second hand smoke daily   Substance Use Topics     Alcohol use: Yes     Comment: occ     Family History   Problem Relation Age of Onset     Heart Disease Mother      Diabetes Father      Cerebrovascular Disease Father      Depression Sister      Obesity Sister      Obesity Sister          Current Outpatient Medications   Medication Sig Dispense Refill     Acetaminophen (TYLENOL 8 HOUR ARTHRITIS PAIN PO) Take 1,300 mg by mouth 2 times daily       acetaminophen-caffeine (EXCEDRIN TENSION HEADACHE) 500-65 MG TABS Take 3 tablets by mouth every 6 hours as needed for mild pain       albuterol (PROAIR HFA/PROVENTIL HFA/VENTOLIN HFA) 108 (90 Base) MCG/ACT Inhaler Inhale 2 puffs into the lungs every 6 hours as needed for shortness of breath / dyspnea or wheezing 1 Inhaler 0     cetirizine (ZYRTEC) 10 MG tablet Take 1 tablet by mouth 2 times daily. 60 tablet 11     diphenhydrAMINE-zinc acetate (BENADRYL) cream Apply 50 mg topically as needed.       EPINEPHrine (EPIPEN/ADRENACLICK/OR ANY BX GENERIC EQUIV) 0.3 MG/0.3ML injection 2-pack Inject 0.3 mLs (0.3 mg) into the muscle once as needed for anaphylaxis 0.6 mL 0     fexofenadine (ALLEGRA) 180 MG tablet Take 1 tablet by mouth daily. Take upon awakening. 30 tablet 11     Ibuprofen-Diphenhydramine Cit (IBUPROFEN PM) 200-38 MG TABS Take 1 tablet by mouth At Bedtime       levofloxacin (LEVAQUIN) 500 MG tablet Take 1 tablet (500 mg) by mouth daily for 10 days 10 tablet 0     Allergies   Allergen Reactions  "    Peanuts [Nuts] Hives     And the oil     Labs reviewed in EPIC    Reviewed and updated as needed this visit by clinical staff       Reviewed and updated as needed this visit by Provider         ROS:  Constitutional, HEENT, cardiovascular, pulmonary, gi and gu systems are negative, except as otherwise noted.    OBJECTIVE:     /88 (Cuff Size: Adult Regular)   Pulse 84   Temp 98.7  F (37.1  C) (Tympanic)   Resp 18   Ht 1.702 m (5' 7\")   Wt 77.6 kg (171 lb)   BMI 26.78 kg/m    Body mass index is 26.78 kg/m .  GENERAL: healthy, alert and no distress   (male): testicles normal without atrophy or masses, no hernias, penis normal without urethral discharge and tenderness to palpation left anterior testicle  NEURO: Normal strength and tone, mentation intact and speech normal  PSYCH: mentation appears normal, affect normal/bright    Diagnostic Test Results:  Results for orders placed or performed in visit on 03/20/19   *UA reflex to Microscopic and Culture ([place] [facility]] (except [place] and [place])   Result Value Ref Range    Color Urine Yellow     Appearance Urine Cloudy     Glucose Urine Negative NEG^Negative mg/dL    Bilirubin Urine Negative NEG^Negative    Ketones Urine Negative NEG^Negative mg/dL    Specific Gravity Urine 1.015 1.003 - 1.035    Blood Urine Negative NEG^Negative    pH Urine 7.5 (H) 5.0 - 7.0 pH    Protein Albumin Urine Negative NEG^Negative mg/dL    Urobilinogen Urine 0.2 0.2 - 1.0 EU/dL    Nitrite Urine Negative NEG^Negative    Leukocyte Esterase Urine Negative NEG^Negative    Source Midstream Urine    Urine Microscopic   Result Value Ref Range    WBC Urine 0 - 5 OTO5^0 - 5 /HPF    RBC Urine O - 2 OTO2^O - 2 /HPF    Amorphous Crystals Moderate (A) NEG^Negative /HPF       ASSESSMENT/PLAN:     1. Epididymoorchitis  With symptoms will treat with levofloxacin.  Risks and benefits of medication discussed. Ed verbalized understanding of risks. Symptomatic care and follow up " discussed.  - levofloxacin (LEVAQUIN) 500 MG tablet; Take 1 tablet (500 mg) by mouth daily for 10 days  Dispense: 10 tablet; Refill: 0    2. Testicular pain, left    - *UA reflex to Microscopic and Culture ([facility] (except [place] and [place])  - Urine Microscopic    Home care instructions were reviewed with the patient. The risks, benefits and treatment options of prescribed medications or other treatments have been discussed with the patient. The patient verbalized their understanding and should call or follow up if no improvement or if they develop further problems.    Patient Instructions     Take Levofloxacin daily for 10 days    Take the Aleve to decrease inflammation    Follow up if symptoms do not improve or worsen.    Patient Education     Discharge Instructions for Epididymitis  You have been diagnosed with epididymitis. This is an inflammation of a coiled tube called the epididymis that is located behind your testicle, inside the scrotum. The epididymis collects and stores sperm made by the testicles. Epididymitis is often caused by bacteria in the urinary tract or by bacteria passed between partners during sex. It can also be a complication of certain hospital procedures, or it can be caused by use of a urinary catheter. Here s what you need to do at home to care for yourself.  Home care    Be sure to finish all the medicine your healthcare provider prescribed -- even if you feel better. Not finishing the medicine can make your condition harder to treat or cause the condition to come back.    Rest in bed if you are uncomfortable. Discomfort should go away within 1 to 3 days of treatment. Swelling may persist for up to 2 months.    Ask your healthcare provider about pain medicine to keep you comfortable.    Use an ice pack or bag of frozen peas to help relieve the pain. Wrap the peas or ice pack in a thin cloth and apply to the area.    Don t leave the ice pack on your skin for  longer than 20 minutes, and don t use it more often than once every hour.    Elevate the scrotum with a rolled-up towel when you are resting.    For the first few days, wear an athletic supporter. When your pain subsides, wear briefs instead of boxers to better support the scrotum. This can help relieve pain.    Keep your penis and scrotum clean.    Use a condom to protect against sexually transmitted diseases (STDs).    If your condition was caused by an STD, be sure to inform your sexual partner(s).  Follow-up care  Make a follow-up appointment or as directed by your healthcare provider.  When to call your healthcare provider  Call your healthcare provider right away if you have any of the following:    Increased pain or swelling in the scrotum    Frequent urge or inability to urinate    Discharge from the penis    Pain during ejaculation    Fever above 100.4 F (38 C)   Date Last Reviewed: 1/1/2017 2000-2018 [employer]. [address] [place] [address], [place] [address]. All rights reserved. This information is not intended as a substitute for professional medical care. Always follow your healthcare professional's instructions.               OLGA [facility]

## 2019-04-13 ENCOUNTER — HOSPITAL ENCOUNTER (EMERGENCY)
Facility: CLINIC | Age: 49
Discharge: HOME | End: 2019-04-13
Attending: EMERGENCY MEDICINE | Admitting: EMERGENCY MEDICINE
Payer: COMMERCIAL

## 2019-04-13 ENCOUNTER — APPOINTMENT (OUTPATIENT)
Dept: ULTRASOUND IMAGING | Facility: CLINIC | Age: 49
End: 2019-04-13
Attending: EMERGENCY MEDICINE
Payer: COMMERCIAL

## 2019-04-13 VITALS
BODY MASS INDEX: 26.84 KG/M2 | DIASTOLIC BLOOD PRESSURE: 80 MMHG | TEMPERATURE: 97.9 F | RESPIRATION RATE: 16 BRPM | HEIGHT: 67 IN | WEIGHT: 171 LBS | HEART RATE: 77 BPM | OXYGEN SATURATION: 94 % | SYSTOLIC BLOOD PRESSURE: 128 MMHG

## 2019-04-13 DIAGNOSIS — N50.812 LEFT TESTICULAR PAIN: ICD-10-CM

## 2019-04-13 LAB
ALBUMIN UR-MCNC: NEGATIVE MG/DL
AMORPH CRY #/AREA URNS HPF: ABNORMAL /HPF
ANION GAP SERPL CALCULATED.3IONS-SCNC: 5 MMOL/L (ref 3–14)
APPEARANCE UR: ABNORMAL
BACTERIA #/AREA URNS HPF: ABNORMAL /HPF
BASOPHILS # BLD AUTO: 0.1 10E9/L (ref 0–0.2)
BASOPHILS NFR BLD AUTO: 0.6 %
BILIRUB UR QL STRIP: NEGATIVE
BUN SERPL-MCNC: 14 MG/DL (ref 7–30)
CALCIUM SERPL-MCNC: 8.6 MG/DL (ref 8.5–10.1)
CHLORIDE SERPL-SCNC: 109 MMOL/L (ref 94–109)
CO2 SERPL-SCNC: 28 MMOL/L (ref 20–32)
COLOR UR AUTO: YELLOW
CREAT SERPL-MCNC: 0.93 MG/DL (ref 0.66–1.25)
DIFFERENTIAL METHOD BLD: NORMAL
EOSINOPHIL # BLD AUTO: 0.3 10E9/L (ref 0–0.7)
EOSINOPHIL NFR BLD AUTO: 3.4 %
ERYTHROCYTE [DISTWIDTH] IN BLOOD BY AUTOMATED COUNT: 11.8 % (ref 10–15)
GFR SERPL CREATININE-BSD FRML MDRD: >90 ML/MIN/{1.73_M2}
GLUCOSE SERPL-MCNC: 110 MG/DL (ref 70–99)
GLUCOSE UR STRIP-MCNC: NEGATIVE MG/DL
HCT VFR BLD AUTO: 45.6 % (ref 40–53)
HGB BLD-MCNC: 15.2 G/DL (ref 13.3–17.7)
HGB UR QL STRIP: NEGATIVE
IMM GRANULOCYTES # BLD: 0 10E9/L (ref 0–0.4)
IMM GRANULOCYTES NFR BLD: 0.2 %
KETONES UR STRIP-MCNC: NEGATIVE MG/DL
LEUKOCYTE ESTERASE UR QL STRIP: NEGATIVE
LYMPHOCYTES # BLD AUTO: 4.2 10E9/L (ref 0.8–5.3)
LYMPHOCYTES NFR BLD AUTO: 46.6 %
MCH RBC QN AUTO: 29.7 PG (ref 26.5–33)
MCHC RBC AUTO-ENTMCNC: 33.3 G/DL (ref 31.5–36.5)
MCV RBC AUTO: 89 FL (ref 78–100)
MONOCYTES # BLD AUTO: 0.9 10E9/L (ref 0–1.3)
MONOCYTES NFR BLD AUTO: 9.8 %
NEUTROPHILS # BLD AUTO: 3.6 10E9/L (ref 1.6–8.3)
NEUTROPHILS NFR BLD AUTO: 39.4 %
NITRATE UR QL: NEGATIVE
NRBC # BLD AUTO: 0 10*3/UL
NRBC BLD AUTO-RTO: 0 /100
PH UR STRIP: 7 PH (ref 5–7)
PLATELET # BLD AUTO: 178 10E9/L (ref 150–450)
POTASSIUM SERPL-SCNC: 3.8 MMOL/L (ref 3.4–5.3)
RBC # BLD AUTO: 5.12 10E12/L (ref 4.4–5.9)
RBC #/AREA URNS AUTO: 1 /HPF (ref 0–2)
SODIUM SERPL-SCNC: 142 MMOL/L (ref 133–144)
SOURCE: ABNORMAL
SP GR UR STRIP: 1.02 (ref 1–1.03)
UROBILINOGEN UR STRIP-MCNC: 2 MG/DL (ref 0–2)
WBC # BLD AUTO: 9.1 10E9/L (ref 4–11)
WBC #/AREA URNS AUTO: 2 /HPF (ref 0–5)

## 2019-04-13 PROCEDURE — 99284 EMERGENCY DEPT VISIT MOD MDM: CPT | Mod: 25 | Performed by: EMERGENCY MEDICINE

## 2019-04-13 PROCEDURE — 99284 EMERGENCY DEPT VISIT MOD MDM: CPT | Mod: Z6 | Performed by: EMERGENCY MEDICINE

## 2019-04-13 PROCEDURE — 93976 VASCULAR STUDY: CPT

## 2019-04-13 PROCEDURE — 36415 COLL VENOUS BLD VENIPUNCTURE: CPT | Performed by: EMERGENCY MEDICINE

## 2019-04-13 PROCEDURE — 81001 URINALYSIS AUTO W/SCOPE: CPT | Performed by: EMERGENCY MEDICINE

## 2019-04-13 PROCEDURE — 80048 BASIC METABOLIC PNL TOTAL CA: CPT | Performed by: EMERGENCY MEDICINE

## 2019-04-13 PROCEDURE — 85025 COMPLETE CBC W/AUTO DIFF WBC: CPT | Performed by: EMERGENCY MEDICINE

## 2019-04-13 PROCEDURE — 25000132 ZZH RX MED GY IP 250 OP 250 PS 637: Performed by: EMERGENCY MEDICINE

## 2019-04-13 RX ORDER — HYDROCODONE BITARTRATE AND ACETAMINOPHEN 5; 325 MG/1; MG/1
1 TABLET ORAL EVERY 6 HOURS PRN
Qty: 10 TABLET | Refills: 0 | Status: SHIPPED | OUTPATIENT
Start: 2019-04-13 | End: 2020-02-04

## 2019-04-13 RX ORDER — HYDROCODONE BITARTRATE AND ACETAMINOPHEN 5; 325 MG/1; MG/1
1 TABLET ORAL ONCE
Status: COMPLETED | OUTPATIENT
Start: 2019-04-13 | End: 2019-04-13

## 2019-04-13 RX ADMIN — HYDROCODONE BITARTRATE AND ACETAMINOPHEN 1 TABLET: 5; 325 TABLET ORAL at 20:38

## 2019-04-13 ASSESSMENT — ENCOUNTER SYMPTOMS
CARDIOVASCULAR NEGATIVE: 1
PSYCHIATRIC NEGATIVE: 1
CONSTITUTIONAL NEGATIVE: 1
ALLERGIC/IMMUNOLOGIC NEGATIVE: 1
HEMATOLOGIC/LYMPHATIC NEGATIVE: 1
RESPIRATORY NEGATIVE: 1
NEUROLOGICAL NEGATIVE: 1
EYES NEGATIVE: 1
ENDOCRINE NEGATIVE: 1
MUSCULOSKELETAL NEGATIVE: 1
GASTROINTESTINAL NEGATIVE: 1

## 2019-04-13 ASSESSMENT — MIFFLIN-ST. JEOR: SCORE: 1614.28

## 2019-04-13 NOTE — ED AVS SNAPSHOT
[facility] Emergency Department  [address] [address]  Phone:  [phone]  Fax:  [phone]                                    Dann Castillo   MRN: [id number]    Department:  [facility] Emergency Department   Date of Visit:  4/13/2019           After Visit Summary Signature Page    I have received my discharge instructions, and my questions have been answered. I have discussed any challenges I see with this plan with the nurse or doctor.    ..........................................................................................................................................  Patient/Patient Representative Signature      ..........................................................................................................................................  Patient Representative Print Name and Relationship to Patient    ..................................................               ................................................  Date                                   Time    ..........................................................................................................................................  Reviewed by Signature/Title    ...................................................              ..............................................  Date                                               Time          22EPIC Rev 08/18

## 2019-04-14 NOTE — ED PROVIDER NOTES
History     Chief Complaint   Patient presents with     Groin Pain     sharp pain for over 1.5 months now,, was seen, start abx without improvement.      HPI  Dann Castillo is a 48 year old male with a history of depression, who presents for evaluation for intermittent groin and testicular pain over the last 6 weeks.  Patient was treated for epididymal orchitis on March 20, 2019 and treated with a 10-day course of Levaquin.  He had a negative workup including urinalysis at that time.  He returns tonight stating since completing antibiotic therapy he has had no improvement in his symptoms.  Patient arrived by private car with his wife stating persistent left scrotal pain and discomfort radiating to his left groin.  He is reporting intermittent fevers and chills especially at night and reports a 6 pound weight change over the last 6 weeks.  No prior history of testicular trauma or surgery no penile discharge or difficulty with urinating.  No flank pain and no back pain.  He has been taking Aleve which has helped some of his symptoms but does not provide complete relief of his symptoms.  With ongoing pain and discomfort despite completing his antibiotics as prescribed from March 2019 he is here for further care and evaluation    Allergies:  Allergies   Allergen Reactions     Peanuts [Nuts] Hives     And the oil       Problem List:    Patient Active Problem List    Diagnosis Date Noted     Postconcussion syndrome 03/25/2016     Priority: Medium     Saw Dr Morillo at [facility] on 3/22/16 - MRI brain and neck, starting elavil 25, vestibular therapy, ongoing Physical Therapy, recheck 4-6 wks.  Note sent to scanning.       Shoulder joint pain, unspecified laterality 02/16/2016     Priority: Medium     Saw [facility] 1/21/16 - impingement syndrome and adhesive capsulitis both shoulders, recommended re-trying cortisone shots.         CARDIOVASCULAR SCREENING; LDL GOAL LESS THAN 160 10/31/2010     Priority: Medium      Mild major depression (H) 2007     Priority: Medium        Past Medical History:    No past medical history on file.    Past Surgical History:    Past Surgical History:   Procedure Laterality Date     AMPUTATE FINGER(S) Left 2018    Procedure: AMPUTATE FINGER(S);  Left long finger revision amputation.;  Surgeon: Denise Lyle MD;  Location: [facility] OR     ARTHROTOMY SHOULDER, ROTATOR CUFF REPAIR, COMBINED Right 2016    Procedure: COMBINED ARTHROTOMY SHOULDER, ROTATOR CUFF REPAIR;  Surgeon: Rafael Pacheco MD;  Location: [facility] OR     ARTHROTOMY SHOULDER, SUBACROMIAL DECOMPRESSION, COMBINED Left 2016    Procedure: COMBINED ARTHROTOMY SHOULDER, SUBACROMIAL DECOMPRESSION;  Surgeon: Rafael Pacheco MD;  Location: [facility] OR     DESTRUCTION OF PARAVERTEBRAL FACETCERVICAL / THORACIC SINGLE Right 2017    Procedure: DESTRUCTION OF PARAVERTEBRAL FACET CERVICAL / THORACIC SINGLE;  Right Radiofrequency Ablation of the Cervical 3rd occipital nerve, C3. 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      HC FULL THICK GRAFT HEAD/AXIL/GEN/HND/FT <=20 CM  2003    Full thickness skin graft to the donor site of the cross finger flap      SURGICAL HISTORY OF -   2003    Division of left cross finger flap     SURGICAL HISTORY OF -       left ACL repair and partial menisectomy       Family History:    Family History   Problem Relation Age of Onset     Heart Disease Mother      Diabetes Father      Cerebrovascular Disease Father      Depression Sister      Obesity Sister      Obesity Sister        Social History:  Marital Status:   [2]  Social History     Tobacco Use     Smoking status: Former Smoker     Packs/day: 1.50     Years: 17.00     Pack years: 25.50     Last attempt to quit: 2005     Years since quittin.8  "    Smokeless tobacco: Never Used     Tobacco comment: around second hand smoke daily   Substance Use Topics     Alcohol use: Yes     Comment: occ     Drug use: No        Medications:      HYDROcodone-acetaminophen (NORCO) 5-325 MG tablet   Acetaminophen (TYLENOL 8 HOUR ARTHRITIS PAIN PO)   acetaminophen-caffeine (EXCEDRIN TENSION HEADACHE) 500-65 MG TABS   albuterol (PROAIR HFA/PROVENTIL HFA/VENTOLIN HFA) 108 (90 Base) MCG/ACT Inhaler   cetirizine (ZYRTEC) 10 MG tablet   diphenhydrAMINE-zinc acetate (BENADRYL) cream   EPINEPHrine (EPIPEN/ADRENACLICK/OR ANY BX GENERIC EQUIV) 0.3 MG/0.3ML injection 2-pack   fexofenadine (ALLEGRA) 180 MG tablet   Ibuprofen-Diphenhydramine Cit (IBUPROFEN PM) 200-38 MG TABS         Review of Systems   Constitutional: Negative.    HENT: Negative.    Eyes: Negative.    Respiratory: Negative.    Cardiovascular: Negative.    Gastrointestinal: Negative.    Endocrine: Negative.    Genitourinary: Positive for scrotal swelling (over the last 6weeks).   Musculoskeletal: Negative.    Allergic/Immunologic: Negative.    Neurological: Negative.    Hematological: Negative.    Psychiatric/Behavioral: Negative.    All other systems reviewed and are negative.      Physical Exam   BP: 137/90  Pulse: 75  Heart Rate: 71  Temp: 97.9  F (36.6  C)  Resp: 18  Height: 170.2 cm (5' 7\")  Weight: 77.6 kg (171 lb)  SpO2: 96 %      Physical Exam   Constitutional: He is oriented to person, place, and time. He appears well-developed and well-nourished. No distress.   HENT:   Head: Normocephalic and atraumatic.   Eyes: Pupils are equal, round, and reactive to light. EOM are normal. Right eye exhibits no discharge. Left eye exhibits no discharge. No scleral icterus.   Neck: Normal range of motion. Neck supple.   Cardiovascular: Normal rate.   Pulmonary/Chest: Effort normal and breath sounds normal. No stridor. No respiratory distress. He has no wheezes. He has no rales. He exhibits no tenderness.   Abdominal: Soft. " Bowel sounds are normal. Hernia confirmed negative in the left inguinal area.   Genitourinary: Left testis shows tenderness. Left testis shows no mass and no swelling. Uncircumcised. No paraphimosis or penile erythema. No discharge found.         Musculoskeletal: Normal range of motion. He exhibits no edema or deformity.   Lymphadenopathy: No inguinal adenopathy noted on the left side.   Neurological: He is alert and oriented to person, place, and time. He displays normal reflexes. No cranial nerve deficit or sensory deficit. He exhibits normal muscle tone. Coordination normal.   Skin: Capillary refill takes less than 2 seconds. He is not diaphoretic.   Psychiatric: He has a normal mood and affect. His behavior is normal. Judgment and thought content normal.       ED Course        Procedures               Critical Care time:  none                       ED medications:  Medications   HYDROcodone-acetaminophen (NORCO) 5-325 MG per tablet 1 tablet (1 tablet Oral Given 4/13/19 2038)       ED labs and imaging:  Results for orders placed or performed during the hospital encounter of 04/13/19   US Testicular & Scrotum w Doppler Ltd    Narrative    US TESTICULAR AND SCROTUM WITH DOPPLER LIMITED 4/13/2019 9:31 PM    HISTORY: Persistent left testicular pain.     TECHNIQUE: Assessment includes the testicles and epididymides. Other  potential intrascrotal abnormalities including fluid, hernia, or  varicocele are also looked for. Finally, Doppler spectral waveform  analysis of the testicles is performed.    COMPARISON: None.    FINDINGS: Right testicle measures 4.0 x 2.2 x 3.3 cm, with a 3 mm  cyst. The left testicle measures 4.0 x 2.1 x 2.9 cm. Both testicles  demonstrate similar Doppler flow.    Normal bilateral epididymides. No evidence of hydrocele. There is a  left varicocele.       Impression    IMPRESSION:   1. Left sided varicocele.  2. 3 mm left testicular cyst.     MARION LI MD   UA reflex to Microscopic   Result  Value Ref Range    Color Urine Yellow     Appearance Urine Cloudy     Glucose Urine Negative NEG^Negative mg/dL    Bilirubin Urine Negative NEG^Negative    Ketones Urine Negative NEG^Negative mg/dL    Specific Gravity Urine 1.016 1.003 - 1.035    Blood Urine Negative NEG^Negative    pH Urine 7.0 5.0 - 7.0 pH    Protein Albumin Urine Negative NEG^Negative mg/dL    Urobilinogen mg/dL 2.0 0.0 - 2.0 mg/dL    Nitrite Urine Negative NEG^Negative    Leukocyte Esterase Urine Negative NEG^Negative    Source Midstream Urine     RBC Urine 1 0 - 2 /HPF    WBC Urine 2 0 - 5 /HPF    Bacteria Urine Few (A) NEG^Negative /HPF    Amorphous Crystals Few (A) NEG^Negative /HPF   CBC with platelets differential   Result Value Ref Range    WBC 9.1 4.0 - 11.0 10e9/L    RBC Count 5.12 4.4 - 5.9 10e12/L    Hemoglobin 15.2 13.3 - 17.7 g/dL    Hematocrit 45.6 40.0 - 53.0 %    MCV 89 78 - 100 fl    MCH 29.7 26.5 - 33.0 pg    MCHC 33.3 31.5 - 36.5 g/dL    RDW 11.8 10.0 - 15.0 %    Platelet Count 178 150 - 450 10e9/L    Diff Method Automated Method     % Neutrophils 39.4 %    % Lymphocytes 46.6 %    % Monocytes 9.8 %    % Eosinophils 3.4 %    % Basophils 0.6 %    % Immature Granulocytes 0.2 %    Nucleated RBCs 0 0 /100    Absolute Neutrophil 3.6 1.6 - 8.3 10e9/L    Absolute Lymphocytes 4.2 0.8 - 5.3 10e9/L    Absolute Monocytes 0.9 0.0 - 1.3 10e9/L    Absolute Eosinophils 0.3 0.0 - 0.7 10e9/L    Absolute Basophils 0.1 0.0 - 0.2 10e9/L    Abs Immature Granulocytes 0.0 0 - 0.4 10e9/L    Absolute Nucleated RBC 0.0    Basic metabolic panel   Result Value Ref Range    Sodium 142 133 - 144 mmol/L    Potassium 3.8 3.4 - 5.3 mmol/L    Chloride 109 94 - 109 mmol/L    Carbon Dioxide 28 20 - 32 mmol/L    Anion Gap 5 3 - 14 mmol/L    Glucose 110 (H) 70 - 99 mg/dL    Urea Nitrogen 14 7 - 30 mg/dL    Creatinine 0.93 0.66 - 1.25 mg/dL    GFR Estimate >90 >60 mL/min/[1.73_m2]    GFR Estimate If Black >90 >60 mL/min/[1.73_m2]    Calcium 8.6 8.5 - 10.1 mg/dL  "        ED Vitals:  Vitals:    04/13/19 2005 04/13/19 2130   BP: 137/90 (!) 123/93   Pulse:  75   Resp: 18    Temp: 97.9  F (36.6  C)    TempSrc: Oral    SpO2: 96% 96%   Weight: 77.6 kg (171 lb)    Height: 1.702 m (5' 7\")      Assessments & Plan (with Medical Decision Making)   Clinical impression: 48-year-old male who presented for evaluation for left  groin and scrotal pain and discomfort over the last 6 weeks.  The exact cause of his pain and discomfort as reported is unclear.  He was treated for epididyorchitis on March 20 2019 with a 10-day course of levofloxacin.  Patient reports since completing oral antibiotics he has persistent symptoms without improvement.  He has been taking Aleve which has helped some relief of his pain but reports he has chills and subjective fever at night that improves after Aleve.  No prior history of testicular surgery or trauma.  He is reporting pain radiates from the left testicle shoots up to the left inguinal region.  No penile discharge.  No pain with urination no flank pain and no back pain.  No prior history of abdominal surgery.  Exam with wife at the bedside shows uncircumcised, no penile lesion.  No penile discharge.  Tender mass over the left testicle.  No groin hernia or swelling.      ED course and Plan:  I reviewed  his medical records including his evaluation by JESSICA Nolen-March 20, 2019.  We discussed and reviewed options for care.  He reported about 6 pound weight loss in the last 6 weeks. With intermittent fever and chills and persistent groin and scrotal pain, urine was obtained, ultrasound was obtained and blood work.  He was given a dose of Norco for pain control    Normal urinalysis today without pyuria bacteriuria or hematuria normal white count.  Normal renal function.    Scrotal/testicular ultrasound today shows a left varicocele with a 3 mm left testicular cyst but no other acute pathology appreciated.  Please see the interpreting radiologist report " above    Patient was reevaluated after ultrasound, blood work, and urinalysis.  The exact cause of his pain and discomfort at this time is not clear.  With exam, workup, urinalysis, lab work and ultrasound there is no indication for repeat antibiotics.  He is discharged home with pain medication with plan to have a follow-up in urology clinic within the week. (unable to schedule urology appointment for patient)  .  Patient and spouse were comfortable with plan of care.  We also reviewed reasons to return to the emergency department for care and reevaluation including but not limited to fever, pain with urination, penile discharge, flank or back pain.        Disclaimer: This note consists of symbols derived from keyboarding, dictation and/or voice recognition software. As a result, there may be errors in the script that have gone undetected. Please consider this when interpreting information found in this chart.  I have reviewed the nursing notes.    I have reviewed the findings, diagnosis, plan and need for follow up with the patient.          Medication List      Started    HYDROcodone-acetaminophen 5-325 MG tablet  Commonly known as:  NORCO  1 tablet, Oral, EVERY 6 HOURS PRN        ASK your doctor about these medications    levofloxacin 500 MG tablet  Commonly known as:  LEVAQUIN  500 mg, Oral, DAILY  Ask about: Should I take this medication?            Final diagnoses:   Left testicular pain - Unclear cause. Pain and discomfort ongoing for about 6 weeks.  He did with a 10-day course of levofloxacin March 2019 no interval improvement       4/13/2019   [facility] EMERGENCY DEPARTMENT     Prabhjot Haro MD  04/14/19 0059

## 2019-04-14 NOTE — DISCHARGE INSTRUCTIONS
1) the exact cause of your testicular pain and groin discomfort over the last 6 weeks is not clear.  Your workup and time in the emergency department does not show a life-threatening process including infection.  We have agreed to allow you to go home with plan to take pain medication as needed until follow-up in urology clinic.  Based on your exam and workup there is no clear indication to repeat antibiotic dose particularly because he did not have any improvement after taking antibiotics for 10-day course from March 20, 2019    2) if symptoms worsen including but not limited to difficulty urinating or burning with urination, penile discharge, blood in the urine, flank or back pain you may need to return for reevaluation and consideration of starting antibiotics.    3) call the urology clinic to schedule follow-up appointment for care and additional reevaluation and further testing and workup    4) It is okay to continue to use Aleve in addition to the pain medication provided.  Avoid using regular Tylenol or extra strength Tylenol in addition to the pain medication provided.

## 2019-04-30 ENCOUNTER — OFFICE VISIT (OUTPATIENT)
Dept: UROLOGY | Facility: CLINIC | Age: 49
End: 2019-04-30
Payer: COMMERCIAL

## 2019-04-30 VITALS
SYSTOLIC BLOOD PRESSURE: 128 MMHG | RESPIRATION RATE: 16 BRPM | TEMPERATURE: 98.1 F | HEART RATE: 112 BPM | DIASTOLIC BLOOD PRESSURE: 89 MMHG

## 2019-04-30 DIAGNOSIS — N45.1 EPIDIDYMITIS: Primary | ICD-10-CM

## 2019-04-30 PROCEDURE — 99204 OFFICE O/P NEW MOD 45 MIN: CPT | Performed by: UROLOGY

## 2019-04-30 RX ORDER — SULFAMETHOXAZOLE/TRIMETHOPRIM 800-160 MG
1 TABLET ORAL 2 TIMES DAILY
Qty: 20 TABLET | Refills: 1 | Status: SHIPPED | OUTPATIENT
Start: 2019-04-30 | End: 2019-09-05

## 2019-04-30 NOTE — PATIENT INSTRUCTIONS
Per physician instructions.    If you have questions or concerns on any instructions given to you by your provider today or if you need to schedule an appointment, you can reach us at [phone].    Thank you!

## 2019-04-30 NOTE — NURSING NOTE
"Chief Complaint   Patient presents with     Consult     Testicular pain        Initial BP (!) 139/93 (BP Location: Right arm, Patient Position: Chair, Cuff Size: Adult Regular)   Pulse 112   Temp 98.1  F (36.7  C) (Tympanic)   Resp 16  Estimated body mass index is 26.78 kg/m  as calculated from the following:    Height as of 4/13/19: 1.702 m (5' 7\").    Weight as of 4/13/19: 77.6 kg (171 lb).  BP completed using cuff size: regular   Medications and allergies reviewed.      Shi HARRISON CMA     "
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
## 2019-09-05 ENCOUNTER — OFFICE VISIT (OUTPATIENT)
Dept: FAMILY MEDICINE | Facility: CLINIC | Age: 49
End: 2019-09-05
Payer: COMMERCIAL

## 2019-09-05 VITALS
BODY MASS INDEX: 26.78 KG/M2 | RESPIRATION RATE: 18 BRPM | SYSTOLIC BLOOD PRESSURE: 122 MMHG | WEIGHT: 171 LBS | DIASTOLIC BLOOD PRESSURE: 64 MMHG | HEART RATE: 100 BPM

## 2019-09-05 DIAGNOSIS — K64.4 EXTERNAL HEMORRHOIDS: Primary | ICD-10-CM

## 2019-09-05 DIAGNOSIS — Z87.09 HISTORY OF ASTHMA: ICD-10-CM

## 2019-09-05 DIAGNOSIS — J30.2 SEASONAL ALLERGIC RHINITIS, UNSPECIFIED TRIGGER: ICD-10-CM

## 2019-09-05 PROCEDURE — 99213 OFFICE O/P EST LOW 20 MIN: CPT | Performed by: NURSE PRACTITIONER

## 2019-09-05 RX ORDER — ALBUTEROL SULFATE 90 UG/1
2 AEROSOL, METERED RESPIRATORY (INHALATION) EVERY 6 HOURS PRN
Qty: 1 INHALER | Refills: 0 | Status: SHIPPED | OUTPATIENT
Start: 2019-09-05 | End: 2020-11-12

## 2019-09-05 RX ORDER — HYDROCORTISONE ACETATE 25 MG/1
25 SUPPOSITORY RECTAL 2 TIMES DAILY
Qty: 1 BOX | Refills: 1 | Status: SHIPPED | OUTPATIENT
Start: 2019-09-05 | End: 2019-10-23

## 2019-09-05 NOTE — PROGRESS NOTES
Subjective     Ed Jonathan is a 49 year old male who presents to clinic today for the following health issues:    HPI   Hemorrhoids  Onset: Last week     Description:   Pain: YES  Itching: YES    Accompanying Signs & Symptoms:  Blood streaked toilet paper: YES  Blood in stool: no   Changes in stool pattern: no     History:   Any previous GI studies done:Colonoscopy  Family History of colon cancer: YES- Father     Precipitating factors:   None    Alleviating factors:  None    Therapies Tried and outcome: Has tried multiple therapies otc      Patient Active Problem List   Diagnosis     Mild major depression (H)     CARDIOVASCULAR SCREENING; LDL GOAL LESS THAN 160     Shoulder joint pain, unspecified laterality     Postconcussion syndrome     Past Surgical History:   Procedure Laterality Date     AMPUTATE FINGER(S) Left 2/2/2018    Procedure: AMPUTATE FINGER(S);  Left long finger revision amputation.;  Surgeon: Denise Lyle MD;  Location: [facility]     ARTHROTOMY SHOULDER, ROTATOR CUFF REPAIR, COMBINED Right 5/2/2016    Procedure: COMBINED ARTHROTOMY SHOULDER, ROTATOR CUFF REPAIR;  Surgeon: Rafael Pacheco MD;  Location: [facility]     ARTHROTOMY SHOULDER, SUBACROMIAL DECOMPRESSION, COMBINED Left 6/13/2016    Procedure: COMBINED ARTHROTOMY SHOULDER, SUBACROMIAL DECOMPRESSION;  Surgeon: Rafael Pacheco MD;  Location: [facility]     DESTRUCTION OF PARAVERTEBRAL FACETCERVICAL / THORACIC SINGLE Right 8/25/2017    Procedure: DESTRUCTION OF PARAVERTEBRAL FACET CERVICAL / THORACIC SINGLE;  Right Radiofrequency Ablation of the Cervical 3rd occipital nerve, C3. 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      HC FULL THICK GRAFT HEAD/AXIL/GEN/HND/FT <=20 CM  8/31/2003    Full thickness skin graft to the donor site of the cross finger flap      SURGICAL  HISTORY OF -   2003    Division of left cross finger flap     SURGICAL HISTORY OF -       left ACL repair and partial menisectomy       Social History     Tobacco Use     Smoking status: Former Smoker     Packs/day: 1.50     Years: 17.00     Pack years: 25.50     Last attempt to quit: 2005     Years since quittin.2     Smokeless tobacco: Never Used     Tobacco comment: around second hand smoke daily   Substance Use Topics     Alcohol use: Yes     Comment: occ     Family History   Problem Relation Age of Onset     Heart Disease Mother      Diabetes Father      Cerebrovascular Disease Father      Colon Cancer Father      Depression Sister      Obesity Sister      Obesity Sister          Current Outpatient Medications   Medication Sig Dispense Refill     Acetaminophen (TYLENOL 8 HOUR ARTHRITIS PAIN PO) Take 1,300 mg by mouth 2 times daily       acetaminophen-caffeine (EXCEDRIN TENSION HEADACHE) 500-65 MG TABS Take 3 tablets by mouth every 6 hours as needed for mild pain       albuterol (PROAIR HFA/PROVENTIL HFA/VENTOLIN HFA) 108 (90 Base) MCG/ACT inhaler Inhale 2 puffs into the lungs every 6 hours as needed for shortness of breath / dyspnea or wheezing 1 Inhaler 0     cetirizine (ZYRTEC) 10 MG tablet Take 1 tablet by mouth 2 times daily. 60 tablet 11     diphenhydrAMINE-zinc acetate (BENADRYL) cream Apply 50 mg topically as needed.       EPINEPHrine (EPIPEN/ADRENACLICK/OR ANY BX GENERIC EQUIV) 0.3 MG/0.3ML injection 2-pack Inject 0.3 mLs (0.3 mg) into the muscle once as needed for anaphylaxis 0.6 mL 0     fexofenadine (ALLEGRA) 180 MG tablet Take 1 tablet by mouth daily. Take upon awakening. 30 tablet 11     hydrocortisone (ANUSOL-HC) 2.5 % cream Place rectally 2 times daily as needed for hemorrhoids 30 g 1     hydrocortisone (ANUSOL-HC) 25 MG suppository Place 1 suppository (25 mg) rectally 2 times daily 1 Box 1     Ibuprofen-Diphenhydramine Cit (IBUPROFEN PM) 200-38 MG TABS Take 1 tablet by mouth  At Bedtime       HYDROcodone-acetaminophen (NORCO) 5-325 MG tablet Take 1 tablet by mouth every 6 hours as needed for moderate to severe pain (Patient not taking: Reported on 9/5/2019) 10 tablet 0     Allergies   Allergen Reactions     Peanuts [Nuts] Hives     And the oil         Reviewed and updated as needed this visit by Provider  Tobacco  Allergies  Meds  Problems  Med Hx  Surg Hx  Fam Hx         Review of Systems   ROS COMP: Constitutional, HEENT, cardiovascular, pulmonary, GI, , musculoskeletal, neuro, skin, endocrine and psych systems are negative, except as otherwise noted.      Objective    /64 (BP Location: Right arm, Patient Position: Chair, Cuff Size: Adult Regular)   Pulse 100   Resp 18   Wt 77.6 kg (171 lb)   BMI 26.78 kg/m    Body mass index is 26.78 kg/m .  Physical Exam   GENERAL: healthy, alert and no distress, nontoxic in appearance  EYES: Eyes grossly normal to inspection, PERRL and conjunctivae and sclerae normal  HENT: normocephalic  NECK: supple with full ROM  RESP: lungs clear to auscultation - no rales, rhonchi or wheezes  CV: regular rate and rhythm, normal S1 S2, no S3 or S4, no murmur, click or rub, no peripheral edema   ABDOMEN: soft, nontender, no hepatosplenomegaly, no masses and bowel sounds normal  MS: no gross musculoskeletal defects noted, no edema  External hemorrhoid at 10 O'clock with pt in right Hamm position.    Diagnostic Test Results:  Labs reviewed in Epic  No results found for this or any previous visit (from the past 24 hour(s)).        Assessment & Plan   Problem List Items Addressed This Visit     None      Visit Diagnoses     External hemorrhoids    -  Primary    Relevant Medications    hydrocortisone (ANUSOL-HC) 2.5 % cream    hydrocortisone (ANUSOL-HC) 25 MG suppository    Seasonal allergic rhinitis, unspecified trigger        Relevant Medications    albuterol (PROAIR HFA/PROVENTIL HFA/VENTOLIN HFA) 108 (90 Base) MCG/ACT inhaler    History of  "asthma        Relevant Medications    albuterol (PROAIR HFA/PROVENTIL HFA/VENTOLIN HFA) 108 (90 Base) MCG/ACT inhaler             BMI:   Estimated body mass index is 26.78 kg/m  as calculated from the following:    Height as of 4/13/19: 1.702 m (5' 7\").    Weight as of this encounter: 77.6 kg (171 lb).           Patient Instructions     Use cream and suppositories as directed.    If symptoms worsen or do not resolve follow up with your PCP.    Follow-up with your primary care provider next week and as needed.    Indications for emergent return to emergency department discussed with patient, who verbalized good understanding and agreement.  Patient understands the limitations of today's evaluation.             Patient Education     Hemorrhoids    Hemorrhoids are swollen and inflamed veins inside the rectum and near the anus. The rectum is the last several inches of the colon. The anus is the passage between the rectum and the outside of the body.  Causes  The veins can become swollen due to increased pressure in them. This is most often caused by:    Chronic constipation or diarrhea    Straining when having a bowel movement    Sitting too long on the toilet    A low-fiber diet    Pregnancy  Symptoms    Bleeding from the rectum (this may be noticeable after bowel movements)    Lump near the anus    Itching around the anus    Pain around the anus  There are different types of hemorrhoids. Depending on the type you have and the severity, you may be able to treat yourself at home. In some cases, a procedure may be the best treatment option. Your healthcare provider can tell you more about this, if needed.  Home care  General care    To get relief from pain or itching, try:  ? Medicines. Your healthcare provider may recommend stool softeners, suppositories, or laxatives to help manage constipation. Use these exactly as directed.  ? Sitz baths. A sitz bath involves sitting in a few inches of warm bath water. Be careful not " to make the water so hot that you burn yourself--test it before sitting in it. Soak for about 10 to 15 minutes a few times a day. This may help relieve pain.  ? Topical products. Your healthcare provider may prescribe or recommend creams, ointments, or pads that can be applied to the hemorrhoid. Use these exactly as directed.  Tips to help prevent hemorrhoids    Eat more fiber. Fiber adds bulk to stool and absorbs water as it moves through your colon. This makes stool softer and easier to pass.  ? Increase the fiber in your diet with more fiber-rich foods. These include fresh fruit, vegetables, and whole grains.  ? Take a fiber supplement or bulking agent, if advised by your healthcare provider. These include products such as psyllium or methylcellulose.    Drink more water. Your healthcare provider may direct you to drink plenty of water. This can help keep stool soft.    Be more active. Frequent exercise aids digestion and helps prevent constipation. It may also help make bowel movements more regular.    Don t strain during bowel movements. This can make hemorrhoids more likely. Also, don t sit on the toilet for long periods of time.  Follow-up care  Follow up with your healthcare provider as advised. If a culture or imaging tests were done, someone will let you know the results when they are ready. This may take a few days or longer. If your healthcare provider recommends a procedure for your hemorrhoids, these options can be discussed. Options may include surgery and outpatient office treatments.  When to seek medical advice  Call your healthcare provider right away if any of these occur:    Increased bleeding from the rectum    Increased pain around the rectum or anus    Weakness or dizziness  Call 911  Call 911 if any of these occur:    Trouble breathing or swallowing    Fainting or loss of consciousness    Unusually fast heart rate    Vomiting blood    Large amounts of blood in stool or black, tarry  stools  Date Last Reviewed: 9/1/2017 2000-2018 [employer]. [address] [place] [address], [place] [address]. All rights reserved. This information is not intended as a substitute for professional medical care. Always follow your healthcare professional's instructions.           Patient Education     Treating Hemorrhoids: Self-Care    Follow your healthcare provider s advice about caring for your hemorrhoids at home. Some treatments help relieve symptoms right away. Others involve making changes in your diet and exercise habits. These can help ease constipation and prevent hemorrhoid symptoms from coming back.  Relieving symptoms  Your healthcare provider may prescribe anti-inflammatory medicine to help ease your symptoms. The following tips will also help relieve pain and swelling.    Take sitz baths. Taking a sitz bath means sitting in a few inches of warm bath water. Soaking for 10 minutes twice a day can provide welcome relief from painful hemorrhoids. It can also help the area stay clean.    Develop good bowel habits. Use the bathroom when you need to. Don t ignore the urge to move your bowels. This can lead to constipation, hard stools, and straining. Also, don t read while on the toilet. Sit only as long as needed. Wipe gently with soft, unscented toilet tissue or baby wipes.    Use ice packs. Placing an ice pack on a thrombosed external hemorrhoid can help relieve pain right away. It will also help reduce the blood clot. Use the ice for 15 to 20 minutes at a time. Keep a cloth between the ice and your skin to prevent skin damage.    Use other measures. Laxatives and enemas can help ease constipation. But use them only on your healthcare provider s advice. For symptom relief, try using cotton pads soaked in witch hazel. These are available at most drugstores. Over-the-counter hemorrhoid ointments and petroleum jelly can also provide relief.  Add fiber to your diet  Adding fiber to your diet can help  relieve constipation by making stools softer and easier to pass. To increase your fiber intake, your healthcare provider may recommend a bulking agent, such as psyllium. This is a high-fiber supplement available at most grocery stores and drugstores. Eating more fiber-rich foods will also help. There are two types of fiber:    Insoluble fiber is the main ingredient in bulking agents. It s also found in foods such as wheat bran, whole-grain breads, fresh fruits, and vegetables.    Soluble fiber is found in foods such as oat bran. Although soluble fiber is good for you, it may not ease constipation as much as foods high in insoluble fiber.  Drink more water  Along with a high-fiber diet, drinking more water can help ease constipation. This is because insoluble fiber absorbs water, making stools soft and bulky. Be sure to drink plenty of water throughout the day. Drinking fruit juices, such as prune juice or apple juice, can also help prevent constipation.  Get more exercise  Regular exercise aids digestion and helps prevent constipation. It s also great for your health. So talk with your healthcare provider about starting an exercise program. Low-impact activities, such as swimming or walking, are good places to start. Take it easy at first. And remember to drink plenty of water when you exercise.  High-fiber foods  High-fiber foods offer many benefits. By making your stools softer, they help heal and prevent swollen hemorrhoids. They may also help reduce the risk of colon and rectal cancer. Best of all, they re usually low in calories and taste great. Here are some examples of fiber-rich foods.    Whole grains, such as wheat bran, corn bran, and brown rice.    Vegetables, especially carrots, broccoli, cabbage, and peas.    Fruits, such as apples, bananas, raisins, peaches, and pears.    Nuts and legumes, especially peanuts, lentils, and kidney beans.  Easy ways to add fiber  The tips below offer some simple ways to  add more high-fiber foods to your meals.    Start your day with a high-fiber breakfast. Eat a wheat bran cereal along with a sliced banana. Or, try peanut butter on whole-wheat toast.    Eat carrot sticks for snacks. They re easy to prepare, taste great, and are low in calories.    Use whole-grain breads instead of white bread for sandwiches.    Eat fruits for treats. Try an apple and some raisins instead of a candy bar.   Date Last Reviewed: 7/1/2016 2000-2018 [employer]. [address] [place], [place] [id number]. All rights reserved. This information is not intended as a substitute for professional medical care. Always follow your healthcare professional's instructions.           Patient Education     Understanding Hemorrhoids    Hemorrhoid tissues are  cushions  of blood vessels that swell slightly during bowel movements. Too much pressure on the anal canal can make these tissues remain enlarged, become inflamed, and cause symptoms. This can happen both inside and outside the anal canal.  Parts of the anal canal  The parts of the anal canal are:    Internal hemorrhoid tissue is in the upper area of the anal canal.    The rectum is the last several inches of the colon. This is where stool is stored prior to bowel movements.    Anal sphincters are ring-shaped muscles that expand and contract to control the anal opening.    External hemorrhoid tissue lies under the anal skin.    The anus is the passage between the rectum and the outside of the body.  Normal hemorrhoid tissue  Hemorrhoid tissues play an important role in helping your body eliminate waste. Food passes from the stomach through the intestines. The waste (stool) then travels through the colon to the rectum. It is stored in the rectum until it s ready to be passed from the anus. During bowel movements, hemorrhoids swell with blood and become slightly larger. This swelling helps protect and cushion the anal canal as stool passes from the  body. Once the stool has passed, the tissues stop swelling and return to normal.  Problem hemorrhoids  Pressure due to straining or other factors can cause hemorrhoid tissues to remain swollen. When this happens to the hemorrhoid tissues in the anal canal they re called internal hemorrhoids. Swollen tissues around the anal opening are called external hemorrhoids. Depending on the location, your symptoms can differ.    Internal hemorrhoids often happen in clusters around the wall of the anal canal. They are usually painless. But they may prolapse (protrude out of the anus) due to straining or pressure from hard stool. After the bowel movement is over, they may then reduce (return inside the body). Internal hemorrhoids often bleed. They can also discharge mucus.      External hemorrhoids are located at the anal opening, just beneath the skin. These tissues rarely cause problems unless they thrombose (form a blood clot). When this happens, a hard, bluish lump may appear. A thrombosed hemorrhoid also causes sudden, severe pain. In time, the clot may go away on its own. This sometimes leaves a  skin tag  of tissue stretched by the clot.  Hemorrhoid symptoms  Hemorrhoid symptoms may include:    Pain or a burning sensation    Bleeding during bowel movements    Protrusion of tissue from the anus    Itching around the anus  Causes of hemorrhoids  There s no single cause of hemorrhoids. Most often, though, they are caused by too much pressure on the anal canal. This can be due to:    Chronic (ongoing) constipation    Straining during bowel movements    Sitting too long on the toilet    Strenuous exercise or heavy lifting    Pregnancy and childbirth    Aging    Diarrhea  Date Last Reviewed: 7/1/2016 2000-2018 [employer]. [address] [place] [address], [place] [address]. All rights reserved. This information is not intended as a substitute for professional medical care. Always follow your healthcare professional's  instructions.             Return in about 1 week (around 9/12/2019), or if symptoms worsen or fail to improve, for Follow up with your primary care provider.    OLGA Nash [facility]

## 2019-09-05 NOTE — NURSING NOTE
"Chief Complaint   Patient presents with     Rectal Problem       Initial /64 (BP Location: Right arm, Patient Position: Chair, Cuff Size: Adult Regular)   Pulse 100   Resp 18   Wt 77.6 kg (171 lb)   BMI 26.78 kg/m   Estimated body mass index is 26.78 kg/m  as calculated from the following:    Height as of 4/13/19: 1.702 m (5' 7\").    Weight as of this encounter: 77.6 kg (171 lb).    Patient presents to the clinic using No DME    Health Maintenance that is potentially due pending provider review:  NONE    n/a    Is there anyone who you would like to be able to receive your results? No  If yes have patient fill out SILVIO  Nano Stubbs M.A.        "

## 2019-09-05 NOTE — PATIENT INSTRUCTIONS
Use cream and suppositories as directed.    If symptoms worsen or do not resolve follow up with your PCP.    Follow-up with your primary care provider next week and as needed.    Indications for emergent return to emergency department discussed with patient, who verbalized good understanding and agreement.  Patient understands the limitations of today's evaluation.             Patient Education     Hemorrhoids    Hemorrhoids are swollen and inflamed veins inside the rectum and near the anus. The rectum is the last several inches of the colon. The anus is the passage between the rectum and the outside of the body.  Causes  The veins can become swollen due to increased pressure in them. This is most often caused by:    Chronic constipation or diarrhea    Straining when having a bowel movement    Sitting too long on the toilet    A low-fiber diet    Pregnancy  Symptoms    Bleeding from the rectum (this may be noticeable after bowel movements)    Lump near the anus    Itching around the anus    Pain around the anus  There are different types of hemorrhoids. Depending on the type you have and the severity, you may be able to treat yourself at home. In some cases, a procedure may be the best treatment option. Your healthcare provider can tell you more about this, if needed.  Home care  General care    To get relief from pain or itching, try:  ? Medicines. Your healthcare provider may recommend stool softeners, suppositories, or laxatives to help manage constipation. Use these exactly as directed.  ? Sitz baths. A sitz bath involves sitting in a few inches of warm bath water. Be careful not to make the water so hot that you burn yourself--test it before sitting in it. Soak for about 10 to 15 minutes a few times a day. This may help relieve pain.  ? Topical products. Your healthcare provider may prescribe or recommend creams, ointments, or pads that can be applied to the hemorrhoid. Use these exactly as directed.  Tips to  help prevent hemorrhoids    Eat more fiber. Fiber adds bulk to stool and absorbs water as it moves through your colon. This makes stool softer and easier to pass.  ? Increase the fiber in your diet with more fiber-rich foods. These include fresh fruit, vegetables, and whole grains.  ? Take a fiber supplement or bulking agent, if advised by your healthcare provider. These include products such as psyllium or methylcellulose.    Drink more water. Your healthcare provider may direct you to drink plenty of water. This can help keep stool soft.    Be more active. Frequent exercise aids digestion and helps prevent constipation. It may also help make bowel movements more regular.    Don t strain during bowel movements. This can make hemorrhoids more likely. Also, don t sit on the toilet for long periods of time.  Follow-up care  Follow up with your healthcare provider as advised. If a culture or imaging tests were done, someone will let you know the results when they are ready. This may take a few days or longer. If your healthcare provider recommends a procedure for your hemorrhoids, these options can be discussed. Options may include surgery and outpatient office treatments.  When to seek medical advice  Call your healthcare provider right away if any of these occur:    Increased bleeding from the rectum    Increased pain around the rectum or anus    Weakness or dizziness  Call 911  Call 911 if any of these occur:    Trouble breathing or swallowing    Fainting or loss of consciousness    Unusually fast heart rate    Vomiting blood    Large amounts of blood in stool or black, tarry stools  Date Last Reviewed: 9/1/2017 2000-2018 [employer]. [address] [place] [address], [place] [id number]. All rights reserved. This information is not intended as a substitute for professional medical care. Always follow your healthcare professional's instructions.           Patient Education     Treating Hemorrhoids:  Self-Care    Follow your healthcare provider s advice about caring for your hemorrhoids at home. Some treatments help relieve symptoms right away. Others involve making changes in your diet and exercise habits. These can help ease constipation and prevent hemorrhoid symptoms from coming back.  Relieving symptoms  Your healthcare provider may prescribe anti-inflammatory medicine to help ease your symptoms. The following tips will also help relieve pain and swelling.    Take sitz baths. Taking a sitz bath means sitting in a few inches of warm bath water. Soaking for 10 minutes twice a day can provide welcome relief from painful hemorrhoids. It can also help the area stay clean.    Develop good bowel habits. Use the bathroom when you need to. Don t ignore the urge to move your bowels. This can lead to constipation, hard stools, and straining. Also, don t read while on the toilet. Sit only as long as needed. Wipe gently with soft, unscented toilet tissue or baby wipes.    Use ice packs. Placing an ice pack on a thrombosed external hemorrhoid can help relieve pain right away. It will also help reduce the blood clot. Use the ice for 15 to 20 minutes at a time. Keep a cloth between the ice and your skin to prevent skin damage.    Use other measures. Laxatives and enemas can help ease constipation. But use them only on your healthcare provider s advice. For symptom relief, try using cotton pads soaked in witch hazel. These are available at most drugstores. Over-the-counter hemorrhoid ointments and petroleum jelly can also provide relief.  Add fiber to your diet  Adding fiber to your diet can help relieve constipation by making stools softer and easier to pass. To increase your fiber intake, your healthcare provider may recommend a bulking agent, such as psyllium. This is a high-fiber supplement available at most grocery stores and drugstores. Eating more fiber-rich foods will also help. There are two types of  fiber:    Insoluble fiber is the main ingredient in bulking agents. It s also found in foods such as wheat bran, whole-grain breads, fresh fruits, and vegetables.    Soluble fiber is found in foods such as oat bran. Although soluble fiber is good for you, it may not ease constipation as much as foods high in insoluble fiber.  Drink more water  Along with a high-fiber diet, drinking more water can help ease constipation. This is because insoluble fiber absorbs water, making stools soft and bulky. Be sure to drink plenty of water throughout the day. Drinking fruit juices, such as prune juice or apple juice, can also help prevent constipation.  Get more exercise  Regular exercise aids digestion and helps prevent constipation. It s also great for your health. So talk with your healthcare provider about starting an exercise program. Low-impact activities, such as swimming or walking, are good places to start. Take it easy at first. And remember to drink plenty of water when you exercise.  High-fiber foods  High-fiber foods offer many benefits. By making your stools softer, they help heal and prevent swollen hemorrhoids. They may also help reduce the risk of colon and rectal cancer. Best of all, they re usually low in calories and taste great. Here are some examples of fiber-rich foods.    Whole grains, such as wheat bran, corn bran, and brown rice.    Vegetables, especially carrots, broccoli, cabbage, and peas.    Fruits, such as apples, bananas, raisins, peaches, and pears.    Nuts and legumes, especially peanuts, lentils, and kidney beans.  Easy ways to add fiber  The tips below offer some simple ways to add more high-fiber foods to your meals.    Start your day with a high-fiber breakfast. Eat a wheat bran cereal along with a sliced banana. Or, try peanut butter on whole-wheat toast.    Eat carrot sticks for snacks. They re easy to prepare, taste great, and are low in calories.    Use whole-grain breads instead of  white bread for sandwiches.    Eat fruits for treats. Try an apple and some raisins instead of a candy bar.   Date Last Reviewed: 7/1/2016 2000-2018 [employer]. [address] [place] [address], [place] [address]. All rights reserved. This information is not intended as a substitute for professional medical care. Always follow your healthcare professional's instructions.           Patient Education     Understanding Hemorrhoids    Hemorrhoid tissues are  cushions  of blood vessels that swell slightly during bowel movements. Too much pressure on the anal canal can make these tissues remain enlarged, become inflamed, and cause symptoms. This can happen both inside and outside the anal canal.  Parts of the anal canal  The parts of the anal canal are:    Internal hemorrhoid tissue is in the upper area of the anal canal.    The rectum is the last several inches of the colon. This is where stool is stored prior to bowel movements.    Anal sphincters are ring-shaped muscles that expand and contract to control the anal opening.    External hemorrhoid tissue lies under the anal skin.    The anus is the passage between the rectum and the outside of the body.  Normal hemorrhoid tissue  Hemorrhoid tissues play an important role in helping your body eliminate waste. Food passes from the stomach through the intestines. The waste (stool) then travels through the colon to the rectum. It is stored in the rectum until it s ready to be passed from the anus. During bowel movements, hemorrhoids swell with blood and become slightly larger. This swelling helps protect and cushion the anal canal as stool passes from the body. Once the stool has passed, the tissues stop swelling and return to normal.  Problem hemorrhoids  Pressure due to straining or other factors can cause hemorrhoid tissues to remain swollen. When this happens to the hemorrhoid tissues in the anal canal they re called internal hemorrhoids. Swollen tissues around  the anal opening are called external hemorrhoids. Depending on the location, your symptoms can differ.    Internal hemorrhoids often happen in clusters around the wall of the anal canal. They are usually painless. But they may prolapse (protrude out of the anus) due to straining or pressure from hard stool. After the bowel movement is over, they may then reduce (return inside the body). Internal hemorrhoids often bleed. They can also discharge mucus.      External hemorrhoids are located at the anal opening, just beneath the skin. These tissues rarely cause problems unless they thrombose (form a blood clot). When this happens, a hard, bluish lump may appear. A thrombosed hemorrhoid also causes sudden, severe pain. In time, the clot may go away on its own. This sometimes leaves a  skin tag  of tissue stretched by the clot.  Hemorrhoid symptoms  Hemorrhoid symptoms may include:    Pain or a burning sensation    Bleeding during bowel movements    Protrusion of tissue from the anus    Itching around the anus  Causes of hemorrhoids  There s no single cause of hemorrhoids. Most often, though, they are caused by too much pressure on the anal canal. This can be due to:    Chronic (ongoing) constipation    Straining during bowel movements    Sitting too long on the toilet    Strenuous exercise or heavy lifting    Pregnancy and childbirth    Aging    Diarrhea  Date Last Reviewed: 7/1/2016 2000-2018 [employer]. [address] [place] [address], [place] [address]. All rights reserved. This information is not intended as a substitute for professional medical care. Always follow your healthcare professional's instructions.

## 2019-10-02 ENCOUNTER — HEALTH MAINTENANCE LETTER (OUTPATIENT)
Age: 49
End: 2019-10-02

## 2019-12-27 ENCOUNTER — TELEPHONE (OUTPATIENT)
Dept: FAMILY MEDICINE | Facility: CLINIC | Age: 49
End: 2019-12-27

## 2019-12-27 NOTE — TELEPHONE ENCOUNTER
Reason for Call: Request for an order or referral:    Order or referral being requested: referral for specialist for:  existing Hemorids.    Date needed: as soon as possible    Has the patient been seen by the PCP for this problem? YES    Additional comments: none    Phone number Patient can be reached at:  Home number on file [phone] (home)    Best Time:  any    Can we leave a detailed message on this number?  YES    Call taken on 12/27/2019 at 10:01 AM by Briana Mendez

## 2019-12-27 NOTE — TELEPHONE ENCOUNTER
I have attempted to contact this patient x2 by phone with the following results: the subscriber you have dialed is not in service.    India LEVY RN

## 2019-12-27 NOTE — TELEPHONE ENCOUNTER
Pt was seen for this by OLGA Beckman on 9/5/19. Pt was instructed at that visit to 'Use cream and suppositories as directed, If symptoms worsen or do not resolve follow up with your PCP, Follow-up with your primary care provider next week and as needed'    Do you want to see him or place referral?    Thanks,    India LEVY RN

## 2020-01-27 ENCOUNTER — TELEPHONE (OUTPATIENT)
Dept: FAMILY MEDICINE | Facility: CLINIC | Age: 50
End: 2020-01-27

## 2020-01-27 DIAGNOSIS — K64.4 EXTERNAL HEMORRHOIDS: Primary | ICD-10-CM

## 2020-01-27 NOTE — TELEPHONE ENCOUNTER
Ed says he called 12/27/19 regarding getting a referral to surgery for hemorrhoids. He says he never heard back.    I checked his phone number and we had the wrong number listed which I have now corrected in his chart.    I gave him phone number to call to schedule with surgery in [place].    Please put in a referral in case he needs for his insurance.    No need to call him back.

## 2020-01-29 ENCOUNTER — TRANSFERRED RECORDS (OUTPATIENT)
Dept: HEALTH INFORMATION MANAGEMENT | Facility: CLINIC | Age: 50
End: 2020-01-29

## 2020-02-04 ENCOUNTER — APPOINTMENT (OUTPATIENT)
Dept: CT IMAGING | Facility: CLINIC | Age: 50
End: 2020-02-04
Attending: FAMILY MEDICINE
Payer: COMMERCIAL

## 2020-02-04 ENCOUNTER — TELEPHONE (OUTPATIENT)
Dept: FAMILY MEDICINE | Facility: CLINIC | Age: 50
End: 2020-02-04

## 2020-02-04 ENCOUNTER — HOSPITAL ENCOUNTER (EMERGENCY)
Facility: CLINIC | Age: 50
Discharge: HOME | End: 2020-02-04
Attending: FAMILY MEDICINE | Admitting: FAMILY MEDICINE
Payer: COMMERCIAL

## 2020-02-04 VITALS
RESPIRATION RATE: 16 BRPM | BODY MASS INDEX: 26.78 KG/M2 | TEMPERATURE: 98 F | DIASTOLIC BLOOD PRESSURE: 87 MMHG | WEIGHT: 171 LBS | OXYGEN SATURATION: 95 % | HEART RATE: 74 BPM | SYSTOLIC BLOOD PRESSURE: 106 MMHG

## 2020-02-04 DIAGNOSIS — R07.89 CHEST WALL PAIN: ICD-10-CM

## 2020-02-04 DIAGNOSIS — R07.89 ATYPICAL CHEST PAIN: ICD-10-CM

## 2020-02-04 LAB
ALBUMIN SERPL-MCNC: 4 G/DL (ref 3.4–5)
ALBUMIN UR-MCNC: NEGATIVE MG/DL
ALP SERPL-CCNC: 71 U/L (ref 40–150)
ALT SERPL W P-5'-P-CCNC: 30 U/L (ref 0–70)
AMORPH CRY #/AREA URNS HPF: ABNORMAL /HPF
ANION GAP SERPL CALCULATED.3IONS-SCNC: 7 MMOL/L (ref 3–14)
APPEARANCE UR: ABNORMAL
AST SERPL W P-5'-P-CCNC: 24 U/L (ref 0–45)
BASOPHILS # BLD AUTO: 0.1 10E9/L (ref 0–0.2)
BASOPHILS NFR BLD AUTO: 0.4 %
BILIRUB SERPL-MCNC: 0.5 MG/DL (ref 0.2–1.3)
BILIRUB UR QL STRIP: NEGATIVE
BUN SERPL-MCNC: 12 MG/DL (ref 7–30)
CALCIUM SERPL-MCNC: 8.9 MG/DL (ref 8.5–10.1)
CHLORIDE SERPL-SCNC: 108 MMOL/L (ref 94–109)
CO2 SERPL-SCNC: 24 MMOL/L (ref 20–32)
COLOR UR AUTO: YELLOW
CREAT SERPL-MCNC: 0.78 MG/DL (ref 0.66–1.25)
CRP SERPL-MCNC: 7.2 MG/L (ref 0–8)
D DIMER PPP FEU-MCNC: <0.3 UG/ML FEU (ref 0–0.5)
DIFFERENTIAL METHOD BLD: ABNORMAL
EOSINOPHIL # BLD AUTO: 0.1 10E9/L (ref 0–0.7)
EOSINOPHIL NFR BLD AUTO: 0.8 %
ERYTHROCYTE [DISTWIDTH] IN BLOOD BY AUTOMATED COUNT: 11.9 % (ref 10–15)
FLUAV+FLUBV AG SPEC QL: NEGATIVE
FLUAV+FLUBV AG SPEC QL: NEGATIVE
GFR SERPL CREATININE-BSD FRML MDRD: >90 ML/MIN/{1.73_M2}
GLUCOSE SERPL-MCNC: 135 MG/DL (ref 70–99)
GLUCOSE UR STRIP-MCNC: NEGATIVE MG/DL
HCT VFR BLD AUTO: 47.3 % (ref 40–53)
HGB BLD-MCNC: 16.2 G/DL (ref 13.3–17.7)
HGB UR QL STRIP: NEGATIVE
IMM GRANULOCYTES # BLD: 0.1 10E9/L (ref 0–0.4)
IMM GRANULOCYTES NFR BLD: 0.3 %
INR PPP: 1.02 (ref 0.86–1.14)
KETONES UR STRIP-MCNC: NEGATIVE MG/DL
LACTATE BLD-SCNC: 2.5 MMOL/L (ref 0.7–2)
LACTATE BLD-SCNC: 2.8 MMOL/L (ref 0.7–2)
LEUKOCYTE ESTERASE UR QL STRIP: NEGATIVE
LIPASE SERPL-CCNC: 195 U/L (ref 73–393)
LYMPHOCYTES # BLD AUTO: 3.3 10E9/L (ref 0.8–5.3)
LYMPHOCYTES NFR BLD AUTO: 21.7 %
MCH RBC QN AUTO: 30.1 PG (ref 26.5–33)
MCHC RBC AUTO-ENTMCNC: 34.2 G/DL (ref 31.5–36.5)
MCV RBC AUTO: 88 FL (ref 78–100)
MONOCYTES # BLD AUTO: 0.6 10E9/L (ref 0–1.3)
MONOCYTES NFR BLD AUTO: 3.8 %
NEUTROPHILS # BLD AUTO: 11.1 10E9/L (ref 1.6–8.3)
NEUTROPHILS NFR BLD AUTO: 73 %
NITRATE UR QL: NEGATIVE
NRBC # BLD AUTO: 0 10*3/UL
NRBC BLD AUTO-RTO: 0 /100
PH UR STRIP: 7 PH (ref 5–7)
PLATELET # BLD AUTO: 201 10E9/L (ref 150–450)
POTASSIUM SERPL-SCNC: 3.5 MMOL/L (ref 3.4–5.3)
PROT SERPL-MCNC: 7.6 G/DL (ref 6.8–8.8)
RBC # BLD AUTO: 5.38 10E12/L (ref 4.4–5.9)
RBC #/AREA URNS AUTO: 1 /HPF (ref 0–2)
SODIUM SERPL-SCNC: 139 MMOL/L (ref 133–144)
SOURCE: ABNORMAL
SP GR UR STRIP: 1.03 (ref 1–1.03)
SPECIMEN SOURCE: NORMAL
SQUAMOUS #/AREA URNS AUTO: <1 /HPF (ref 0–1)
TROPONIN I SERPL-MCNC: <0.015 UG/L (ref 0–0.04)
UROBILINOGEN UR STRIP-MCNC: 0 MG/DL (ref 0–2)
WBC # BLD AUTO: 15.2 10E9/L (ref 4–11)
WBC #/AREA URNS AUTO: 5 /HPF (ref 0–5)

## 2020-02-04 PROCEDURE — 25000128 H RX IP 250 OP 636: Performed by: FAMILY MEDICINE

## 2020-02-04 PROCEDURE — 83605 ASSAY OF LACTIC ACID: CPT | Performed by: FAMILY MEDICINE

## 2020-02-04 PROCEDURE — 85379 FIBRIN DEGRADATION QUANT: CPT | Performed by: FAMILY MEDICINE

## 2020-02-04 PROCEDURE — 81001 URINALYSIS AUTO W/SCOPE: CPT | Performed by: FAMILY MEDICINE

## 2020-02-04 PROCEDURE — 71275 CT ANGIOGRAPHY CHEST: CPT

## 2020-02-04 PROCEDURE — 86140 C-REACTIVE PROTEIN: CPT | Performed by: FAMILY MEDICINE

## 2020-02-04 PROCEDURE — 25000132 ZZH RX MED GY IP 250 OP 250 PS 637: Performed by: FAMILY MEDICINE

## 2020-02-04 PROCEDURE — 93005 ELECTROCARDIOGRAM TRACING: CPT | Performed by: FAMILY MEDICINE

## 2020-02-04 PROCEDURE — 99285 EMERGENCY DEPT VISIT HI MDM: CPT | Mod: 25 | Performed by: FAMILY MEDICINE

## 2020-02-04 PROCEDURE — 84484 ASSAY OF TROPONIN QUANT: CPT | Performed by: FAMILY MEDICINE

## 2020-02-04 PROCEDURE — 96360 HYDRATION IV INFUSION INIT: CPT | Mod: 59 | Performed by: FAMILY MEDICINE

## 2020-02-04 PROCEDURE — 85610 PROTHROMBIN TIME: CPT | Performed by: FAMILY MEDICINE

## 2020-02-04 PROCEDURE — 80053 COMPREHEN METABOLIC PANEL: CPT | Performed by: FAMILY MEDICINE

## 2020-02-04 PROCEDURE — 25000125 ZZHC RX 250: Performed by: FAMILY MEDICINE

## 2020-02-04 PROCEDURE — 83690 ASSAY OF LIPASE: CPT | Performed by: FAMILY MEDICINE

## 2020-02-04 PROCEDURE — 85025 COMPLETE CBC W/AUTO DIFF WBC: CPT | Performed by: FAMILY MEDICINE

## 2020-02-04 PROCEDURE — 93010 ELECTROCARDIOGRAM REPORT: CPT | Mod: Z6 | Performed by: FAMILY MEDICINE

## 2020-02-04 PROCEDURE — 25800030 ZZH RX IP 258 OP 636: Performed by: FAMILY MEDICINE

## 2020-02-04 PROCEDURE — 87804 INFLUENZA ASSAY W/OPTIC: CPT | Performed by: FAMILY MEDICINE

## 2020-02-04 PROCEDURE — 96361 HYDRATE IV INFUSION ADD-ON: CPT | Performed by: FAMILY MEDICINE

## 2020-02-04 RX ORDER — SODIUM CHLORIDE, SODIUM LACTATE, POTASSIUM CHLORIDE, CALCIUM CHLORIDE 600; 310; 30; 20 MG/100ML; MG/100ML; MG/100ML; MG/100ML
1000 INJECTION, SOLUTION INTRAVENOUS CONTINUOUS
Status: DISCONTINUED | OUTPATIENT
Start: 2020-02-04 | End: 2020-02-04 | Stop reason: HOSPADM

## 2020-02-04 RX ORDER — IOPAMIDOL 755 MG/ML
70 INJECTION, SOLUTION INTRAVASCULAR ONCE
Status: COMPLETED | OUTPATIENT
Start: 2020-02-04 | End: 2020-02-04

## 2020-02-04 RX ADMIN — IOPAMIDOL 70 ML: 755 INJECTION, SOLUTION INTRAVENOUS at 12:03

## 2020-02-04 RX ADMIN — SODIUM CHLORIDE 100 ML: 9 INJECTION, SOLUTION INTRAVENOUS at 12:03

## 2020-02-04 RX ADMIN — LIDOCAINE HYDROCHLORIDE 30 ML: 20 SOLUTION ORAL; TOPICAL at 11:02

## 2020-02-04 RX ADMIN — SODIUM CHLORIDE, POTASSIUM CHLORIDE, SODIUM LACTATE AND CALCIUM CHLORIDE 1000 ML: 600; 310; 30; 20 INJECTION, SOLUTION INTRAVENOUS at 10:50

## 2020-02-04 RX ADMIN — SODIUM CHLORIDE, POTASSIUM CHLORIDE, SODIUM LACTATE AND CALCIUM CHLORIDE 1000 ML: 600; 310; 30; 20 INJECTION, SOLUTION INTRAVENOUS at 13:09

## 2020-02-04 NOTE — ED AVS SNAPSHOT
[facility] Emergency Department  [address] [facility] [address]  Phone:  [phone]  Fax:  [phone]                                    Dann Castillo   MRN: [id number]    Department:  [facility] Emergency Department   Date of Visit:  2/4/2020           After Visit Summary Signature Page    I have received my discharge instructions, and my questions have been answered. I have discussed any challenges I see with this plan with the nurse or doctor.    ..........................................................................................................................................  Patient/Patient Representative Signature      ..........................................................................................................................................  Patient Representative Print Name and Relationship to Patient    ..................................................               ................................................  Date                                   Time    ..........................................................................................................................................  Reviewed by Signature/Title    ...................................................              ..............................................  Date                                               Time          22EPIC Rev 08/18

## 2020-02-04 NOTE — DISCHARGE INSTRUCTIONS
Tylenol 1 g every 4 hours as needed for pain.  Maximum 4 g in 24 hours.  Sling for immobilization of the left arm for the next 48 to 72 hours to assess impact on discomfort.  Alternating warm pack and cold pack to the chest wall over the next 48 hours while awake.  Return to the emergency department if worse or changes otherwise follow-up in clinic with your primary care provider in the next 10 to 14 days.

## 2020-02-04 NOTE — TELEPHONE ENCOUNTER
Reason for call:  Patient reporting a symptom    Symptom or request: Chest pain Left Shoulder  Started Sunday      Phone Number patient can be reached at:  Home number on file [phone] (home)    Best Time:  Any Time      Can we leave a detailed message on this number:  YES    Call taken on 2/4/2020 at 8:18 AM by Yady Schmitt

## 2020-02-04 NOTE — ED PROVIDER NOTES
History     Chief Complaint   Patient presents with     Chest Pain     HPI  Ed Jonathan is a 49 year old male, past medical history is significant for postconcussion syndrome, depression, presents to the emergency department with concerns of chest pain.  History is obtained from the patient who presents with his wife who is a nurse.  The patient states that he began with chest pain at rest central to slightly left chest with radiation into the left shoulder 3 days prior to presentation.  It was initially mild although dull achy with an occasional sharp shooting component and it has progressively gotten worse.  He was initially not sure if there was any change with movement of the chest or arm, he thinks now that there probably is some worsening with movement of the left arm.  Seems worse when he is lying down or sitting up, is better when he is standing and is better with ambulation.  He notices some shortness of breath, discomfort with attempts at inhalation but better with exhalation.  No fever chills or sweats.  His wife notes that he has had a cough for the last 4 months for which they have tried over-the-counter medication and he has not presented for medical evaluation for this.  He has a history of bilateral rotator cuff surgery, this does not feel like his usual shoulder pain that he associates with previous shoulder issues.  He notes no nausea or vomiting.  No palpitations.  Perhaps slightly lightheaded at times.  He tried some Tylenol for the discomfort and it did not help yesterday.  He has not noticed any night sweats or weight changes.  He does notice some burping and belching throughout the course of yesterday, much more than usual.  More heartburn symptoms by his account than usual.    Allergies:  Allergies   Allergen Reactions     Peanuts [Nuts] Hives     And the oil       Problem List:    Patient Active Problem List    Diagnosis Date Noted     Postconcussion syndrome 03/25/2016     Priority: Medium      Saw Dr Morillo at [facility] on 3/22/16 - MRI brain and neck, starting elavil 25, vestibular therapy, ongoing Physical Therapy, recheck 4-6 wks.  Note sent to scanning.       Shoulder joint pain, unspecified laterality 02/16/2016     Priority: Medium     Saw [facility] 1/21/16 - impingement syndrome and adhesive capsulitis both shoulders, recommended re-trying cortisone shots.         CARDIOVASCULAR SCREENING; LDL GOAL LESS THAN 160 10/31/2010     Priority: Medium     Mild major depression (H) 08/03/2007     Priority: Medium        Past Medical History:    No past medical history on file.    Past Surgical History:    Past Surgical History:   Procedure Laterality Date     AMPUTATE FINGER(S) Left 2/2/2018    Procedure: AMPUTATE FINGER(S);  Left long finger revision amputation.;  Surgeon: Denise Lyle MD;  Location: [facility] OR     ARTHROTOMY SHOULDER, ROTATOR CUFF REPAIR, COMBINED Right 5/2/2016    Procedure: COMBINED ARTHROTOMY SHOULDER, ROTATOR CUFF REPAIR;  Surgeon: Rafael Pacheco MD;  Location: [facility] OR     ARTHROTOMY SHOULDER, SUBACROMIAL DECOMPRESSION, COMBINED Left 6/13/2016    Procedure: COMBINED ARTHROTOMY SHOULDER, SUBACROMIAL DECOMPRESSION;  Surgeon: Rafael Pacheco MD;  Location: [facility] OR     DESTRUCTION OF PARAVERTEBRAL FACETCERVICAL / THORACIC SINGLE Right 8/25/2017    Procedure: DESTRUCTION OF PARAVERTEBRAL FACET CERVICAL / THORACIC SINGLE;  Right Radiofrequency Ablation of the Cervical 3rd occipital nerve, C3. 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      HC FULL THICK GRAFT HEAD/AXIL/GEN/HND/FT <=20 CM  8/31/2003    Full thickness skin graft to the donor site of the cross finger flap      SURGICAL HISTORY OF -   9/22/2003    Division of left cross finger flap     SURGICAL HISTORY OF -   2003    left ACL repair  and partial menisectomy       Family History:    Family History   Problem Relation Age of Onset     Heart Disease Mother      Diabetes Father      Cerebrovascular Disease Father      Colon Cancer Father      Depression Sister      Obesity Sister      Obesity Sister        Social History:  Marital Status:   [2]  Social History     Tobacco Use     Smoking status: Former Smoker     Packs/day: 1.50     Years: 17.00     Pack years: 25.50     Last attempt to quit: 2005     Years since quittin.6     Smokeless tobacco: Never Used     Tobacco comment: around second hand smoke daily   Substance Use Topics     Alcohol use: Yes     Comment: occ     Drug use: No        Medications:    Acetaminophen (TYLENOL 8 HOUR ARTHRITIS PAIN PO)  albuterol (PROAIR HFA/PROVENTIL HFA/VENTOLIN HFA) 108 (90 Base) MCG/ACT inhaler  ANUCORT-HC 25 MG suppository  cetirizine (ZYRTEC) 10 MG tablet  EPINEPHrine (EPIPEN/ADRENACLICK/OR ANY BX GENERIC EQUIV) 0.3 MG/0.3ML injection 2-pack  fexofenadine (ALLEGRA) 180 MG tablet  Lidocaine-Hydrocortisone Ace (RECTACREME HC RE)          Review of Systems   All other systems reviewed and are negative.      Physical Exam   BP: (!) 141/96  Pulse: 108  Heart Rate: 98  Temp: 98  F (36.7  C)  Resp: 16  Weight: 77.6 kg (171 lb)  SpO2: 99 %      Physical Exam  Vitals signs and nursing note reviewed.   Constitutional:       Appearance: He is well-developed and normal weight.   HENT:      Head: Normocephalic and atraumatic.   Eyes:      Extraocular Movements: Extraocular movements intact.      Pupils: Pupils are equal, round, and reactive to light.   Neck:      Musculoskeletal: Normal range of motion and neck supple.   Cardiovascular:      Rate and Rhythm: Normal rate and regular rhythm.      Heart sounds: Normal heart sounds.   Pulmonary:      Effort: Pulmonary effort is normal.      Breath sounds: Normal breath sounds.   Chest:      Chest wall: Tenderness present.       Musculoskeletal: Normal range  of motion.        Arms:    Skin:     General: Skin is warm.      Capillary Refill: Capillary refill takes less than 2 seconds.   Neurological:      General: No focal deficit present.      Mental Status: He is alert.   Psychiatric:         Mood and Affect: Mood normal.         Behavior: Behavior normal.         ED Course        Procedures               EKG Interpretation:      Interpreted by Franklin Gonzalez MD  Time reviewed: Time obtained 1024 time interpreted 1026.  Sinus tachycardia 101 bpm no acute ST-T wave changes.  Normal axis, normal intervals.  Impression sinus tachycardia.        Critical Care time:  none               Results for orders placed or performed during the hospital encounter of 02/04/20 (from the past 24 hour(s))   Lactic acid whole blood   Result Value Ref Range    Lactic Acid 2.8 (H) 0.7 - 2.0 mmol/L   CBC with platelets differential   Result Value Ref Range    WBC 15.2 (H) 4.0 - 11.0 10e9/L    RBC Count 5.38 4.4 - 5.9 10e12/L    Hemoglobin 16.2 13.3 - 17.7 g/dL    Hematocrit 47.3 40.0 - 53.0 %    MCV 88 78 - 100 fl    MCH 30.1 26.5 - 33.0 pg    MCHC 34.2 31.5 - 36.5 g/dL    RDW 11.9 10.0 - 15.0 %    Platelet Count 201 150 - 450 10e9/L    Diff Method Automated Method     % Neutrophils 73.0 %    % Lymphocytes 21.7 %    % Monocytes 3.8 %    % Eosinophils 0.8 %    % Basophils 0.4 %    % Immature Granulocytes 0.3 %    Nucleated RBCs 0 0 /100    Absolute Neutrophil 11.1 (H) 1.6 - 8.3 10e9/L    Absolute Lymphocytes 3.3 0.8 - 5.3 10e9/L    Absolute Monocytes 0.6 0.0 - 1.3 10e9/L    Absolute Eosinophils 0.1 0.0 - 0.7 10e9/L    Absolute Basophils 0.1 0.0 - 0.2 10e9/L    Abs Immature Granulocytes 0.1 0 - 0.4 10e9/L    Absolute Nucleated RBC 0.0    CRP inflammation   Result Value Ref Range    CRP Inflammation 7.2 0.0 - 8.0 mg/L   INR   Result Value Ref Range    INR 1.02 0.86 - 1.14   D dimer quantitative   Result Value Ref Range    D Dimer <0.3 0.0 - 0.50 ug/ml FEU   Comprehensive metabolic panel    Result Value Ref Range    Sodium 139 133 - 144 mmol/L    Potassium 3.5 3.4 - 5.3 mmol/L    Chloride 108 94 - 109 mmol/L    Carbon Dioxide 24 20 - 32 mmol/L    Anion Gap 7 3 - 14 mmol/L    Glucose 135 (H) 70 - 99 mg/dL    Urea Nitrogen 12 7 - 30 mg/dL    Creatinine 0.78 0.66 - 1.25 mg/dL    GFR Estimate >90 >60 mL/min/[1.73_m2]    GFR Estimate If Black >90 >60 mL/min/[1.73_m2]    Calcium 8.9 8.5 - 10.1 mg/dL    Bilirubin Total 0.5 0.2 - 1.3 mg/dL    Albumin 4.0 3.4 - 5.0 g/dL    Protein Total 7.6 6.8 - 8.8 g/dL    Alkaline Phosphatase 71 40 - 150 U/L    ALT 30 0 - 70 U/L    AST 24 0 - 45 U/L   Lipase   Result Value Ref Range    Lipase 195 73 - 393 U/L   Troponin I   Result Value Ref Range    Troponin I ES <0.015 0.000 - 0.045 ug/L   CT Chest Pulmonary Embolism w Contrast    Narrative    CT CHEST PULMONARY EMBOLISM WITH CONTRAST   2/4/2020 12:24 PM    HISTORY: Atypical chest pain. Leukocytosis.    TECHNIQUE: Pulmonary embolism protocol was performed. 70 mL Isovue-370  were injected IV. After contrast injection, volumetric helical  acquisition was performed from the thoracic inlet through the upper  abdomen. Radiation dose for this scan was reduced using automated  exposure control, adjustment of the mA and/or kV according to patient  size, or iterative reconstruction technique.    COMPARISON: None.    FINDINGS: No evidence for pulmonary embolism. The thoracic aorta is of  normal caliber, without evidence for thoracic aortic aneurysm or  dissection. No pleural or pericardial effusions. No enlarged lymph  nodes are identified in the chest. The lungs are clear. Limited views  of the upper abdomen are unremarkable.       Impression    IMPRESSION:   1. No evidence for pulmonary embolism or thoracic aortic dissection.  2. No definite cause for chest pain is identified.    IDANIA HENRY MD   Influenza A/B antigen   Result Value Ref Range    Influenza A/B Agn Specimen Nasopharyngeal     Influenza A Negative NEG^Negative     Influenza B Negative NEG^Negative   Lactic acid whole blood   Result Value Ref Range    Lactic Acid 2.5 (H) 0.7 - 2.0 mmol/L   UA reflex to Microscopic   Result Value Ref Range    Color Urine Yellow     Appearance Urine Cloudy     Glucose Urine Negative NEG^Negative mg/dL    Bilirubin Urine Negative NEG^Negative    Ketones Urine Negative NEG^Negative mg/dL    Specific Gravity Urine 1.030 1.003 - 1.035    Blood Urine Negative NEG^Negative    pH Urine 7.0 5.0 - 7.0 pH    Protein Albumin Urine Negative NEG^Negative mg/dL    Urobilinogen mg/dL 0.0 0.0 - 2.0 mg/dL    Nitrite Urine Negative NEG^Negative    Leukocyte Esterase Urine Negative NEG^Negative    Source Midstream Urine     RBC Urine 1 0 - 2 /HPF    WBC Urine 5 0 - 5 /HPF    Squamous Epithelial /HPF Urine <1 0 - 1 /HPF    Amorphous Crystals Moderate (A) NEG^Negative /HPF       Medications   lactated ringers BOLUS 1,000 mL (0 mLs Intravenous Stopped 2/4/20 1308)     Followed by   lactated ringers infusion (has no administration in time range)   lidocaine (XYLOCAINE) 2 % 15 mL, alum & mag hydroxide-simethicone (MYLANTA ES/MAALOX  ES) 15 mL GI Cocktail (30 mLs Oral Given 2/4/20 1102)   lactated ringers BOLUS 1,000 mL (0 mLs Intravenous Stopped 2/4/20 1420)   iopamidol (ISOVUE-370) solution 70 mL (70 mLs Intravenous Given 2/4/20 1203)   sodium chloride 0.9 % bag 500mL for CT scan flush use (100 mLs As instructed Given 2/4/20 1203)   3:59 PM  No response to the GI cocktail.  Some improvement of discomfort and now he definitely notices with more movement of his left shoulder.  We reviewed his negative EKG, negative cardiac troponin, elevated white cell count, normal CRP, negative UA, negative influenza, mildly elevated lactate at 2.8 which decreases down to 2.5 after 2 L of fluid.  The patient is hungry, he has been able to drink liquids and would like to eat.  He does not appear ill at this time.  I think that his elevated lactate relates to a significant component  of dehydration.  He is afebrile, his tachycardia is improved with fluids, no fever at any point.  His work-up which has been very comprehensive includes consideration for ACS, thoracic dissection, pulmonary embolism, atypical pneumonia, spontaneous pneumothorax, pleuritis/pericarditis, pericardial effusion/tamponade, chest wall pain, GI etiology pain.  No evidence for ACS, thoracic dissection, PE, atypical pneumonia, spontaneous pneumothorax, pleuritis/pericarditis, pericardial effusion/tamponade.  Urinalysis is additionally negative although symptoms certainly would be very atypical for a UTI.  On exam initially the patient did have chest wall pain and certainly there is a component with movement of his left shoulder that affects the chest pain.  He has a significant bilateral rotator cuff repair history but feels that the pain is different than his rotator cuff pain previously.  Historically describes some heartburn but had no response to a GI cocktail.  At this point I do not find a concerning explanation for his discomfort.  I have asked him to rest the left arm in a sling next 48 to 72 hours.  Carefully observe the pain.  Nonnarcotic and non-NSAID class medication for treatment of his pain.       Assessments & Plan (with Medical Decision Making)   Assessments and plan with medical decision making at the time stamp above.  Disposition to home.    Disclaimer: This note consists of symbols derived from keyboarding, dictation and/or voice recognition software. As a result, there may be errors in the script that have gone undetected. Please consider this when interpreting information found in this chart.        I have reviewed the nursing notes.    I have reviewed the findings, diagnosis, plan and need for follow up with the patient.       New Prescriptions    No medications on file       Final diagnoses:   Atypical chest pain - Noncardiac   Chest wall pain       2/4/2020   [facility] EMERGENCY DEPARTMENT      Franklin Gonzalez MD  02/04/20 0230       Franklin Gonzalez MD  02/04/20 [id number]

## 2020-02-04 NOTE — TELEPHONE ENCOUNTER
"Ed Jonathan is a 49 year old male who calls with chest pain.     PRESENTING PROBLEM:  Chest pain and left shoulder pain.  Hurts where rotator cuff.    NURSING ASSESSMENT:  Patient complains of chest pain, palpitations and irregular heart beats.  Onset:  2 days  Pain is characterized as pressure and the the \"feeling you get when you swallow and it won't go down\"     Severity 8 out of 10, moderate and off and on  Located: mid upper chest   Radiates to left rib cage.  Duration fleeting.  Associated symptoms nausea, lightheadedness, feeling faint and cough.  Exacerbated by movement.  Relieved by sitting.  Cardiac risk factors: none and former smoker.  Associated Medical History: depression  Allergies:   Allergies   Allergen Reactions     Peanuts [Nuts] Hives     And the oil       MEDICATIONS:  Taking medication(s) as prescribed? Yes  Taking over the counter medication(s) ?No  Any medication side effects? Not Applicable    Any barriers to taking medication(s) as prescribed?  No  Medication(s) improving/managing symptoms?  N/A  Medication reconciliation completed: No    Last exam/Treatment:  > 6 months ago    NURSING PLAN: Nursing advice to patient advised to go to the ED    RECOMMENDED DISPOSITION:  To ED, another person to drive  Will comply with recommendation: Yes  If further questions/concerns or if symptoms do not improve, worsen or new symptoms develop, call your PCP or [facility] as soon as possible.        Keiry Nunes RN    "

## 2020-02-04 NOTE — ED NOTES
DATE:  2/4/2020   TIME OF RECEIPT FROM LAB:  11:00 AM  LAB TEST:  lactic  LAB VALUE:  2.8  RESULTS GIVEN WITH READ-BACK TO (PROVIDER):  Dr. Gonzalez notified   TIME LAB VALUE REPORTED TO PROVIDER:   11:00 AM

## 2020-02-14 ENCOUNTER — OFFICE VISIT (OUTPATIENT)
Dept: FAMILY MEDICINE | Facility: CLINIC | Age: 50
End: 2020-02-14
Payer: COMMERCIAL

## 2020-02-14 VITALS
WEIGHT: 163.4 LBS | DIASTOLIC BLOOD PRESSURE: 84 MMHG | OXYGEN SATURATION: 100 % | TEMPERATURE: 98.6 F | HEIGHT: 66 IN | SYSTOLIC BLOOD PRESSURE: 126 MMHG | RESPIRATION RATE: 18 BRPM | BODY MASS INDEX: 26.26 KG/M2 | HEART RATE: 97 BPM

## 2020-02-14 DIAGNOSIS — R07.89 CHEST WALL PAIN: ICD-10-CM

## 2020-02-14 DIAGNOSIS — M94.0 COSTOCHONDRITIS: Primary | ICD-10-CM

## 2020-02-14 DIAGNOSIS — F39 MOOD DISORDER (H): ICD-10-CM

## 2020-02-14 PROCEDURE — 99214 OFFICE O/P EST MOD 30 MIN: CPT | Performed by: PHYSICIAN ASSISTANT

## 2020-02-14 RX ORDER — NAPROXEN SODIUM 220 MG
220-440 TABLET ORAL 2 TIMES DAILY WITH MEALS
Qty: 56 TABLET | Refills: 0 | Status: SHIPPED | OUTPATIENT
Start: 2020-02-14 | End: 2021-12-30

## 2020-02-14 ASSESSMENT — ANXIETY QUESTIONNAIRES
GAD7 TOTAL SCORE: 9
GAD7 TOTAL SCORE: 9
5. BEING SO RESTLESS THAT IT IS HARD TO SIT STILL: NOT AT ALL
6. BECOMING EASILY ANNOYED OR IRRITABLE: MORE THAN HALF THE DAYS
7. FEELING AFRAID AS IF SOMETHING AWFUL MIGHT HAPPEN: NOT AT ALL
2. NOT BEING ABLE TO STOP OR CONTROL WORRYING: MORE THAN HALF THE DAYS
1. FEELING NERVOUS, ANXIOUS, OR ON EDGE: NOT AT ALL
3. WORRYING TOO MUCH ABOUT DIFFERENT THINGS: NEARLY EVERY DAY
GAD7 TOTAL SCORE: 9
7. FEELING AFRAID AS IF SOMETHING AWFUL MIGHT HAPPEN: NOT AT ALL
4. TROUBLE RELAXING: MORE THAN HALF THE DAYS

## 2020-02-14 ASSESSMENT — MIFFLIN-ST. JEOR: SCORE: 1554.96

## 2020-02-14 ASSESSMENT — PATIENT HEALTH QUESTIONNAIRE - PHQ9
SUM OF ALL RESPONSES TO PHQ QUESTIONS 1-9: 14
SUM OF ALL RESPONSES TO PHQ QUESTIONS 1-9: 14
10. IF YOU CHECKED OFF ANY PROBLEMS, HOW DIFFICULT HAVE THESE PROBLEMS MADE IT FOR YOU TO DO YOUR WORK, TAKE CARE OF THINGS AT HOME, OR GET ALONG WITH OTHER PEOPLE: SOMEWHAT DIFFICULT

## 2020-02-14 ASSESSMENT — PAIN SCALES - GENERAL: PAINLEVEL: EXTREME PAIN (9)

## 2020-02-14 NOTE — PROGRESS NOTES
Subjective     Ed Jonathan is a 49 year old male who presents to clinic today for the following health issues:    HPI   ED/UC Followup:    Facility:  [facility]  Date of visit: 02/04/2020  Reason for visit: Chest Pain  Current Status: Patient states that he is still having the pain the same as when he went to the er. Patient states that it comes and goes and is worse in the evening    ED visit after several days of CP.  Started mid chest and radiated to L shoulder.  Became severe that day.  Pain still there  Worse with laying on that shoulder  Still has his chronic L shoulder pain.  Sorry he did previous surgery.  Really hurts when he breaths  No cough  Not affected by eating    QUANG-7   [employer], 2002; Used with Permission) 2/14/2020   1. Feeling nervous, anxious, or on edge Not at all   2. Not being able to stop or control worrying More than half the days   3. Worrying too much about different things Nearly every day   4. Trouble relaxing More than half the days   5. Being so restless that it is hard to sit still Not at all   6. Becoming easily annoyed or irritable More than half the days   7. Feeling afraid, as if something awful might happen Not at all   QUANG 7 TOTAL SCORE 9 (mild anxiety)     PHQ-9 ([employer]) 2/14/2020   1.  Little interest or pleasure in doing things More than half the days   2.  Feeling down, depressed, or hopeless Not at all   3.  Trouble falling or staying asleep, or sleeping too much Nearly every day   4.  Feeling tired or having little energy Several days   5.  Poor appetite or overeating Nearly every day   6.  Feeling bad about yourself Not at all   7.  Trouble concentrating Nearly every day   8.  Moving slowly or restless More than half the days   9.  Suicidal or self-harm thoughts Not at all   PHQ-9 via [employer] [place] TOTAL SCORE-----> 14 (Moderate depression)     A lot of stress with wife's health.  Didn't like feeling in past.  Wellbutrin, nortriptyline, prozac, cymbalta,  "celexa.  Reluctant to consider counseling    BP Readings from Last 3 Encounters:   02/14/20 126/84   02/04/20 106/87   09/05/19 122/64    Wt Readings from Last 3 Encounters:   02/14/20 74.1 kg (163 lb 6.4 oz)   02/04/20 77.6 kg (171 lb)   09/05/19 77.6 kg (171 lb)         Reviewed and updated as needed this visit by Provider  Tobacco  Allergies  Meds  Problems  Med Hx  Surg Hx  Fam Hx         Review of Systems   ROS COMP: Constitutional, cardiovascular, pulmonary, musculoskeletal, and psych systems are negative, except as otherwise noted.      Objective    /84 (BP Location: Right arm, Patient Position: Sitting, Cuff Size: Adult Large)   Pulse 97   Temp 98.6  F (37  C) (Tympanic)   Resp 18   Ht 1.67 m (5' 5.75\")   Wt 74.1 kg (163 lb 6.4 oz)   SpO2 100%   BMI 26.57 kg/m    Body mass index is 26.57 kg/m .  Physical Exam   GENERAL: healthy, alert and no distress  RESP: lungs clear to auscultation - no rales, rhonchi or wheezes  CV: regular rate and rhythm, normal S1 S2, no S3 or S4, no murmur, click or rub, tender over L mid chest costochondral border, no tenderness elsewhere  PSYCH: mentation appears normal, affect somewhat tired, reserved    Diagnostic Test Results:  Labs reviewed in Epic        Assessment & Plan       ICD-10-CM    1. Costochondritis M94.0 SPORTS MEDICINE REFERRAL     naproxen sodium (ANAPROX) 220 MG tablet   2. Chest wall pain R07.89 SPORTS MEDICINE REFERRAL     naproxen sodium (ANAPROX) 220 MG tablet   3. Mood disorder (H) F39        Patient Instructions   Try naproxen twice daily for 2 weeks  If not doing well enough, we can try switching to other similar medication  If really bothering and not doing well enough, see sports medicine - referral placed    Recommend counseling - let me know if you want referral      No follow-ups on file.    Abigail Gregory PA-C  [facility]      "

## 2020-02-14 NOTE — NURSING NOTE
"Chief Complaint   Patient presents with     ER F/U       Initial /84 (BP Location: Right arm, Patient Position: Sitting, Cuff Size: Adult Large)   Pulse 97   Temp 98.6  F (37  C) (Tympanic)   Resp 18   Ht 1.67 m (5' 5.75\")   Wt 74.1 kg (163 lb 6.4 oz)   SpO2 100%   BMI 26.57 kg/m   Estimated body mass index is 26.57 kg/m  as calculated from the following:    Height as of this encounter: 1.67 m (5' 5.75\").    Weight as of this encounter: 74.1 kg (163 lb 6.4 oz).    Patient presents to the clinic using No DME    Health Maintenance that is potentially due pending provider review:  NONE    n/a    Is there anyone who you would like to be able to receive your results? No  If yes have patient fill out SILVIO    Patria Maldonado CMA    "

## 2020-02-15 ASSESSMENT — PATIENT HEALTH QUESTIONNAIRE - PHQ9: SUM OF ALL RESPONSES TO PHQ QUESTIONS 1-9: 14

## 2020-02-15 ASSESSMENT — ANXIETY QUESTIONNAIRES: GAD7 TOTAL SCORE: 9

## 2020-03-25 ENCOUNTER — TRANSFERRED RECORDS (OUTPATIENT)
Dept: HEALTH INFORMATION MANAGEMENT | Facility: CLINIC | Age: 50
End: 2020-03-25

## 2020-11-12 ENCOUNTER — VIRTUAL VISIT (OUTPATIENT)
Dept: FAMILY MEDICINE | Facility: CLINIC | Age: 50
End: 2020-11-12
Payer: COMMERCIAL

## 2020-11-12 DIAGNOSIS — Z91.010 PEANUT ALLERGY: ICD-10-CM

## 2020-11-12 DIAGNOSIS — Z20.822 ENCOUNTER FOR LABORATORY TESTING FOR COVID-19 VIRUS: ICD-10-CM

## 2020-11-12 DIAGNOSIS — M25.512 PAIN OF BOTH SHOULDER JOINTS: ICD-10-CM

## 2020-11-12 DIAGNOSIS — M25.511 PAIN OF BOTH SHOULDER JOINTS: ICD-10-CM

## 2020-11-12 DIAGNOSIS — J30.2 SEASONAL ALLERGIC RHINITIS, UNSPECIFIED TRIGGER: ICD-10-CM

## 2020-11-12 DIAGNOSIS — Z87.09 HISTORY OF ASTHMA: ICD-10-CM

## 2020-11-12 DIAGNOSIS — Z78.9 PATIENT TRAVELS: Primary | ICD-10-CM

## 2020-11-12 PROCEDURE — 99214 OFFICE O/P EST MOD 30 MIN: CPT | Mod: TEL | Performed by: PHYSICIAN ASSISTANT

## 2020-11-12 RX ORDER — EPINEPHRINE 0.3 MG/.3ML
0.3 INJECTION SUBCUTANEOUS
Qty: 0.6 ML | Refills: 0 | Status: SHIPPED | OUTPATIENT
Start: 2020-11-12

## 2020-11-12 RX ORDER — ALBUTEROL SULFATE 90 UG/1
2 AEROSOL, METERED RESPIRATORY (INHALATION) EVERY 6 HOURS PRN
Qty: 1 INHALER | Refills: 0 | Status: SHIPPED | OUTPATIENT
Start: 2020-11-12 | End: 2021-02-26

## 2020-11-12 RX ORDER — NORTRIPTYLINE HCL 25 MG
25 CAPSULE ORAL AT BEDTIME
Qty: 120 CAPSULE | Refills: 3 | Status: SHIPPED | OUTPATIENT
Start: 2020-11-12 | End: 2021-02-26

## 2020-11-12 ASSESSMENT — PATIENT HEALTH QUESTIONNAIRE - PHQ9: SUM OF ALL RESPONSES TO PHQ QUESTIONS 1-9: 2

## 2020-11-12 NOTE — PROGRESS NOTES
"Ed Jonathan is a 50 year old male who is being evaluated via a billable telephone visit.      The patient has been notified of following:     \"This telephone visit will be conducted via a call between you and your physician/provider. We have found that certain health care needs can be provided without the need for a physical exam.  This service lets us provide the care you need with a short phone conversation.  If a prescription is necessary we can send it directly to your pharmacy.  If lab work is needed we can place an order for that and you can then stop by our lab to have the test done at a later time.    Telephone visits are billed at different rates depending on your insurance coverage. During this emergency period, for some insurers they may be billed the same as an in-person visit.  Please reach out to your insurance provider with any questions.    If during the course of the call the physician/provider feels a telephone visit is not appropriate, you will not be charged for this service.\"    Patient has given verbal consent for Telephone visit?  Yes    What phone number would you like to be contacted at? [phone]    How would you like to obtain your AVS? Jatin    Subjective     Ed Jonathan is a 50 year old male who presents via phone visit today for the following health issues:    HPI       * Covid-19 test:  Needs a test showing he is negative for COVID 72 hours before traveling to home to [facility] on 11/25.  Will need a written letter with his lab results to show proof.    * Medication refill- Albuterol, states needs refilled for his allergies   Usually issues with pollen and humidity.  Figures will be pollen season in [facility].  Takes both zyrtec and   Peanut allergy, epi pen, keeps in lunch bag.  Hasn't needed to use.    Still shoulder pain - 2 aleve in morning, 1 after lunch.  Still really hurts by end of day.  Tylenol doesn't help.  Has tried voltaren, mobic, relafen, cymbalta, and amitriptyline " for back.  Feels shoulder cracks.      Back doing fine.  Did Physical Therapy.  Sometimes muscle spasm.    Review of Systems   Constitutional, HEENT, cardiovascular, pulmonary, GI, , musculoskeletal, neuro, skin, endocrine and psych systems are negative, except as otherwise noted.       Objective          Vitals:  No vitals were obtained today due to virtual visit.    healthy, alert and no distress  PSYCH: Alert and oriented times 3; coherent speech, normal   rate and volume, able to articulate logical thoughts, able   to abstract reason, no tangential thoughts, no hallucinations   or delusions  His affect is normal  RESP: No cough, no audible wheezing, able to talk in full sentences  Remainder of exam unable to be completed due to telephone visits          Assessment/Plan:    Assessment & Plan     Ed was seen today for covid 19 testing.    Diagnoses and all orders for this visit:    Patient travels  -     Asymptomatic COVID-19 Virus (Coronavirus) by PCR; Future    Pain of both shoulder joints  -     nortriptyline (PAMELOR) 25 MG capsule; Take 1 capsule (25 mg) by mouth At Bedtime Can increase by 1 cap every 3 days to max 4 caps nightly.  For shoulder pain.    Seasonal allergic rhinitis, unspecified trigger  -     albuterol (PROAIR HFA/PROVENTIL HFA/VENTOLIN HFA) 108 (90 Base) MCG/ACT inhaler; Inhale 2 puffs into the lungs every 6 hours as needed for shortness of breath / dyspnea or wheezing    History of asthma  -     albuterol (PROAIR HFA/PROVENTIL HFA/VENTOLIN HFA) 108 (90 Base) MCG/ACT inhaler; Inhale 2 puffs into the lungs every 6 hours as needed for shortness of breath / dyspnea or wheezing    Peanut allergy  -     EPINEPHrine (ANY BX GENERIC EQUIV) 0.3 MG/0.3ML injection 2-pack; Inject 0.3 mLs (0.3 mg) into the muscle once as needed for anaphylaxis    Encounter for laboratory testing for COVID-19 virus  -     Asymptomatic COVID-19 Virus (Coronavirus) by PCR; Future           Patient Instructions   Try  nortriptyline for shoulder pain    covid testing ordered    meds refilled      Return if symptoms worsen or fail to improve.    Abigail Gregory PA-C  [facility]    Phone call duration: 18 minutes

## 2020-11-13 ENCOUNTER — TELEPHONE (OUTPATIENT)
Dept: FAMILY MEDICINE | Facility: CLINIC | Age: 50
End: 2020-11-13

## 2020-11-13 NOTE — TELEPHONE ENCOUNTER
He needs this done and documented by 11/25/20.  COVID order is pended  He is aware that Abigail Gregory PA-C is out of the office until 11/18/20.  Keiry Nunes RN

## 2020-11-13 NOTE — TELEPHONE ENCOUNTER
Reason for Call:  Other     Detailed comments: pt had a virtual yesterday and checking on his order for the COVID Test, looks like its pended?      Phone Number Patient can be reached at: Home number on file [phone] (home)    Best Time: any    Can we leave a detailed message on this number? YES    Call taken on 11/13/2020 at 11:37 AM by Briana Mendez

## 2020-11-20 ENCOUNTER — OFFICE VISIT (OUTPATIENT)
Dept: FAMILY MEDICINE | Facility: CLINIC | Age: 50
End: 2020-11-20
Attending: PHYSICIAN ASSISTANT
Payer: COMMERCIAL

## 2020-11-20 DIAGNOSIS — Z20.822 ENCOUNTER FOR LABORATORY TESTING FOR COVID-19 VIRUS: ICD-10-CM

## 2020-11-20 DIAGNOSIS — Z53.9 ERRONEOUS ENCOUNTER--DISREGARD: Primary | ICD-10-CM

## 2020-11-20 DIAGNOSIS — Z78.9 PATIENT TRAVELS: ICD-10-CM

## 2020-11-20 PROCEDURE — U0003 INFECTIOUS AGENT DETECTION BY NUCLEIC ACID (DNA OR RNA); SEVERE ACUTE RESPIRATORY SYNDROME CORONAVIRUS 2 (SARS-COV-2) (CORONAVIRUS DISEASE [COVID-19]), AMPLIFIED PROBE TECHNIQUE, MAKING USE OF HIGH THROUGHPUT TECHNOLOGIES AS DESCRIBED BY CMS-2020-01-R: HCPCS | Performed by: PHYSICIAN ASSISTANT

## 2020-11-21 LAB
SARS-COV-2 RNA SPEC QL NAA+PROBE: NOT DETECTED
SPECIMEN SOURCE: NORMAL

## 2020-11-22 NOTE — RESULT ENCOUNTER NOTE
Ed,    Your lab test to screen for covid-19 shows no-infection.  You can print a copy of these labs and letter to take with you as proof of covid testing for your travel.      Abigail Gregory PA-C

## 2021-01-15 ENCOUNTER — HEALTH MAINTENANCE LETTER (OUTPATIENT)
Age: 51
End: 2021-01-15

## 2021-02-08 ENCOUNTER — TELEPHONE (OUTPATIENT)
Dept: FAMILY MEDICINE | Facility: CLINIC | Age: 51
End: 2021-02-08

## 2021-02-08 NOTE — TELEPHONE ENCOUNTER
Reason for call:  Patient reporting a symptom    Symptom or request: Rectal Bleeding - Pt was Referred to Colorectal clinic. No appts until end of April. Could [facility] go [facility] [place].  Please Advise    Phone Number patient can be reached at:  Home number on file [phone] (home)    Best Time:  Any Time      Can we leave a detailed message on this number:  YES    Call taken on 2/8/2021 at 11:54 AM by Yady Schmitt

## 2021-02-10 ENCOUNTER — TRANSFERRED RECORDS (OUTPATIENT)
Dept: HEALTH INFORMATION MANAGEMENT | Facility: CLINIC | Age: 51
End: 2021-02-10

## 2021-02-13 ENCOUNTER — AMBULATORY - HEALTHEAST (OUTPATIENT)
Dept: SURGERY | Facility: AMBULATORY SURGERY CENTER | Age: 51
End: 2021-02-13

## 2021-02-13 DIAGNOSIS — Z11.59 ENCOUNTER FOR SCREENING FOR OTHER VIRAL DISEASES: ICD-10-CM

## 2021-02-26 ENCOUNTER — OFFICE VISIT (OUTPATIENT)
Dept: FAMILY MEDICINE | Facility: CLINIC | Age: 51
End: 2021-02-26
Payer: COMMERCIAL

## 2021-02-26 VITALS
RESPIRATION RATE: 14 BRPM | SYSTOLIC BLOOD PRESSURE: 112 MMHG | HEIGHT: 68 IN | HEART RATE: 89 BPM | BODY MASS INDEX: 26.37 KG/M2 | TEMPERATURE: 97.6 F | DIASTOLIC BLOOD PRESSURE: 78 MMHG | WEIGHT: 174 LBS | OXYGEN SATURATION: 98 %

## 2021-02-26 DIAGNOSIS — J30.2 SEASONAL ALLERGIC RHINITIS, UNSPECIFIED TRIGGER: ICD-10-CM

## 2021-02-26 DIAGNOSIS — R09.82 POST-NASAL DRIP: ICD-10-CM

## 2021-02-26 DIAGNOSIS — Z01.818 PREOP GENERAL PHYSICAL EXAM: Primary | ICD-10-CM

## 2021-02-26 DIAGNOSIS — Z13.220 LIPID SCREENING: ICD-10-CM

## 2021-02-26 DIAGNOSIS — R05.9 COUGH: ICD-10-CM

## 2021-02-26 DIAGNOSIS — Z87.09 HISTORY OF ASTHMA: ICD-10-CM

## 2021-02-26 DIAGNOSIS — Z13.1 SCREENING FOR DIABETES MELLITUS: ICD-10-CM

## 2021-02-26 DIAGNOSIS — K60.2 ANAL FISSURE: ICD-10-CM

## 2021-02-26 DIAGNOSIS — Z11.4 ENCOUNTER FOR SCREENING FOR HIV: ICD-10-CM

## 2021-02-26 DIAGNOSIS — Z11.59 NEED FOR HEPATITIS C SCREENING TEST: ICD-10-CM

## 2021-02-26 LAB
CHOLEST SERPL-MCNC: 198 MG/DL
ERYTHROCYTE [DISTWIDTH] IN BLOOD BY AUTOMATED COUNT: 12.4 % (ref 10–15)
GLUCOSE SERPL-MCNC: 87 MG/DL (ref 70–99)
HCT VFR BLD AUTO: 46.2 % (ref 40–53)
HCV AB SERPL QL IA: NONREACTIVE
HDLC SERPL-MCNC: 52 MG/DL
HGB BLD-MCNC: 16.1 G/DL (ref 13.3–17.7)
HIV 1+2 AB+HIV1 P24 AG SERPL QL IA: NONREACTIVE
LDLC SERPL CALC-MCNC: 119 MG/DL
MCH RBC QN AUTO: 29.9 PG (ref 26.5–33)
MCHC RBC AUTO-ENTMCNC: 34.8 G/DL (ref 31.5–36.5)
MCV RBC AUTO: 86 FL (ref 78–100)
NONHDLC SERPL-MCNC: 146 MG/DL
PLATELET # BLD AUTO: 203 10E9/L (ref 150–450)
RBC # BLD AUTO: 5.39 10E12/L (ref 4.4–5.9)
TRIGL SERPL-MCNC: 134 MG/DL
WBC # BLD AUTO: 9.4 10E9/L (ref 4–11)

## 2021-02-26 PROCEDURE — 85027 COMPLETE CBC AUTOMATED: CPT | Performed by: PHYSICIAN ASSISTANT

## 2021-02-26 PROCEDURE — 36415 COLL VENOUS BLD VENIPUNCTURE: CPT | Performed by: PHYSICIAN ASSISTANT

## 2021-02-26 PROCEDURE — 87389 HIV-1 AG W/HIV-1&-2 AB AG IA: CPT | Performed by: PHYSICIAN ASSISTANT

## 2021-02-26 PROCEDURE — 80061 LIPID PANEL: CPT | Performed by: PHYSICIAN ASSISTANT

## 2021-02-26 PROCEDURE — 86803 HEPATITIS C AB TEST: CPT | Performed by: PHYSICIAN ASSISTANT

## 2021-02-26 PROCEDURE — 99214 OFFICE O/P EST MOD 30 MIN: CPT | Performed by: PHYSICIAN ASSISTANT

## 2021-02-26 PROCEDURE — 82947 ASSAY GLUCOSE BLOOD QUANT: CPT | Performed by: PHYSICIAN ASSISTANT

## 2021-02-26 RX ORDER — LEVOCETIRIZINE DIHYDROCHLORIDE 5 MG/1
5 TABLET, FILM COATED ORAL EVERY EVENING
Qty: 30 TABLET | Refills: 11 | Status: SHIPPED | OUTPATIENT
Start: 2021-02-26

## 2021-02-26 RX ORDER — ALBUTEROL SULFATE 90 UG/1
2 AEROSOL, METERED RESPIRATORY (INHALATION) EVERY 4 HOURS PRN
Qty: 1 INHALER | Refills: 3 | Status: SHIPPED | OUTPATIENT
Start: 2021-02-26 | End: 2024-05-02

## 2021-02-26 RX ORDER — FLUTICASONE PROPIONATE 50 MCG
1-2 SPRAY, SUSPENSION (ML) NASAL DAILY
Qty: 16 ML | Refills: 11 | Status: SHIPPED | OUTPATIENT
Start: 2021-02-26

## 2021-02-26 ASSESSMENT — MIFFLIN-ST. JEOR: SCORE: 1629.79

## 2021-02-26 NOTE — NURSING NOTE
"Chief Complaint   Patient presents with     Pre-Op Exam       Initial /78 (BP Location: Right arm, Patient Position: Chair, Cuff Size: Adult Regular)   Pulse 89   Temp 97.6  F (36.4  C) (Tympanic)   Resp 14   Ht 1.721 m (5' 7.75\")   Wt 78.9 kg (174 lb)   SpO2 98%   BMI 26.65 kg/m   Estimated body mass index is 26.65 kg/m  as calculated from the following:    Height as of this encounter: 1.721 m (5' 7.75\").    Weight as of this encounter: 78.9 kg (174 lb).    Patient presents to the clinic using No DME    Health Maintenance that is potentially due pending provider review:  NONE        Is there anyone who you would like to be able to receive your results? No  If yes have patient fill out SILVIO      "

## 2021-02-26 NOTE — PATIENT INSTRUCTIONS
Labs today   Do your health directive     Try new meds for allergies    Stop naproxen 4 days prior to surgery, any ibuprofen 1 day prior to surgery.  Acetaminophen is ok.  Preparing for Your Surgery  Getting started  A nurse will call you to review your health history and instructions. They will give you an arrival time based on your scheduled surgery time.  Please be ready to share the following:    Your doctor's clinic name and phone number    Your medical, surgical and anesthesia history    A list of allergies and sensitivities    A list of medicines, including herbal treatments and over-the-counter drugs    Whether the patient has a legal guardian (ask how to send us the papers in advance)  If you have a child who's having surgery, please ask for a copy of Preparing for Your Child's Surgery.    Preparing for surgery    Within 30 days of surgery: Have a pre-op exam (sometimes called an H&P, or History and Physical). This can be done at a clinic or pre-operative center.  ? If you're having a , you may not need this exam. Talk to your care team    At your pre-op exam, talk to your care team about all medicines you take. If you need to stop any medicines before surgery, ask when to start taking them again.  ? We do this for your safety. Many medicines can make you bleed too much during surgery. Some change how well surgery (anesthesia) drugs work.    Call your insurance company to let them know you're having surgery. (If you don't have insurance, call [phone].)    Call your clinic if there's any change in your health. This includes signs of a cold or flu (sore throat, runny nose, cough, rash, fever). It also includes a scrape or scratch near the surgery site.    If you have questions on the day of surgery, call your hospital or surgery center.  Eating and drinking guidelines  For your safety: Unless your surgeon tells you otherwise, follow the guidelines below.    Eat and drink as usual until 8 hours  before surgery. After that, no food or milk.    Drink clear liquids until 2 hours before surgery. These are liquids you can see through, like water, Gatorade and Propel Water. You may also have black coffee and tea (no cream or milk).    Nothing by mouth within 2 hours of surgery. This includes gum, candy and breath mints.    If you drink, stop drinking alcohol the night before surgery.    If your care team tells you to take medicine on the morning of surgery, it's okay to take it with a sip of water.  Preventing infection    Shower or bathe the night before and morning of your surgery. Follow the instructions your clinic gave you. (If no instructions, use regular soap.)    Don't shave or clip hair near your surgery site. We'll remove the hair if needed.    Don't smoke or vape the morning of surgery. You may chew nicotine gum up to 2 hours before surgery. A nicotine patch is okay.  ? Note: Some surgeries require you to completely quit smoking and nicotine. Check with your surgeon.    Your care team will make every effort to keep you safe from infection. We will:  ? Clean our hands often with soap and water (or an alcohol-based hand rub).  ? Clean the skin at your surgery site with a special soap that kills germs.  ? Give you a special gown to keep you warm. (Cold raises the risk of infection.)  ? Wear special hair covers, masks, gowns and gloves during surgery.  ? Give antibiotic medicine, if prescribed. Not all surgeries need antibiotics.  What to bring on the day of surgery    Photo ID and insurance card    Copy of your health care directive, if you have one    Glasses and hearing aides (bring cases)  ? You can't wear contacts during surgery    Inhaler and eye drops, if you use them (tell us about these when you arrive)    CPAP machine or breathing device, if you use them    A few personal items, if spending the night    If you have . . .  ? A pacemaker or ICD (cardiac defibrillator): Bring the ID card.  ? An  implanted stimulator: Bring the remote control.  ? A legal guardian: Bring a copy of the certified (court-stamped) guardianship papers.  Please remove any jewelry, including body piercings. Leave jewelry and other valuables at home.  If you're going home the day of surgery  Important: If you don't follow the rules below, we must cancel your surgery.     Arrange for someone to drive you home after surgery. You may not drive, take a taxi or take public transportation by yourself (unless you'll have local anesthesia only).    Arrange for a responsible adult to stay with you overnight. If you don't, we may keep you in the hospital overnight, and you may need to pay the costs yourself.  Questions?   If you have any questions for your care team, list them here: _________________________________________________________________________________________________________________________________________________________________________________________________________________________________________________________________________________________________________________________  For informational purposes only. Not to replace the advice of your health care provider. Copyright   2003, 2019 [facility]. All rights reserved. Clinically reviewed by Abigail Joseph MD. [employer] [id number] - REV 4/20.

## 2021-02-26 NOTE — PROGRESS NOTES
[facility]  [address] [place] [address]  Phone: [phone]  Fax: [phone]  Primary Provider: Abigail Gregory    PREOPERATIVE EVALUATION:  Today's date: 2/26/2021    Ed Jonathan is a 50 year old male who presents for a preoperative evaluation.    Surgical Information:  Surgery/Procedure: EXAM UNDER ANESTHESIA LATERAL INTERNAL SPHINCTEROTOMY   Surgery Location: [facility]  Surgeon: Gwen Vargas MD  Surgery Date: 3/9/2021  Time of Surgery: 1:00 PM  Where patient plans to recover: At home with family  Fax number for surgical facility: [phone]    Type of Anesthesia Anticipated: General    Assessment & Plan     The proposed surgical procedure is considered LOW risk.    Preop general physical exam  Anal fissure  Lab given Jehovah witness status  - CBC with platelets    Post-nasal drip  - fluticasone (FLONASE) 50 MCG/ACT nasal spray; Spray 1-2 sprays into both nostrils daily  - levocetirizine (XYZAL) 5 MG tablet; Take 1 tablet (5 mg) by mouth every evening    Cough  - fluticasone (FLONASE) 50 MCG/ACT nasal spray; Spray 1-2 sprays into both nostrils daily  - levocetirizine (XYZAL) 5 MG tablet; Take 1 tablet (5 mg) by mouth every evening    Seasonal allergic rhinitis, unspecified trigger  - albuterol (PROAIR HFA/PROVENTIL HFA/VENTOLIN HFA) 108 (90 Base) MCG/ACT inhaler; Inhale 2 puffs into the lungs every 4 hours as needed for shortness of breath / dyspnea or wheezing    History of asthma  - albuterol (PROAIR HFA/PROVENTIL HFA/VENTOLIN HFA) 108 (90 Base) MCG/ACT inhaler; Inhale 2 puffs into the lungs every 4 hours as needed for shortness of breath / dyspnea or wheezing    Refusal of blood transfusions as patient is Muslim  - CBC with platelets    Lipid screening  - Lipid panel reflex to direct LDL Fasting    Encounter for screening for HIV  - HIV Antigen Antibody Combo    Screening for diabetes mellitus  - Glucose    Need for hepatitis C  screening test  - Hepatitis C Screen Reflex to HCV RNA Quant and Genotype    Risks and Recommendations:  The patient has the following additional risks and recommendations for perioperative complications:   - No identified additional risk factors other than previously addressed    Medication Instructions:   - naproxen (Aleve, Naprosyn): HOLD 4 days before surgery.     RECOMMENDATION:  APPROVAL GIVEN to proceed with proposed procedure, without further diagnostic evaluation.    Review of external notes as documented above  - surgeon note    Patient Instructions     Labs today   Do your health directive     Try new meds for allergies    Stop naproxen 4 days prior to surgery, any ibuprofen 1 day prior to surgery.  Acetaminophen is ok.  Preparing for Your Surgery  Getting started  A nurse will call you to review your health history and instructions. They will give you an arrival time based on your scheduled surgery time.  Please be ready to share the following:    Your doctor's clinic name and phone number    Your medical, surgical and anesthesia history    A list of allergies and sensitivities    A list of medicines, including herbal treatments and over-the-counter drugs    Whether the patient has a legal guardian (ask how to send us the papers in advance)  If you have a child who's having surgery, please ask for a copy of Preparing for Your Child's Surgery.    Preparing for surgery    Within 30 days of surgery: Have a pre-op exam (sometimes called an H&P, or History and Physical). This can be done at a clinic or pre-operative center.  ? If you're having a , you may not need this exam. Talk to your care team    At your pre-op exam, talk to your care team about all medicines you take. If you need to stop any medicines before surgery, ask when to start taking them again.  ? We do this for your safety. Many medicines can make you bleed too much during surgery. Some change how well surgery (anesthesia) drugs  work.    Call your insurance company to let them know you're having surgery. (If you don't have insurance, call [phone].)    Call your clinic if there's any change in your health. This includes signs of a cold or flu (sore throat, runny nose, cough, rash, fever). It also includes a scrape or scratch near the surgery site.    If you have questions on the day of surgery, call your hospital or surgery center.  Eating and drinking guidelines  For your safety: Unless your surgeon tells you otherwise, follow the guidelines below.    Eat and drink as usual until 8 hours before surgery. After that, no food or milk.    Drink clear liquids until 2 hours before surgery. These are liquids you can see through, like water, Gatorade and Propel Water. You may also have black coffee and tea (no cream or milk).    Nothing by mouth within 2 hours of surgery. This includes gum, candy and breath mints.    If you drink, stop drinking alcohol the night before surgery.    If your care team tells you to take medicine on the morning of surgery, it's okay to take it with a sip of water.  Preventing infection    Shower or bathe the night before and morning of your surgery. Follow the instructions your clinic gave you. (If no instructions, use regular soap.)    Don't shave or clip hair near your surgery site. We'll remove the hair if needed.    Don't smoke or vape the morning of surgery. You may chew nicotine gum up to 2 hours before surgery. A nicotine patch is okay.  ? Note: Some surgeries require you to completely quit smoking and nicotine. Check with your surgeon.    Your care team will make every effort to keep you safe from infection. We will:  ? Clean our hands often with soap and water (or an alcohol-based hand rub).  ? Clean the skin at your surgery site with a special soap that kills germs.  ? Give you a special gown to keep you warm. (Cold raises the risk of infection.)  ? Wear special hair covers, masks, gowns and gloves during  surgery.  ? Give antibiotic medicine, if prescribed. Not all surgeries need antibiotics.  What to bring on the day of surgery    Photo ID and insurance card    Copy of your health care directive, if you have one    Glasses and hearing aides (bring cases)  ? You can't wear contacts during surgery    Inhaler and eye drops, if you use them (tell us about these when you arrive)    CPAP machine or breathing device, if you use them    A few personal items, if spending the night    If you have . . .  ? A pacemaker or ICD (cardiac defibrillator): Bring the ID card.  ? An implanted stimulator: Bring the remote control.  ? A legal guardian: Bring a copy of the certified (court-stamped) guardianship papers.  Please remove any jewelry, including body piercings. Leave jewelry and other valuables at home.  If you're going home the day of surgery  Important: If you don't follow the rules below, we must cancel your surgery.     Arrange for someone to drive you home after surgery. You may not drive, take a taxi or take public transportation by yourself (unless you'll have local anesthesia only).    Arrange for a responsible adult to stay with you overnight. If you don't, we may keep you in the hospital overnight, and you may need to pay the costs yourself.  Questions?   If you have any questions for your care team, list them here: _________________________________________________________________________________________________________________________________________________________________________________________________________________________________________________________________________________________________________________________  For informational purposes only. Not to replace the advice of your health care provider. Copyright   2003, 2019 [facility]. All rights reserved. Clinically reviewed by Abigail Joseph MD. [employer] 931161 - REV 4/20.        Subjective     HPI related to upcoming procedure: anal fissure  refractory to other treatments     Preop Questions 2/24/2021   1. Have you ever had a heart attack or stroke? No   2. Have you ever had surgery on your heart or blood vessels, such as a stent placement, a coronary artery bypass, or surgery on an artery in your head, neck, heart, or legs? No   3. Do you have chest pain with activity? No   4. Do you have a history of  heart failure? No   5. Do you currently have a cold, bronchitis or symptoms of other infection? No   6. Do you have a cough, shortness of breath, or wheezing? No   7. Do you or anyone in your family have previous history of blood clots? No   8. Do you or does anyone in your family have a serious bleeding problem such as prolonged bleeding following surgeries or cuts? No   9. Have you ever had problems with anemia or been told to take iron pills? No   10. Have you had any abnormal blood loss such as black, tarry or bloody stools? No   11. Have you ever had a blood transfusion? No   12. Are you willing to have a blood transfusion if it is medically needed before, during, or after your surgery? NO - Jehovah Witness   13. Have you or any of your relatives ever had problems with anesthesia? YES - father had stroke during prostate surgery   14. Do you have sleep apnea, excessive snoring or daytime drowsiness? No   15. Do you have any artifical heart valves or other implanted medical devices like a pacemaker, defibrillator, or continuous glucose monitor? No   16. Do you have artificial joints? No   17. Are you allergic to latex? No     Health Care Directive:  Patient does not have a Health Care Directive or Living Will: Discussed advance care planning with patient; information given to patient to review.    Preoperative Review of :   reviewed - no record of controlled substances prescribed.  {  Status of Chronic Conditions:  See problem list for active medical problems.  Problems all longstanding and stable, except as noted/documented.  See ROS for  pertinent symptoms related to these conditions.     He has history of asthma.  He notes currently has cough when at home, when he feels the mucus in his throat.  Notes he heats with wood burning stove and has dog despite his allergy.  He notes that by the time he drives to work, cough is gone all day.  He feels symptoms return immediately upon re-entering the house.  Symptoms also resolve with allergy pills, but they make him groggy.  Asthma typically does pretty well except with illness.      Review of Systems  Constitutional, neuro, ENT, endocrine, pulmonary, cardiac, gastrointestinal, genitourinary, musculoskeletal, integument and psychiatric systems are negative, except as otherwise noted.    Patient Active Problem List    Diagnosis Date Noted     Refusal of blood transfusions as patient is Mormon 02/26/2021     Priority: Medium     Postconcussion syndrome 03/25/2016     Priority: Medium     Saw Dr Morillo at [facility] on 3/22/16 - MRI brain and neck, starting elavil 25, vestibular therapy, ongoing Physical Therapy, recheck 4-6 wks.  Note sent to scanning.       Shoulder joint pain, unspecified laterality 02/16/2016     Priority: Medium     Saw [facility] 1/21/16 - impingement syndrome and adhesive capsulitis both shoulders, recommended re-trying cortisone shots.         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 No pertinent past medical history.  Past Surgical History:   Procedure Laterality Date     AMPUTATE FINGER(S) Left 2/2/2018    Procedure: AMPUTATE FINGER(S);  Left long finger revision amputation.;  Surgeon: Denise Lyle MD;  Location: [facility] OR     ARTHROTOMY SHOULDER, ROTATOR CUFF REPAIR, COMBINED Right 5/2/2016    Procedure: COMBINED ARTHROTOMY SHOULDER, ROTATOR CUFF REPAIR;  Surgeon: Rafael Pacheco MD;  Location: [facility] OR     ARTHROTOMY SHOULDER, SUBACROMIAL DECOMPRESSION, COMBINED Left 6/13/2016    Procedure: COMBINED ARTHROTOMY  SHOULDER, SUBACROMIAL DECOMPRESSION;  Surgeon: Rafael Pacheco MD;  Location: [facility] OR     DESTRUCTION OF PARAVERTEBRAL FACETCERVICAL / THORACIC SINGLE Right 8/25/2017    Procedure: DESTRUCTION OF PARAVERTEBRAL FACET CERVICAL / THORACIC SINGLE;  Right Radiofrequency Ablation of the Cervical 3rd occipital nerve, C3. 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      HC FULL THICK GRAFT HEAD/AXIL/GEN/HND/FT <=20 CM  8/31/2003    Full thickness skin graft to the donor site of the cross finger flap      SURGICAL HISTORY OF -   9/22/2003    Division of left cross finger flap     SURGICAL HISTORY OF -   2003    left ACL repair and partial menisectomy     Current Outpatient Medications   Medication Sig Dispense Refill     albuterol (PROAIR HFA/PROVENTIL HFA/VENTOLIN HFA) 108 (90 Base) MCG/ACT inhaler Inhale 2 puffs into the lungs every 4 hours as needed for shortness of breath / dyspnea or wheezing 1 Inhaler 3     fluticasone (FLONASE) 50 MCG/ACT nasal spray Spray 1-2 sprays into both nostrils daily 16 mL 11     levocetirizine (XYZAL) 5 MG tablet Take 1 tablet (5 mg) by mouth every evening 30 tablet 11     EPINEPHrine (ANY BX GENERIC EQUIV) 0.3 MG/0.3ML injection 2-pack Inject 0.3 mLs (0.3 mg) into the muscle once as needed for anaphylaxis 0.6 mL 0     naproxen sodium (ANAPROX) 220 MG tablet Take 1-2 tablets (220-440 mg) by mouth 2 times daily (with meals) (Patient not taking: Reported on 11/12/2020) 56 tablet 0       Allergies   Allergen Reactions     Peanuts [Nuts] Hives     And the oil        Social History     Tobacco Use     Smoking status: Former Smoker     Packs/day: 1.50     Years: 17.00     Pack years: 25.50     Quit date: 6/20/2005     Years since quitting: 15.6     Smokeless tobacco: Never Used     Tobacco comment: around second hand smoke daily  "  Substance Use Topics     Alcohol use: Yes     Comment: occ     Family History   Problem Relation Age of Onset     Heart Disease Mother      Diabetes Father      Cerebrovascular Disease Father         during surgery     Colon Cancer Father      Depression Sister      Obesity Sister      Hypertension Sister      Obesity Sister      Diabetes Sister      Hypertension Sister      Depression Sister      Anxiety Disorder Sister      History   Drug Use No         Objective     /78 (BP Location: Right arm, Patient Position: Chair, Cuff Size: Adult Regular)   Pulse 89   Temp 97.6  F (36.4  C) (Tympanic)   Resp 14   Ht 1.721 m (5' 7.75\")   Wt 78.9 kg (174 lb)   SpO2 98%   BMI 26.65 kg/m      Physical Exam    GENERAL APPEARANCE: healthy, alert and no distress     EYES: EOMI,  PERRL     HENT: ear canals and TM's normal and nose and mouth without ulcers or lesions     NECK: no adenopathy, no asymmetry, masses, or scars and thyroid normal to palpation     RESP: lungs clear to auscultation - no rales, rhonchi or wheezes     CV: regular rates and rhythm, normal S1 S2, no S3 or S4 and no murmur, click or rub     ABDOMEN:  soft, nontender, no HSM or masses and bowel sounds normal     MS: extremities normal- no gross deformities noted, no evidence of inflammation in joints, FROM in all extremities.     SKIN: no suspicious lesions or rashes     NEURO: Normal strength and tone, sensory exam grossly normal, mentation intact and speech normal     PSYCH: mentation appears normal. and affect normal/bright    Recent Labs   Lab Test 02/04/20  1049 04/13/19  2044   HGB 16.2 15.2    178   INR 1.02  --     142   POTASSIUM 3.5 3.8   CR 0.78 0.93        Diagnostics:  Labs pending at this time.  Results will be reviewed when available.   No EKG required, no history of coronary heart disease, significant arrhythmia, peripheral arterial disease or other structural heart disease.    Revised Cardiac Risk Index (RCRI):  The " patient has the following serious cardiovascular risks for perioperative complications:   - No serious cardiac risks = 0 points     RCRI Interpretation: 0 points: Class I (very low risk - 0.4% complication rate)         Signed Electronically by: Abigail Gregory PA-C  Copy of this evaluation report is provided to requesting physician.    Preop [facility] Preop Guidelines    Revised Cardiac Risk Index

## 2021-03-01 NOTE — RESULT ENCOUNTER NOTE
Ed,    Cholesterol is up just a bit but its mild.  Keep always focusing on heart-healthy lifestyle.  Let me know if you have questions on this.  Otherwise labs look fine.    Good luck with your surgery.      Abigail

## 2021-03-02 DIAGNOSIS — Z11.59 ENCOUNTER FOR SCREENING FOR OTHER VIRAL DISEASES: Primary | ICD-10-CM

## 2021-03-05 DIAGNOSIS — Z11.59 ENCOUNTER FOR SCREENING FOR OTHER VIRAL DISEASES: ICD-10-CM

## 2021-03-05 LAB
LABORATORY COMMENT REPORT: NORMAL
SARS-COV-2 RNA RESP QL NAA+PROBE: NEGATIVE
SARS-COV-2 RNA RESP QL NAA+PROBE: NORMAL
SPECIMEN SOURCE: NORMAL
SPECIMEN SOURCE: NORMAL

## 2021-03-05 PROCEDURE — U0003 INFECTIOUS AGENT DETECTION BY NUCLEIC ACID (DNA OR RNA); SEVERE ACUTE RESPIRATORY SYNDROME CORONAVIRUS 2 (SARS-COV-2) (CORONAVIRUS DISEASE [COVID-19]), AMPLIFIED PROBE TECHNIQUE, MAKING USE OF HIGH THROUGHPUT TECHNOLOGIES AS DESCRIBED BY CMS-2020-01-R: HCPCS | Performed by: COLON & RECTAL SURGERY

## 2021-03-05 PROCEDURE — U0005 INFEC AGEN DETEC AMPLI PROBE: HCPCS | Performed by: COLON & RECTAL SURGERY

## 2021-03-05 ASSESSMENT — MIFFLIN-ST. JEOR
SCORE: 1612.89
SCORE: 1612.89

## 2021-03-08 ENCOUNTER — ANESTHESIA - HEALTHEAST (OUTPATIENT)
Dept: SURGERY | Facility: AMBULATORY SURGERY CENTER | Age: 51
End: 2021-03-08

## 2021-03-09 ENCOUNTER — SURGERY - HEALTHEAST (OUTPATIENT)
Dept: SURGERY | Facility: AMBULATORY SURGERY CENTER | Age: 51
End: 2021-03-09

## 2021-03-09 ENCOUNTER — HOSPITAL ENCOUNTER (OUTPATIENT)
Dept: SURGERY | Facility: AMBULATORY SURGERY CENTER | Age: 51
Discharge: HOME | End: 2021-03-09
Attending: COLON & RECTAL SURGERY | Admitting: COLON & RECTAL SURGERY
Payer: COMMERCIAL

## 2021-03-09 ENCOUNTER — COMMUNICATION - HEALTHEAST (OUTPATIENT)
Dept: SCHEDULING | Facility: CLINIC | Age: 51
End: 2021-03-09

## 2021-03-09 ASSESSMENT — MIFFLIN-ST. JEOR
SCORE: 1612.89
SCORE: 1612.89

## 2021-04-07 ENCOUNTER — TRANSFERRED RECORDS (OUTPATIENT)
Dept: HEALTH INFORMATION MANAGEMENT | Facility: CLINIC | Age: 51
End: 2021-04-07

## 2021-04-07 ENCOUNTER — AMBULATORY - HEALTHEAST (OUTPATIENT)
Dept: SURGERY | Facility: AMBULATORY SURGERY CENTER | Age: 51
End: 2021-04-07

## 2021-04-07 DIAGNOSIS — Z11.59 ENCOUNTER FOR SCREENING FOR OTHER VIRAL DISEASES: Primary | ICD-10-CM

## 2021-04-07 DIAGNOSIS — Z11.59 ENCOUNTER FOR SCREENING FOR OTHER VIRAL DISEASES: ICD-10-CM

## 2021-04-09 ASSESSMENT — MIFFLIN-ST. JEOR: SCORE: 1612.89

## 2021-04-10 DIAGNOSIS — Z11.59 ENCOUNTER FOR SCREENING FOR OTHER VIRAL DISEASES: ICD-10-CM

## 2021-04-10 LAB
SARS-COV-2 RNA RESP QL NAA+PROBE: NORMAL
SPECIMEN SOURCE: NORMAL

## 2021-04-10 PROCEDURE — U0003 INFECTIOUS AGENT DETECTION BY NUCLEIC ACID (DNA OR RNA); SEVERE ACUTE RESPIRATORY SYNDROME CORONAVIRUS 2 (SARS-COV-2) (CORONAVIRUS DISEASE [COVID-19]), AMPLIFIED PROBE TECHNIQUE, MAKING USE OF HIGH THROUGHPUT TECHNOLOGIES AS DESCRIBED BY CMS-2020-01-R: HCPCS | Performed by: COLON & RECTAL SURGERY

## 2021-04-10 PROCEDURE — U0005 INFEC AGEN DETEC AMPLI PROBE: HCPCS | Performed by: COLON & RECTAL SURGERY

## 2021-04-11 LAB
LABORATORY COMMENT REPORT: NORMAL
SARS-COV-2 RNA RESP QL NAA+PROBE: NEGATIVE
SPECIMEN SOURCE: NORMAL

## 2021-04-12 ENCOUNTER — ANESTHESIA - HEALTHEAST (OUTPATIENT)
Dept: SURGERY | Facility: AMBULATORY SURGERY CENTER | Age: 51
End: 2021-04-12

## 2021-04-13 ENCOUNTER — SURGERY - HEALTHEAST (OUTPATIENT)
Dept: SURGERY | Facility: AMBULATORY SURGERY CENTER | Age: 51
End: 2021-04-13

## 2021-04-13 ENCOUNTER — COMMUNICATION - HEALTHEAST (OUTPATIENT)
Dept: SCHEDULING | Facility: CLINIC | Age: 51
End: 2021-04-13

## 2021-04-13 ASSESSMENT — MIFFLIN-ST. JEOR: SCORE: 1612.89

## 2021-05-12 ENCOUNTER — TRANSFERRED RECORDS (OUTPATIENT)
Dept: HEALTH INFORMATION MANAGEMENT | Facility: CLINIC | Age: 51
End: 2021-05-12

## 2021-06-05 VITALS — WEIGHT: 174 LBS | BODY MASS INDEX: 27.31 KG/M2 | HEIGHT: 67 IN

## 2021-06-05 VITALS
HEIGHT: 67 IN | WEIGHT: 174 LBS | BODY MASS INDEX: 27.31 KG/M2 | HEIGHT: 67 IN | BODY MASS INDEX: 27.31 KG/M2 | WEIGHT: 174 LBS

## 2021-06-15 NOTE — ANESTHESIA CARE TRANSFER NOTE
Last vitals:   Vitals:    03/09/21 1500   BP: 123/82   Pulse: 90   Resp: 16   Temp: 36.6  C (97.8  F)   SpO2: 99%     Patient's level of consciousness is drowsy  Spontaneous respirations: yes  Maintains airway independently: yes  Dentition unchanged: yes  Oropharynx: oropharynx clear of all foreign objects    QCDR Measures:  ASA# 20 - Surgical Safety Checklist: WHO surgical safety checklist completed prior to induction    PQRS# 430 - Adult PONV Prevention: 4558F - Pt received => 2 anti-emetic agents (different classes) preop & intraop  ASA# 8 - Peds PONV Prevention: NA - Not pediatric patient, not GA or 2 or more risk factors NOT present  PQRS# 424 - Shani-op Temp Management: 4559F - At least one body temp DOCUMENTED => 35.5C or 95.9F within required timeframe  PQRS# 426 - PACU Transfer Protocol: - Transfer of care checklist used  ASA# 14 - Acute Post-op Pain: ASA14B - Patient did NOT experience pain >= 7 out of 10

## 2021-06-15 NOTE — ANESTHESIA PREPROCEDURE EVALUATION
Anesthesia Evaluation      Patient summary reviewed   No history of anesthetic complications     Airway   Mallampati: II  Neck ROM: full   Pulmonary     breath sounds clear to auscultation  (+) a smoker  (-) pneumonia, asthma, shortness of breath, recent URI, sleep apnea                         Cardiovascular   (-) angina  Rhythm: regular  Rate: normal,         Neuro/Psych    (-) no neuromuscular disease, no CVA    Endo/Other    (+) obesity,   (-) no diabetes     GI/Hepatic/Renal            Dental - normal exam                        Anesthesia Plan  Planned anesthetic: MAC  GA as needed  ASA 2     Anesthetic plan and risks discussed with: patient    Post-op plan: routine recovery

## 2021-06-15 NOTE — ANESTHESIA POSTPROCEDURE EVALUATION
Patient: Ed Jonathan  Procedure(s):  EXAM UNDER ANESTHESIA LATERAL INTERNAL SPHINCTEROTOMY  Anesthesia type: general    Patient location: PACU  Last vitals:   Vitals Value Taken Time   /88 03/09/21 1600   Temp 36.6  C (97.8  F) 03/09/21 1500   Pulse 73 03/09/21 1600   Resp 16 03/09/21 1530   SpO2 95 % 03/09/21 1600   Vitals shown include unvalidated device data.  Post vital signs: stable  Level of consciousness: awake and responds to simple questions  Post-anesthesia pain: pain controlled  Post-anesthesia nausea and vomiting: no  Pulmonary: unassisted, return to baseline  Cardiovascular: stable and blood pressure at baseline  Hydration: adequate  Anesthetic events: no    QCDR Measures:  ASA# 11 - Shani-op Cardiac Arrest: ASA11B - Patient did NOT experience unanticipated cardiac arrest  ASA# 12 - Shani-op Mortality Rate: ASA12B - Patient did NOT die  ASA# 13 - PACU Re-Intubation Rate: ASA13B - Patient did NOT require a new airway mgmt  ASA# 10 - Composite Anes Safety: ASA10A - No serious adverse event    Additional Notes:

## 2021-06-16 NOTE — ANESTHESIA PREPROCEDURE EVALUATION
Anesthesia Evaluation      Patient summary reviewed   No history of anesthetic complications     Airway   Mallampati: II  Neck ROM: full   Pulmonary     breath sounds clear to auscultation  (+) a smoker  (-) pneumonia, asthma, shortness of breath, recent URI, sleep apnea                         Cardiovascular   (-) angina  Rhythm: regular  Rate: normal,         Neuro/Psych    (-) no neuromuscular disease, no CVA    Endo/Other    (+) obesity,   (-) no diabetes     GI/Hepatic/Renal       Other findings:   COVID negative 4/10/21      Dental - normal exam                          Anesthesia Plan  Planned anesthetic: general endotracheal    ASA 2   Induction: intravenous   Anesthetic plan and risks discussed with: patient  Anesthesia plan special considerations: antiemetics,   Post-op plan: routine recovery

## 2021-06-16 NOTE — ANESTHESIA CARE TRANSFER NOTE
Last vitals:   Vitals:    04/13/21 1544   BP: 128/81   Pulse: 96   Resp: 18   Temp: 36.7  C (98.1  F)   SpO2: 100%     Patient spontaneous RR, -500s, suctioned, following commands, extubated to facemask 10LPM, O2 sats 100%. VSS. Report to RN.    Patient's level of consciousness is drowsy  Spontaneous respirations: yes  Maintains airway independently: yes  Dentition unchanged: yes  Oropharynx: oropharynx clear of all foreign objects    QCDR Measures:  ASA# 20 - Surgical Safety Checklist: WHO surgical safety checklist completed prior to induction    PQRS# 430 - Adult PONV Prevention: 4558F - Pt received => 2 anti-emetic agents (different classes) preop & intraop  ASA# 8 - Peds PONV Prevention: NA - Not pediatric patient, not GA or 2 or more risk factors NOT present  PQRS# 424 - Shani-op Temp Management: 4559F - At least one body temp DOCUMENTED => 35.5C or 95.9F within required timeframe  PQRS# 426 - PACU Transfer Protocol: - Transfer of care checklist used  ASA# 14 - Acute Post-op Pain: ASA14B - Patient did NOT experience pain >= 7 out of 10

## 2021-06-16 NOTE — ANESTHESIA POSTPROCEDURE EVALUATION
Patient: Ed Jonathan  Procedure(s):  EXAM UNDER ANESTHESIA WITH HEMORRHOIDECTOMY X2  Anesthesia type: MAC    Patient location: PACU  Last vitals:   Vitals Value Taken Time   /81 04/13/21 1631   Temp 36.7  C (98.1  F) 04/13/21 1544   Pulse 85 04/13/21 1644   Resp 20 04/13/21 1630   SpO2 97 % 04/13/21 1644   Vitals shown include unvalidated device data.  Post vital signs: stable  Level of consciousness: awake and responds to simple questions  Post-anesthesia pain: pain controlled  Post-anesthesia nausea and vomiting: no  Pulmonary: unassisted, return to baseline  Cardiovascular: stable and blood pressure at baseline  Hydration: adequate  Anesthetic events: no    QCDR Measures:  ASA# 11 - Shani-op Cardiac Arrest: ASA11B - Patient did NOT experience unanticipated cardiac arrest  ASA# 12 - Shani-op Mortality Rate: ASA12B - Patient did NOT die  ASA# 13 - PACU Re-Intubation Rate: ASA13B - Patient did NOT require a new airway mgmt  ASA# 10 - Composite Anes Safety: ASA10A - No serious adverse event    Additional Notes:

## 2021-07-02 NOTE — H&P
H&P by Gwen Vargas MD at 3/9/2021  1:25 PM     Author: Gwen Vargas MD Service: Surgery Author Type: Physician    Filed: 3/9/2021  2:04 PM Date of Service: 3/9/2021  1:25 PM Status: Signed    : Gwen Vargas MD (Physician)       Colorectal Surgery Staff:    I have reviewed he history and physical dated 2/26/21. There are no interval changes. Plan for rectal exam under anesthesia with lateral internal sphincterotomy.    Gwen Vargas MD, [place]  [facility]  Pager: [phone]  Office: [phone]  3/9/2021 2:04 PM

## 2021-07-04 NOTE — OP NOTE
Op Note by Gwen Vargas MD at 3/9/2021  1:25 PM     Author: Gwen Vargas MD Service: Surgery Author Type: Physician    Filed: 3/9/2021  2:55 PM Date of Service: 3/9/2021  1:25 PM Status: Addendum    : Gwen Vargas MD (Physician)    Related Notes: Original Note by Gwen Vargas MD (Physician) filed at 3/9/2021  2:55 PM       COLON AND RECTAL SURGERY OPERATIVE NOTE    DATE OF SERVICE: 3/9/2021     PROCEDURE NAME: Rectal exam under anesthesia     PRE-PROCEDURE DIAGNOSIS: Chronic anal fissure    POST-PROCEDURE DIAGNOSIS: Internal hemorrhoids, no fissure present     SURGEON: Gwen Vargas MD     ASSISTANT: None     FINDINGS: No evidence of anal fissure. Mild-moderate non-bleeding internal hemorrhoids. He will be scheduled with me for a complete colonoscopy.      ESTIMATED BLOOD LOSS: 0 mL     ANESTHESIA: MAC converted to GETA     SPECIMEN: None.     INDICATIONS FOR PROCEDURE:  Dann Castillo is a 50 y.o. who initially presented with typical signs and symptoms of a chronic anal fissure.  These symptoms were 10 out of 10 pain while he had a bowel movement and bright red blood with bowel movements.  He had so much pain on examination that I was unable to get a detailed rectal examination done in the office.  I initially started him on medical therapy however his symptoms remain.  We discussed the neck step in treatment to be a rectal examination under anesthesia with a possible lateral internal sphincterotomy if I saw an anal fissure. The risks and benefits of surgery were discussed with the patient including infection, abscess recurrence, need for further operative interventions, possible damage to the sphincter and incontinence (or changes in continence) and increased pain and bleeding were discussed with the patient. Alternatives were also discussed. The patient agreed to proceed with surgery and informed consent was obtained.     DESCRIPTION OF PROCEDURE:  The patient was taken to the operating room and placed under  MAC anesthesia. He was then placed in the prone dillon knife position on the operating room table. The buttocks were taped apart. The surgical area was then prepped and draped in the usual sterile fashion. A timeout was performed per protocol.  Examination of the perianal skin did not show any abnormalities. Next, a terminal pudendal nerve block was performed with 30mL of a 50/50 mix of 1% lidocaine with epinephrine and 0.25%.  During this point of the procedure the patient began to have a cough and there was concern for him being able to protect his airway.  The patient was immediately flipped over onto the stretcher and intubated by anesthesia.  There was no evidence of a large aspiration event.  As the patient was now already intubated and the airway was protected we concurrently decided to proceed with the procedure as it would be very short in duration.  The patient was again moved into prone jackknife position with the buttocks effaced and he was prepped and draped as above.  I then proceeded to the rectal examination under anesthesia.    Digital rectal examination revealed normal anal canal and distal mucosa with the exception of some mild to moderate internal hemorrhoids.  I evaluated the anal canal next with a Hill-Vazquez anoscope.  There was no evidence of anal fissure anywhere circumferentially.  There were mild to moderate internal hemorrhoids and a small hypertrophic anal papilla.  There was no evidence of any other abnormality.  Because the patient's symptoms were so severe I looked very closely several times to find any abnormality or evidence of any abscess, fissure, or fistula but I could not find any abnormality.  Based on this I now terminated the procedure.    COMPLICATIONS: None apparent    DISPOSITION: Stable, extubated, to PACU    Gwen Vargas MD, [place]  [facility]  Pager: [phone]  Office: [phone]  3/9/2021 2:46 PM

## 2021-07-04 NOTE — ADDENDUM NOTE
Addendum Note by Macarena Trevizo CRNA at 3/10/2021  7:12 AM     Author: Macarena Trevizo CRNA Service: -- Author Type: Nurse Anesthetist    Filed: 3/10/2021  7:12 AM Date of Service: 3/10/2021  7:12 AM Status: Signed    : Macarena Trevizo CRNA (Nurse Anesthetist)       Addendum  created 03/10/21 0712 by Macarena Trevizo CRNA    Flowsheet accepted, Intraprocedure Flowsheets edited, Intraprocedure LDAs edited, LDA properties accepted

## 2021-08-03 ENCOUNTER — TELEPHONE (OUTPATIENT)
Dept: FAMILY MEDICINE | Facility: CLINIC | Age: 51
End: 2021-08-03

## 2021-08-03 ENCOUNTER — VIRTUAL VISIT (OUTPATIENT)
Dept: FAMILY MEDICINE | Facility: CLINIC | Age: 51
End: 2021-08-03
Payer: COMMERCIAL

## 2021-08-03 DIAGNOSIS — U07.1 2019 NOVEL CORONAVIRUS DISEASE (COVID-19): Primary | ICD-10-CM

## 2021-08-03 DIAGNOSIS — Z20.822 ENCOUNTER FOR LABORATORY TESTING FOR COVID-19 VIRUS: ICD-10-CM

## 2021-08-03 DIAGNOSIS — Z20.822 ENCOUNTER FOR LABORATORY TESTING FOR COVID-19 VIRUS: Primary | ICD-10-CM

## 2021-08-03 PROCEDURE — U0005 INFEC AGEN DETEC AMPLI PROBE: HCPCS

## 2021-08-03 PROCEDURE — 99213 OFFICE O/P EST LOW 20 MIN: CPT | Mod: 95 | Performed by: NURSE PRACTITIONER

## 2021-08-03 PROCEDURE — U0003 INFECTIOUS AGENT DETECTION BY NUCLEIC ACID (DNA OR RNA); SEVERE ACUTE RESPIRATORY SYNDROME CORONAVIRUS 2 (SARS-COV-2) (CORONAVIRUS DISEASE [COVID-19]), AMPLIFIED PROBE TECHNIQUE, MAKING USE OF HIGH THROUGHPUT TECHNOLOGIES AS DESCRIBED BY CMS-2020-01-R: HCPCS

## 2021-08-03 NOTE — TELEPHONE ENCOUNTER
Reason for call:  Patient reporting a symptom    Symptom or request: Body Aches, Cough, Fever & Sinus symptoms. His symptoms started Friday and exposed at work with Covid.  Pt was Vaccinated   Requesting to be tested to Covid.       Phone Number patient can be reached at:  Other phone number:  [phone]*    Best Time:  Any Time      Can we leave a detailed message on this number:  YES    Call taken on 8/3/2021 at 7:10 AM by Yady Schmitt

## 2021-08-03 NOTE — PROGRESS NOTES
"Ed is a 51 year old who is being evaluated via a billable video visit.      How would you like to obtain your AVS? Mail a copy  If the video visit is dropped, the invitation should be resent by: Text to cell phone: [phone]  Will anyone else be joining your video visit? No    Video Start Time:     Assessment & Plan     Encounter for laboratory testing for COVID-19 virus    - Symptomatic COVID-19 Virus (Coronavirus) by PCR; Future             BMI:   Estimated body mass index is 27.25 kg/m  as calculated from the following:    Height as of 4/13/21: 1.702 m (5' 7\").    Weight as of 4/13/21: 78.9 kg (174 lb).       See Patient Instructions  There are no Patient Instructions on file for this visit.    No follow-ups on file.    OLGA Hubbard CNP  [facility]   Ed is a 51 year old who presents for the following health issues  accompanied by his self:    HPI       Concern for COVID-19  About how many days ago did these symptoms start? X 4 days   Is this your first visit for this illness? Yes  In the 14 days before your symptoms started, have you had close contact with someone with COVID-19 (Coronavirus)? Yes, I have been in contact with someone who has COVID-19/Coronavirus (confirmed by lab test).  Do you have a fever or chills? Yes, the highest temperature was 102.0  Are you having new or worsening difficulty breathing? No  Do you have new or worsening cough? Yes, it's a dry cough.   Have you had any new or unexplained body aches? YES    Have you experienced any of the following NEW symptoms?    Headache: YES    Sore throat: YES    Loss of taste or smell: YES    Chest pain: YES    Diarrhea: No    Rash: No  What treatments have you tried? IBU and tylenol   Who do you live with? Wife   Are you, or a household member, a healthcare worker or a ? YES  Do you live in a nursing home, group home, or shelter? No  Do you have a way to get food/medications if " quarantined? Yes, I have a friend or family member who can help me.        Review of Systems   See above      Objective           Vitals:  No vitals were obtained today due to virtual visit.    Physical Exam   GENERAL: Healthy, alert and no distress                  Video-Visit Details    Type of service:  Video Visit, difficulty with connection, phone call placed to complete visit.    Video End Time:    Originating Location (pt. Location):     Distant Location (provider location):  [facility]     Platform used for Video Visit:

## 2021-08-04 ENCOUNTER — TELEPHONE (OUTPATIENT)
Dept: FAMILY MEDICINE | Facility: CLINIC | Age: 51
End: 2021-08-04

## 2021-08-04 LAB — SARS-COV-2 RNA RESP QL NAA+PROBE: POSITIVE

## 2021-08-04 NOTE — TELEPHONE ENCOUNTER
"-Coronavirus (COVID-19) Notification    Caller Name (Patient, parent, daughter/son, grandparent, etc)  Ed, patient    Reason for call  Notify of Positive Coronavirus (COVID-19) lab results, assess symptoms,  review  [employer] [place] recommendations    Lab Result    Lab test:  2019-nCoV rRt-PCR or SARS-CoV-2 PCR    Oropharyngeal AND/OR nasopharyngeal swabs is POSITIVE for 2019-nCoV RNA/SARS-COV-2 PCR (COVID-19 virus)    RN Recommendations/Instructions per [facility] Coronavirus COVID-19 recommendations    Brief introduction script  Introduce self then review script:  \"I am calling on behalf of [facility].  We were notified that your Coronavirus test (COVID-19) for was POSITIVE for the virus.  I have some information to relay to you but first I wanted to mention that the [place] Dept of Health will be contacting you shortly [it's possible MD already called Patient] to talk to you more about how you are feeling and other people you have had contact with who might now also have the virus.  Also, [facility] is Partnering with the [facility] for Covid-19 research, you may be contacted directly by research staff.\"    Assessment (Inquire about Patient's current symptoms)   Assessment   Current Symptoms at time of phone call: (if no symptoms, document No symptoms] Fever, chills, headache, bodyaches  Cough, Slight chest pain/shortness of breath,   Symptoms onset (if applicable) 7/30/2021     If at time of call, Patients symptoms hare worsened, the Patient should contact 911 or have someone drive them to Emergency Dept promptly:      If Patient calling 911, inform 911 personal that you have tested positive for the Coronavirus (COVID-19).  Place mask on and await 911 to arrive.    If Emergency Dept, If possible, please have another adult drive you to the Emergency Dept but you need to wear mask when in contact with other people.      Monoclonal Antibody Administration    You may be eligible to receive a " "new treatment with a monoclonal antibody for preventing hospitalization in patients at high risk for complications from COVID-19.   This medication is still experimental and available on a limited basis; it is given through an IV and must be given at an infusion center. Please note that not all people who are eligible will receive the medication since it is in limited supply.      Are you interested in being considered for this medication?  No.   Does the patient fit the criteria: No    If patient qualifies based on above criteria:  \"You will be contacted if you are selected to receive this treatment in the next 1-2 business days.   This is time sensitive and if you are not selected in the next 1-2 business days, you will not receive the medication.  If you do not receive a call to schedule, you have not been selected.\"      Review information with Patient    Your result was positive. This means you have COVID-19 (coronavirus).  We have sent you a letter that reviews the information that I'll be reviewing with you now.    How can I protect others?    If you have symptoms: stay home and away from others (self-isolate) until:    You've had no fever--and no medicine that reduces fever--for 1 full day (24 hours). And       Your other symptoms have gotten better. For example, your cough or breathing has improved. And     At least 10 days have passed since your symptoms started. (If you've been told by a doctor that you have a weak immune system, wait 20 days.)     If you don't have symptoms: Stay home and away from others (self-isolate) until at least 10 days have passed since your first positive COVID-19 test. (Date test collected)    During this time:    Stay in your own room, including for meals. Use your own bathroom if you can.    Stay away from others in your home. No hugging, kissing or shaking hands. No visitors.     Don't go to work, school or anywhere else.     Clean  high touch  surfaces often (doorknobs, " counters, handles, etc.). Use a household cleaning spray or wipes. You'll find a full list on the EPA website at www.epa.gov/pesticide-registration/list-n-disinfectants-use-against-sars-cov-2.     Cover your mouth and nose with a mask, tissue or other face covering to avoid spreading germs.    Wash your hands and face often with soap and water.    Make a list of people you have been in close contact with recently, even if either of you wore a face covering.   ; Start your list from 2 days before you became ill or had a positive test.  ; Include anyone that was within 6 feet of you for a cumulative total of 15 minutes or more in 24 hours. (Example: if you sat next to Sanket for 5 minutes in the morning and 10 minutes in the afternoon, then you were in close contact for 15 minutes total that day. Sanket would be added to your list.)    A public health worker will call or text you. It is important that you answer. They will ask you questions about possible exposures to COVID-19, such as people you have been in direct contact with and places you have visited.    Tell the people on your list that you have COVID-19; they should stay away from others for 14 days starting from the last time they were in contact with you (unless you are told something different from a public health worker).     Caregivers in these groups are at risk for severe illness due to COVID-19:  o People 65 years and older  o People who live in a nursing home or long-term care facility  o People with chronic disease (lung, heart, cancer, diabetes, kidney, liver, immunologic)  o People who have a weakened immune system, including those who:  - Are in cancer treatment  - Take medicine that weakens the immune system, such as corticosteroids  - Had a bone marrow or organ transplant  - Have an immune deficiency  - Have poorly controlled HIV or AIDS  - Are obese (body mass index of 40 or higher)  - Smoke regularly    Caregivers should wear gloves while washing  dishes, handling laundry and cleaning bedrooms and bathrooms.    Wash and dry laundry with special caution. Don't shake dirty laundry, and use the warmest water setting you can.    If you have a weakened immune system, ask your doctor about other actions you should take.    For more tips, go to www.cdc.gov/coronavirus/2019-ncov/downloads/10Things.pdf.    You should not go back to work until you meet the guidelines above for ending your home isolation. You don't need to be retested for COVID-19 before going back to work--studies show that you won't spread the virus if it's been at least 10 days since your symptoms started (or 20 days, if you have a weak immune system).    Employers: This document serves as formal notice of your employee's medical guidelines for going back to work. They must meet the above guidelines before going back to work in person.    How can I take care of myself?    1. Get lots of rest. Drink extra fluids (unless a doctor has told you not to).    2. Take Tylenol (acetaminophen) for fever or pain. If you have liver or kidney problems, ask your family doctor if it's okay to take Tylenol.     Take either:     650 mg (two 325 mg pills) every 4 to 6 hours, or     1,000 mg (two 500 mg pills) every 8 hours as needed.     Note: Don't take more than 3,000 mg in one day. Acetaminophen is found in many medicines (both prescribed and over-the-counter medicines). Read all labels to be sure you don't take too much.    For children, check the Tylenol bottle for the right dose (based on their age or weight).    3. If you have other health problems (like cancer, heart failure, an organ transplant or severe kidney disease): Call your specialty clinic if you don't feel better in the next 2 days.    4. Know when to call 911: Emergency warning signs include:    Trouble breathing or shortness of breath    Pain or pressure in the chest that doesn't go away    Feeling confused like you haven't felt before, or not  being able to wake up    Bluish-colored lips or face    5. Sign up for [employer]. We know it's scary to hear that you have COVID-19. We want to track your symptoms to make sure you're okay over the next 2 weeks. Please look for an email from [employer]--this is a free, online program that we'll use to keep in touch. To sign up, follow the link in the email. Learn more at [email or web address].    Where can I get more information?    [facility]: [email or web address]    Coronavirus Basics: www.health.[facility].[place]./diseases/coronavirus/basics.html    What to Do If You're Sick: www.cdc.gov/coronavirus/2019-ncov/about/steps-when-sick.html    Ending Home Isolation: www.cdc.gov/coronavirus/2019-ncov/hcp/disposition-in-home-patients.html     Caring for Someone with COVID-19: www.cdc.gov/coronavirus/2019-ncov/if-you-are-sick/care-for-someone.html     [facility] clinical trials (COVID-19 research studies): [email or web address].[email or web address]-clinical-trials     A Positive COVID-19 letter will be sent via [employer] or the mail. (Exception, no letters sent to Presurgerical/Preprocedure Patients)    Mary Lou Frias LPN

## 2021-08-04 NOTE — TELEPHONE ENCOUNTER
Coronavirus (COVID-19) Notification     Reason for call  Patient requesting results     Lab Result    Lab test 2019-nCoV rRt-PCR in process        RN Recommendations/Instructions per [facility]  Continue quarantee and following instructions until you receive the results     Please Contact your PCP clinic or return to the Emergency department if your:    Symptoms worsen or other concerning symptom's.     Patient informed that if test for COVID19 is POSITIVE,  you will receive a call typically within 48 hours from the test date (date lab collected).  If NEGATIVE result, you will receive a letter in the mail or [facility].      Kesha Masterson LPN

## 2021-09-04 ENCOUNTER — HEALTH MAINTENANCE LETTER (OUTPATIENT)
Age: 51
End: 2021-09-04

## 2021-12-30 ENCOUNTER — VIRTUAL VISIT (OUTPATIENT)
Dept: FAMILY MEDICINE | Facility: CLINIC | Age: 51
End: 2021-12-30
Payer: COMMERCIAL

## 2021-12-30 DIAGNOSIS — M54.12 CERVICAL RADICULOPATHY AT C8: Primary | ICD-10-CM

## 2021-12-30 PROCEDURE — 99213 OFFICE O/P EST LOW 20 MIN: CPT | Mod: 95 | Performed by: FAMILY MEDICINE

## 2021-12-30 RX ORDER — PREDNISONE 20 MG/1
TABLET ORAL
Qty: 11 TABLET | Refills: 0 | Status: SHIPPED | OUTPATIENT
Start: 2021-12-30 | End: 2022-10-14

## 2021-12-30 ASSESSMENT — PATIENT HEALTH QUESTIONNAIRE - PHQ9: SUM OF ALL RESPONSES TO PHQ QUESTIONS 1-9: 7

## 2021-12-30 NOTE — PROGRESS NOTES
"Ed is a 51 year old who is being evaluated via a billable video visit.      How would you like to obtain your AVS? MyChart  If the video visit is dropped, the invitation should be resent by: Text to cell phone: [phone]  Will anyone else be joining your video visit? No    Video Start Time: 2:40 PM    Assessment & Plan     Cervical radiculopathy at C8  Acute  Prednisone  MRI  Consider injection, physical therapy, gabapentin  - MR Cervical Spine w/o Contrast; Future  - predniSONE (DELTASONE) 20 MG tablet; 2 tabs daily x 3 days, then 1 tab daily x 3 days, then 1/2 tab daily x 3 days.     BMI:   Estimated body mass index is 27.25 kg/m  as calculated from the following:    Height as of 4/13/21: 1.702 m (5' 7\").    Weight as of 4/13/21: 78.9 kg (174 lb).   Not addressed        No follow-ups on file.    Yue Unger MD  [facility]   Ed is a 51 year old who presents for the following health issues     HPI     Shoulder Pain    Onset: 3-4 months    Description:   Location: right shoulder  Character: Sharp    Intensity: moderate    Progression of Symptoms: worse    Accompanying Signs & Symptoms:  Other symptoms: radiation of pain to right arm, numbness in fingers    History:   Previous similar pain: no       Precipitating factors:   Trauma or overuse: no     Alleviating factors:  Improved by: nothing    Therapies Tried and outcome: aleve, advil PM       Had trouble with both his left and right shoulder, had decompression surgery 2016 by Kevin.    Did ok until a few months ago, this pain is different.   Has pain is sharp in the mid shoulder, down into the elbow and into the two middle fingers, those are numb.   Is hard to sleep at night.   Neck hurts (is killing me)  Has pain going down his right arm, numbness into right 4th and 5th finger.     Had an MRI of the neck done in 2016  Level by level:     C2-C3: There is facet arthropathy bilaterally, uncinate " hypertrophy  bilaterally and a posterior broad-based disc-osteophyte complex. There  is no spinal canal or neural foraminal stenosis at this level.     C3-C4: There is facet arthropathy bilaterally, uncinate hypertrophy  bilaterally and a posterior broad-based disc-osteophyte complex. There  is no spinal canal or neural foraminal stenosis at this level.     C4-C5: There is facet arthropathy bilaterally, uncinate hypertrophy  bilaterally and a posterior broad-based disc-osteophyte complex. These  findings result in mild bilateral neural foraminal narrowing but no  spinal canal stenosis.     C5-C6: There is facet arthropathy bilaterally, uncinate hypertrophy  bilaterally and a posterior broad-based disc-osteophyte complex with a  superimposed small posterior central disc herniation (protrusion).  These findings result in mild bilateral neural foraminal narrowing and  minimal spinal canal narrowing.     C6-C7: There is facet arthropathy bilaterally, uncinate hypertrophy  bilaterally and a posterior broad-based disc-osteophyte complex with a  superimposed small posterior central disc herniation (protrusion).  These findings result in mild left foraminal narrowing, mild spinal  canal narrowing and minimal right foraminal narrowing.     C7-T1: There is no spinal canal or neural foraminal stenosis at this  level.        IMPRESSION: Degenerative changes of the cervical spine as described  above.     GLORIA WINTER MD    Review of Systems   No new bladder or bowel problems  No symptoms in legs      Objective           Vitals:  No vitals were obtained today due to virtual visit.    Physical Exam   GENERAL: Healthy, alert and no distress  EYES: Eyes grossly normal to inspection.  No discharge or erythema, or obvious scleral/conjunctival abnormalities.  RESP: No audible wheeze, cough, or visible cyanosis.  No visible retractions or increased work of breathing.    NEURO: Cranial nerves grossly intact.  Mentation and speech  appropriate for age.  PSYCH: Mentation appears normal, affect normal/bright, judgement and insight intact, normal speech and appearance well-groomed.  Neck: full range of motion, hurts to turn to left, pain with putting right arm behind trunk          Video-Visit Details    Type of service:  Video Visit    Video End Time:2:58 PM    Originating Location (pt. Location): Home    Distant Location (provider location):  [facility]     Platform used for Video Visit: Forrest

## 2021-12-30 NOTE — PATIENT INSTRUCTIONS
Course of prednisone to reduce inflammation of the nerve  Get an MRI of the neck  We'll go from there

## 2021-12-31 ENCOUNTER — HOSPITAL ENCOUNTER (OUTPATIENT)
Dept: MRI IMAGING | Facility: CLINIC | Age: 51
Discharge: HOME | End: 2021-12-31
Attending: FAMILY MEDICINE | Admitting: FAMILY MEDICINE
Payer: COMMERCIAL

## 2021-12-31 DIAGNOSIS — M54.12 CERVICAL RADICULOPATHY AT C8: ICD-10-CM

## 2021-12-31 PROCEDURE — 72141 MRI NECK SPINE W/O DYE: CPT

## 2022-01-25 ENCOUNTER — TRANSFERRED RECORDS (OUTPATIENT)
Dept: HEALTH INFORMATION MANAGEMENT | Facility: CLINIC | Age: 52
End: 2022-01-25
Payer: COMMERCIAL

## 2022-02-08 ENCOUNTER — TELEPHONE (OUTPATIENT)
Dept: FAMILY MEDICINE | Facility: CLINIC | Age: 52
End: 2022-02-08
Payer: COMMERCIAL

## 2022-02-08 DIAGNOSIS — M54.12 CERVICAL RADICULOPATHY AT C8: Primary | ICD-10-CM

## 2022-02-08 DIAGNOSIS — M54.2 NECK PAIN: ICD-10-CM

## 2022-02-09 NOTE — TELEPHONE ENCOUNTER
EMG is normal, so not sure what is going on, if still having problems I gave him referral to ortho spine. Would he be interested in physical therapy? Have him make follow up apt with me or Abigail (can be virtual for me)

## 2022-02-19 ENCOUNTER — HEALTH MAINTENANCE LETTER (OUTPATIENT)
Age: 52
End: 2022-02-19

## 2022-04-11 ENCOUNTER — APPOINTMENT (OUTPATIENT)
Dept: OCCUPATIONAL MEDICINE | Facility: CLINIC | Age: 52
End: 2022-04-11

## 2022-04-11 PROCEDURE — 99000 SPECIMEN HANDLING OFFICE-LAB: CPT | Performed by: FAMILY MEDICINE

## 2022-10-14 ENCOUNTER — OFFICE VISIT (OUTPATIENT)
Dept: FAMILY MEDICINE | Facility: CLINIC | Age: 52
End: 2022-10-14
Payer: COMMERCIAL

## 2022-10-14 VITALS
TEMPERATURE: 98.8 F | HEIGHT: 67 IN | RESPIRATION RATE: 18 BRPM | WEIGHT: 162 LBS | HEART RATE: 70 BPM | SYSTOLIC BLOOD PRESSURE: 124 MMHG | BODY MASS INDEX: 25.43 KG/M2 | DIASTOLIC BLOOD PRESSURE: 92 MMHG

## 2022-10-14 DIAGNOSIS — R21 RASH AND NONSPECIFIC SKIN ERUPTION: Primary | ICD-10-CM

## 2022-10-14 DIAGNOSIS — Z13.1 SCREENING FOR DIABETES MELLITUS: ICD-10-CM

## 2022-10-14 LAB — HBA1C MFR BLD: 5.3 % (ref 0–5.6)

## 2022-10-14 PROCEDURE — 36415 COLL VENOUS BLD VENIPUNCTURE: CPT | Performed by: FAMILY MEDICINE

## 2022-10-14 PROCEDURE — 83036 HEMOGLOBIN GLYCOSYLATED A1C: CPT | Performed by: FAMILY MEDICINE

## 2022-10-14 PROCEDURE — 99213 OFFICE O/P EST LOW 20 MIN: CPT | Performed by: FAMILY MEDICINE

## 2022-10-14 RX ORDER — TERBINAFINE HYDROCHLORIDE 250 MG/1
250 TABLET ORAL DAILY
Qty: 90 TABLET | Refills: 0 | Status: SHIPPED | OUTPATIENT
Start: 2022-10-14 | End: 2022-10-14

## 2022-10-14 RX ORDER — CLOTRIMAZOLE 1 %
CREAM (GRAM) TOPICAL 2 TIMES DAILY
Qty: 60 G | Refills: 0 | Status: SHIPPED | OUTPATIENT
Start: 2022-10-14 | End: 2022-10-28

## 2022-10-14 RX ORDER — TERBINAFINE HYDROCHLORIDE 250 MG/1
250 TABLET ORAL DAILY
Qty: 30 TABLET | Refills: 0 | Status: SHIPPED | OUTPATIENT
Start: 2022-10-14 | End: 2022-11-13

## 2022-10-14 ASSESSMENT — PAIN SCALES - GENERAL: PAINLEVEL: WORST PAIN (10)

## 2022-10-14 ASSESSMENT — PATIENT HEALTH QUESTIONNAIRE - PHQ9
SUM OF ALL RESPONSES TO PHQ QUESTIONS 1-9: 4
10. IF YOU CHECKED OFF ANY PROBLEMS, HOW DIFFICULT HAVE THESE PROBLEMS MADE IT FOR YOU TO DO YOUR WORK, TAKE CARE OF THINGS AT HOME, OR GET ALONG WITH OTHER PEOPLE: SOMEWHAT DIFFICULT
SUM OF ALL RESPONSES TO PHQ QUESTIONS 1-9: 4

## 2022-10-14 NOTE — PROGRESS NOTES
Assessment & Plan     Rash and nonspecific skin eruption  Differentials discussed in detail.  Suspect rashes secondary to fungal infection involving feet, leg and groin bilaterally.  Clotrimazole cream and terbinafine prescribed, common side effects discussed.  Follow-up in 2 weeks or earlier if needed  - clotrimazole (LOTRIMIN) 1 % external cream; Apply topically 2 times daily for 14 days  - terbinafine (LAMISIL) 250 MG tablet; Take 1 tablet (250 mg) by mouth daily for 30 days    Screening for diabetes mellitus  Family history of diabetes mellitus, hemoglobin A1c ordered for further evaluation  - Hemoglobin A1c; Future  - Hemoglobin A1c      Return in about 2 weeks (around 10/28/2022).    Esteban Barton MD  [facility]   Ed is a 52 year old, presenting for the following health issues:  Foot Problems      History of Present Illness       Reason for visit:  My feet are sweling and also severe pain  Symptom onset:  More than a month  Symptoms include:  Pain, swelling, itching   Symptom intensity:  Severe  Symptom progression:  Staying the same  Had these symptoms before:  Yes  Has tried/received treatment for these symptoms:  No  What makes it worse:  Walking  What makes it better:  Cream    He eats 0-1 servings of fruits and vegetables daily.He consumes 1 sweetened beverage(s) daily.He exercises with enough effort to increase his heart rate 9 or less minutes per day.  He exercises with enough effort to increase his heart rate 3 or less days per week.   He is taking medications regularly.    Today's PHQ-9         PHQ-9 Total Score: 4    PHQ-9 Q9 Thoughts of better off dead/self-harm past 2 weeks :   Not at all    How difficult have these problems made it for you to do your work, take care of things at home, or get along with other people: Somewhat difficult  Using moisturizer regularly    Review of Systems   Constitutional, HEENT, cardiovascular, pulmonary, gi and gu systems  "are negative, except as otherwise noted.      Objective    BP (!) 124/92 (Cuff Size: Adult Regular)   Pulse 70   Temp 98.8  F (37.1  C) (Tympanic)   Resp 18   Ht 1.702 m (5' 7\")   Wt 73.5 kg (162 lb)   BMI 25.37 kg/m    Body mass index is 25.37 kg/m .  Physical Exam   GENERAL: alert and no distress  RESP: no wheezes  SKIN: dry erythematous rashes involving groin, lower legs, eczematous plantar foot skin involving interdigital spaces as shown below, onychomycosis involving multiple toenail plates, no pedal edema noted  NEURO: normal strength and tone, mentation intact and speech normal  PSYCH: mentation appears normal, affect normal/bright                                "

## 2022-10-14 NOTE — NURSING NOTE
"Chief Complaint   Patient presents with     Foot Problems     BP (!) 124/92 (Cuff Size: Adult Regular)   Pulse 70   Temp 98.8  F (37.1  C) (Tympanic)   Resp 18   Ht 1.702 m (5' 7\")   Wt 73.5 kg (162 lb)   BMI 25.37 kg/m   Estimated body mass index is 25.37 kg/m  as calculated from the following:    Height as of this encounter: 1.702 m (5' 7\").    Weight as of this encounter: 73.5 kg (162 lb).  Patient presents to the clinic using No DME      Health Maintenance that is potentially due pending provider review:    Health Maintenance Due   Topic Date Due     YEARLY PREVENTIVE VISIT  Never done     ANNUAL REVIEW OF HM ORDERS  Never done     ADVANCE CARE PLANNING  Never done     HEPATITIS B IMMUNIZATION (1 of 3 - 3-dose series) Never done     COLORECTAL CANCER SCREENING  Never done     COVID-19 Vaccine (3 - Booster for Neil series) 02/09/2022     INFLUENZA VACCINE (1) 09/01/2022     ZOSTER IMMUNIZATION (1 of 2) 05/16/2022                "

## 2022-10-22 ENCOUNTER — HEALTH MAINTENANCE LETTER (OUTPATIENT)
Age: 52
End: 2022-10-22

## 2022-10-28 ENCOUNTER — OFFICE VISIT (OUTPATIENT)
Dept: FAMILY MEDICINE | Facility: CLINIC | Age: 52
End: 2022-10-28
Payer: COMMERCIAL

## 2022-10-28 VITALS
HEART RATE: 80 BPM | DIASTOLIC BLOOD PRESSURE: 84 MMHG | HEIGHT: 67 IN | RESPIRATION RATE: 18 BRPM | WEIGHT: 162 LBS | TEMPERATURE: 98.3 F | SYSTOLIC BLOOD PRESSURE: 110 MMHG | BODY MASS INDEX: 25.43 KG/M2

## 2022-10-28 DIAGNOSIS — R21 RASH AND NONSPECIFIC SKIN ERUPTION: Primary | ICD-10-CM

## 2022-10-28 PROCEDURE — 99213 OFFICE O/P EST LOW 20 MIN: CPT | Performed by: FAMILY MEDICINE

## 2022-10-28 RX ORDER — CLOTRIMAZOLE AND BETAMETHASONE DIPROPIONATE 10; .64 MG/G; MG/G
CREAM TOPICAL 2 TIMES DAILY
Qty: 45 G | Refills: 1 | Status: SHIPPED | OUTPATIENT
Start: 2022-10-28 | End: 2022-11-11

## 2022-10-28 ASSESSMENT — PAIN SCALES - GENERAL: PAINLEVEL: WORST PAIN (10)

## 2022-10-28 NOTE — PROGRESS NOTES
"  Assessment & Plan     Rash and nonspecific skin eruption  Patient was seen 2 weeks ago for groin and feet rashes, prescribed clotrimazole and terbinafine for suspected fungal infeciton.  Bilateral groin rashes improved, feet rashes improving however not resolved completely, complains of significant itching.  Physical examination as described.  Lotrisone cream prescribed to cover dermatitis and tinea pedis, suggested to continue oral terbinafine antifungal course.  Patient will update through MyChart in about 2 weeks.  Suggested to schedule appointment with dermatology as well, referral placed.  All questions answered.  - clotrimazole-betamethasone (LOTRISONE) 1-0.05 % external cream; Apply topically 2 times daily for 14 days  - Adult Dermatology Referral; Future       Esteban Barton MD  [facility]   Ed is a 52 year old, presenting for the following health issues:  Foot Problems      History of Present Illness       Reason for visit:  Follow-up foot issues  Symptom onset:  1-2 weeks ago  Symptoms include:  Maybe a little better, but not much   Symptom intensity:  Moderate  Symptom progression:  Staying the same    He eats 0-1 servings of fruits and vegetables daily.He consumes 1 sweetened beverage(s) daily.He exercises with enough effort to increase his heart rate 9 or less minutes per day.  He exercises with enough effort to increase his heart rate 3 or less days per week.   He is taking medications regularly.  groin rashes resolved       Review of Systems   Constitutional, HEENT, cardiovascular, pulmonary, GI, , musculoskeletal, neuro, skin, endocrine and psych systems are negative, except as otherwise noted.      Objective    /84 (Cuff Size: Adult Regular)   Pulse 80   Temp 98.3  F (36.8  C) (Tympanic)   Resp 18   Ht 1.702 m (5' 7\")   Wt 73.5 kg (162 lb)   BMI 25.37 kg/m    Body mass index is 25.37 kg/m .  Physical Exam   GENERAL: alert and no " distress  NECK: no adenopathy, no asymmetry, masses, or scars and thyroid normal to palpation  RESP: No wheeze  SKIN: Groin rash resolved, dry eczematous skin changes involving bilateral feet, multiple interdigital spaces and lower legs, no vesicles or discharge noted  NEURO: Normal strength and tone, mentation intact and speech normal  PSYCH: mentation appears normal, affect normal/bright    Today         2 weeks ago

## 2022-10-28 NOTE — NURSING NOTE
"Chief Complaint   Patient presents with     Foot Problems     /84 (Cuff Size: Adult Regular)   Pulse 80   Temp 98.3  F (36.8  C) (Tympanic)   Resp 18   Ht 1.702 m (5' 7\")   Wt 73.5 kg (162 lb)   BMI 25.37 kg/m   Estimated body mass index is 25.37 kg/m  as calculated from the following:    Height as of this encounter: 1.702 m (5' 7\").    Weight as of this encounter: 73.5 kg (162 lb).  Patient presents to the clinic using No DME      Health Maintenance that is potentially due pending provider review:    Health Maintenance Due   Topic Date Due     YEARLY PREVENTIVE VISIT  Never done     ANNUAL REVIEW OF HM ORDERS  Never done     ADVANCE CARE PLANNING  Never done     HEPATITIS B IMMUNIZATION (1 of 3 - 3-dose series) Never done     COLORECTAL CANCER SCREENING  Never done     COVID-19 Vaccine (3 - Booster for Neil series) 02/09/2022     INFLUENZA VACCINE (1) Never done     ZOSTER IMMUNIZATION (1 of 2) 05/16/2022                "

## 2023-03-03 ENCOUNTER — OFFICE VISIT (OUTPATIENT)
Dept: DERMATOLOGY | Facility: CLINIC | Age: 53
End: 2023-03-03
Attending: FAMILY MEDICINE
Payer: COMMERCIAL

## 2023-03-03 DIAGNOSIS — L30.9 DERMATITIS: Primary | ICD-10-CM

## 2023-03-03 DIAGNOSIS — L73.9 FOLLICULITIS: ICD-10-CM

## 2023-03-03 DIAGNOSIS — R21 RASH AND NONSPECIFIC SKIN ERUPTION: ICD-10-CM

## 2023-03-03 PROCEDURE — 99203 OFFICE O/P NEW LOW 30 MIN: CPT | Performed by: PHYSICIAN ASSISTANT

## 2023-03-03 RX ORDER — CLINDAMYCIN PHOSPHATE 11.9 MG/ML
SOLUTION TOPICAL
Qty: 60 ML | Refills: 3 | Status: SHIPPED | OUTPATIENT
Start: 2023-03-03

## 2023-03-03 RX ORDER — FLUOCINONIDE 0.5 MG/G
CREAM TOPICAL
Qty: 30 G | Refills: 5 | Status: SHIPPED | OUTPATIENT
Start: 2023-03-03

## 2023-03-03 ASSESSMENT — PAIN SCALES - GENERAL: PAINLEVEL: NO PAIN (0)

## 2023-03-03 NOTE — PROGRESS NOTES
"Dann Castillo is an extremely pleasant 52 year old year old male patient here today for rash on foot, rash on scalp, and rash on arms. He notes rash on foot resolved with lamisil and lotrimin. He notes he is still having \"hot sensation\" on ball of right foot. No rash, no sweating, just heat/burning sensation. He notes he will also get rash on forearms with sun exposure. He reports that he received a steroid cream in the past which did help treat the rash. He usually try to avoid sun with long sleeves. He also rash on scalp that looks like pimples. He just changed to a head and shoulders shampoo yesterday.  Patient has no other skin complaints today.  Remainder of the HPI, Meds, PMH, Allergies, FH, and SH was reviewed in chart.    History reviewed. No pertinent past medical history.    Past Surgical History:   Procedure Laterality Date     AMPUTATE FINGER(S) Left 2/2/2018    Procedure: AMPUTATE FINGER(S);  Left long finger revision amputation.;  Surgeon: Denise Lyle MD;  Location: [facility]     AMPUTATE FINGER(S)       ANTERIOR CRUCIATE LIGAMENT REPAIR       ARTHROTOMY SHOULDER, ROTATOR CUFF REPAIR, COMBINED Right 5/2/2016    Procedure: COMBINED ARTHROTOMY SHOULDER, ROTATOR CUFF REPAIR;  Surgeon: Rafael Pacheco MD;  Location: [facility]     ARTHROTOMY SHOULDER, SUBACROMIAL DECOMPRESSION, COMBINED Left 6/13/2016    Procedure: COMBINED ARTHROTOMY SHOULDER, SUBACROMIAL DECOMPRESSION;  Surgeon: Rafael Pacheco MD;  Location: [facility]     DESTRUCTION OF PARAVERTEBRAL FACETCERVICAL / THORACIC SINGLE Right 8/25/2017    Procedure: DESTRUCTION OF PARAVERTEBRAL FACET CERVICAL / THORACIC SINGLE;  Right Radiofrequency Ablation of the Cervical 3rd occipital nerve, C3. 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      HC FULL THICK GRAFT HEAD/AXIL/GEN/HND/FT " <=20 CM  2003    Full thickness skin graft to the donor site of the cross finger flap      HEMORRHOIDECTOMY EXTERNAL N/A 2021    Procedure: EXAM UNDER ANESTHESIA WITH HEMORRHOIDECTOMY X2;  Surgeon: Gwen Vargas MD;  Location: [facility];  Service: General     DE ANAL SPHINCTEROTOMY N/A 3/9/2021    Procedure: EXAM UNDER ANESTHESIA LATERAL INTERNAL SPHINCTEROTOMY;  Surgeon: Gwen Vargas MD;  Location: [facility];  Service: General     SHOULDER SURGERY Bilateral      SURGICAL HISTORY OF -   2003    Division of left cross finger flap     SURGICAL HISTORY OF -       left ACL repair and partial menisectomy        Family History   Problem Relation Age of Onset     Heart Disease Mother      Diabetes Father      Cerebrovascular Disease Father         during surgery     Colon Cancer Father      Depression Sister      Obesity Sister      Hypertension Sister      Obesity Sister      Diabetes Sister      Hypertension Sister      Depression Sister      Anxiety Disorder Sister        Social History     Socioeconomic History     Marital status:      Spouse name: Not on file     Number of children: Not on file     Years of education: Not on file     Highest education level: Not on file   Occupational History     Not on file   Tobacco Use     Smoking status: Former     Packs/day: 1.50     Years: 17.00     Pack years: 25.50     Types: Cigarettes     Quit date: 2005     Years since quittin.7     Smokeless tobacco: Never     Tobacco comments:     around second hand smoke daily   Vaping Use     Vaping Use: Never used   Substance and Sexual Activity     Alcohol use: Yes     Comment: occ     Drug use: Not Currently     Sexual activity: Yes     Partners: Female   Other Topics Concern     Parent/sibling w/ CABG, MI or angioplasty before 65F 55M? Not Asked   Social History Narrative     Not on file     Social Determinants of Health     Financial Resource Strain: Not on file   Food  Insecurity: Not on file   Transportation Needs: Not on file   Physical Activity: Not on file   Stress: Not on file   Social Connections: Not on file   Intimate Partner Violence: Not on file   Housing Stability: Not on file       Outpatient Encounter Medications as of 3/3/2023   Medication Sig Dispense Refill     albuterol (PROAIR HFA/PROVENTIL HFA/VENTOLIN HFA) 108 (90 Base) MCG/ACT inhaler Inhale 2 puffs into the lungs every 4 hours as needed for shortness of breath / dyspnea or wheezing 1 Inhaler 3     clindamycin (CLEOCIN T) 1 % external solution Apply twice daily to scalp. 60 mL 3     EPINEPHrine (ANY BX GENERIC EQUIV) 0.3 MG/0.3ML injection 2-pack Inject 0.3 mLs (0.3 mg) into the muscle once as needed for anaphylaxis 0.6 mL 0     fluocinonide (LIDEX) 0.05 % external cream Apply sparingly twice daily as needed to rash to arms 30 g 5     fluticasone (FLONASE) 50 MCG/ACT nasal spray Spray 1-2 sprays into both nostrils daily 16 mL 11     levocetirizine (XYZAL) 5 MG tablet Take 1 tablet (5 mg) by mouth every evening 30 tablet 11     No facility-administered encounter medications on file as of 3/3/2023.             O:   NAD, WDWN, Alert & Oriented, Mood & Affect wnl, Vitals stable   Here today alone   There were no vitals taken for this visit.   General appearance normal   Vitals stable   Alert, oriented and in no acute distress     No rash on feet  Few inflammatory papules on scalp  No rash on arms    Eyes: Conjunctivae/lids:Normal     ENT: Lips: normal    MSK:Normal    Pulm: Breathing Normal    Neuro/Psych: Orientation:Alert and Orientedx3 ; Mood/Affect:normal   A/P:  1. Folliculitis on scalp   Continue shampoo or try Neutrogena T- sal shampoo.   Use clindamycin solution twice daily to scalp.     2. Burning foot- favor neuropathy  For foot: recommend follow-up with pcp, favor neuropathy as possible cause since no skin findings    3. 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   Apply fluocinonide cream twice daily if rash is present.

## 2023-03-03 NOTE — PATIENT INSTRUCTIONS
Folliculitis on scalp   Continue shampoo or try Neutrogena T- sal shampoo.   Use clindamycin solution twice daily to scalp.     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   Apply fluocinonide cream twice daily if rash is present.

## 2023-03-03 NOTE — LETTER
"    3/3/2023         RE: Dann Castillo  [id number] [address] [facility] [address]        Dear Colleague,    Thank you for referring your patient, Dann Castillo, to the [facility]. Please see a copy of my visit note below.    Dann Castillo is an extremely pleasant 52 year old year old male patient here today for rash on foot, rash on scalp, and rash on arms. He notes rash on foot resolved with lamisil and lotrimin. He notes he is still having \"hot sensation\" on ball of right foot. No rash, no sweating, just heat/burning sensation. He notes he will also get rash on forearms with sun exposure. He reports that he received a steroid cream in the past which did help treat the rash. He usually try to avoid sun with long sleeves. He also rash on scalp that looks like pimples. He just changed to a head and shoulders shampoo yesterday.  Patient has no other skin complaints today.  Remainder of the HPI, Meds, PMH, Allergies, FH, and SH was reviewed in chart.    History reviewed. No pertinent past medical history.    Past Surgical History:   Procedure Laterality Date     AMPUTATE FINGER(S) Left 2/2/2018    Procedure: AMPUTATE FINGER(S);  Left long finger revision amputation.;  Surgeon: Denise Lyle MD;  Location: [facility]     AMPUTATE FINGER(S)       ANTERIOR CRUCIATE LIGAMENT REPAIR       ARTHROTOMY SHOULDER, ROTATOR CUFF REPAIR, COMBINED Right 5/2/2016    Procedure: COMBINED ARTHROTOMY SHOULDER, ROTATOR CUFF REPAIR;  Surgeon: Rafael Pacheco MD;  Location: [facility]     ARTHROTOMY SHOULDER, SUBACROMIAL DECOMPRESSION, COMBINED Left 6/13/2016    Procedure: COMBINED ARTHROTOMY SHOULDER, SUBACROMIAL DECOMPRESSION;  Surgeon: Rafael Pacheco MD;  Location: [facility]     DESTRUCTION OF PARAVERTEBRAL FACETCERVICAL / THORACIC SINGLE Right 8/25/2017    Procedure: DESTRUCTION OF PARAVERTEBRAL FACET CERVICAL / THORACIC SINGLE;  Right Radiofrequency Ablation of the Cervical 3rd occipital nerve, C3. 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      HC FULL THICK GRAFT HEAD/AXIL/GEN/HND/FT <=20 CM  2003    Full thickness skin graft to the donor site of the cross finger flap      HEMORRHOIDECTOMY EXTERNAL N/A 2021    Procedure: EXAM UNDER ANESTHESIA WITH HEMORRHOIDECTOMY X2;  Surgeon: Gwen Vargas MD;  Location: [facility];  Service: General     NC ANAL SPHINCTEROTOMY N/A 3/9/2021    Procedure: EXAM UNDER ANESTHESIA LATERAL INTERNAL SPHINCTEROTOMY;  Surgeon: Gwen Vargas MD;  Location: [facility];  Service: General     SHOULDER SURGERY Bilateral      SURGICAL HISTORY OF -   2003    Division of left cross finger flap     SURGICAL HISTORY OF -       left ACL repair and partial menisectomy        Family History   Problem Relation Age of Onset     Heart Disease Mother      Diabetes Father      Cerebrovascular Disease Father         during surgery     Colon Cancer Father      Depression Sister      Obesity Sister      Hypertension Sister      Obesity Sister      Diabetes Sister      Hypertension Sister      Depression Sister      Anxiety Disorder Sister        Social History     Socioeconomic History     Marital status:      Spouse name: Not on file     Number of children: Not on file     Years of education: Not on file     Highest education level: Not on file   Occupational History     Not on file   Tobacco Use     Smoking status: Former     Packs/day: 1.50     Years: 17.00     Pack years: 25.50     Types: Cigarettes     Quit date: 2005     Years since quittin.7     Smokeless tobacco: Never     Tobacco comments:     around second hand smoke daily   Vaping Use     Vaping Use: Never used   Substance and Sexual Activity     Alcohol use: Yes     Comment: occ     Drug use: Not Currently     Sexual activity: Yes      Partners: Female   Other Topics Concern     Parent/sibling w/ CABG, MI or angioplasty before 65F 55M? Not Asked   Social History Narrative     Not on file     Social Determinants of Health     Financial Resource Strain: Not on file   Food Insecurity: Not on file   Transportation Needs: Not on file   Physical Activity: Not on file   Stress: Not on file   Social Connections: Not on file   Intimate Partner Violence: Not on file   Housing Stability: Not on file       Outpatient Encounter Medications as of 3/3/2023   Medication Sig Dispense Refill     albuterol (PROAIR HFA/PROVENTIL HFA/VENTOLIN HFA) 108 (90 Base) MCG/ACT inhaler Inhale 2 puffs into the lungs every 4 hours as needed for shortness of breath / dyspnea or wheezing 1 Inhaler 3     clindamycin (CLEOCIN T) 1 % external solution Apply twice daily to scalp. 60 mL 3     EPINEPHrine (ANY BX GENERIC EQUIV) 0.3 MG/0.3ML injection 2-pack Inject 0.3 mLs (0.3 mg) into the muscle once as needed for anaphylaxis 0.6 mL 0     fluocinonide (LIDEX) 0.05 % external cream Apply sparingly twice daily as needed to rash to arms 30 g 5     fluticasone (FLONASE) 50 MCG/ACT nasal spray Spray 1-2 sprays into both nostrils daily 16 mL 11     levocetirizine (XYZAL) 5 MG tablet Take 1 tablet (5 mg) by mouth every evening 30 tablet 11     No facility-administered encounter medications on file as of 3/3/2023.             O:   NAD, WDWN, Alert & Oriented, Mood & Affect wnl, Vitals stable   Here today alone   There were no vitals taken for this visit.   General appearance normal   Vitals stable   Alert, oriented and in no acute distress     No rash on feet  Few inflammatory papules on scalp  No rash on arms    Eyes: Conjunctivae/lids:Normal     ENT: Lips: normal    MSK:Normal    Pulm: Breathing Normal    Neuro/Psych: Orientation:Alert and Orientedx3 ; Mood/Affect:normal   A/P:  1. Folliculitis on scalp   Continue shampoo or try Neutrogena T- sal shampoo.   Use clindamycin solution twice  daily to scalp.     2. Burning foot- favor neuropathy  For foot: recommend follow-up with pcp, favor neuropathy as possible cause since no skin findings    3. 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   Apply fluocinonide cream twice daily if rash is present.       Again, thank you for allowing me to participate in the care of your patient.        Sincerely,        Kesha Bullock PA-C

## 2023-03-03 NOTE — Clinical Note
Jermaine Hayes, Chava VICENTE, I saw Ed for foot rash, which had resolved with lamisil, he also has had burning/heat sensation on ball of right foot, present daily for months. I was thinking this maybe neuropathy and that he should follow-up with you to be evaluated.  Sincerely, Kesha Sanchez PA-C

## 2023-04-01 ENCOUNTER — HEALTH MAINTENANCE LETTER (OUTPATIENT)
Age: 53
End: 2023-04-01

## 2023-06-12 ENCOUNTER — TELEPHONE (OUTPATIENT)
Dept: FAMILY MEDICINE | Facility: CLINIC | Age: 53
End: 2023-06-12
Payer: COMMERCIAL

## 2023-06-12 NOTE — TELEPHONE ENCOUNTER
Patient Quality Outreach    Patient is due for the following:   Colon Cancer Screening  Physical Preventive Adult Physical    Next Steps:   Schedule a Adult Preventative discuss colon cancer screening    Type of outreach:    Sent [employer] message.      Questions for provider review:    None           Yanelis Oates CMA

## 2024-01-25 ENCOUNTER — APPOINTMENT (OUTPATIENT)
Dept: OCCUPATIONAL MEDICINE | Facility: CLINIC | Age: 54
End: 2024-01-25

## 2024-01-25 PROCEDURE — 99000 SPECIMEN HANDLING OFFICE-LAB: CPT | Performed by: NURSE PRACTITIONER

## 2024-05-02 ENCOUNTER — OFFICE VISIT (OUTPATIENT)
Dept: FAMILY MEDICINE | Facility: CLINIC | Age: 54
End: 2024-05-02
Payer: COMMERCIAL

## 2024-05-02 VITALS
DIASTOLIC BLOOD PRESSURE: 84 MMHG | SYSTOLIC BLOOD PRESSURE: 130 MMHG | HEIGHT: 67 IN | OXYGEN SATURATION: 96 % | BODY MASS INDEX: 27.15 KG/M2 | HEART RATE: 105 BPM | TEMPERATURE: 99.3 F | RESPIRATION RATE: 18 BRPM | WEIGHT: 173 LBS

## 2024-05-02 DIAGNOSIS — B35.1 ONYCHOMYCOSIS: ICD-10-CM

## 2024-05-02 DIAGNOSIS — Z87.09 HISTORY OF ASTHMA: ICD-10-CM

## 2024-05-02 DIAGNOSIS — Z12.11 SCREEN FOR COLON CANCER: ICD-10-CM

## 2024-05-02 DIAGNOSIS — N50.82 SCROTAL PAIN: ICD-10-CM

## 2024-05-02 DIAGNOSIS — J30.2 SEASONAL ALLERGIC RHINITIS, UNSPECIFIED TRIGGER: ICD-10-CM

## 2024-05-02 DIAGNOSIS — K64.9 HEMORRHOIDS, UNSPECIFIED HEMORRHOID TYPE: Primary | ICD-10-CM

## 2024-05-02 DIAGNOSIS — R06.83 SNORING: ICD-10-CM

## 2024-05-02 PROCEDURE — 99214 OFFICE O/P EST MOD 30 MIN: CPT | Performed by: FAMILY MEDICINE

## 2024-05-02 RX ORDER — ALBUTEROL SULFATE 90 UG/1
2 AEROSOL, METERED RESPIRATORY (INHALATION) EVERY 4 HOURS PRN
Qty: 18 G | Refills: 1 | Status: SHIPPED | OUTPATIENT
Start: 2024-05-02

## 2024-05-02 ASSESSMENT — PATIENT HEALTH QUESTIONNAIRE - PHQ9
SUM OF ALL RESPONSES TO PHQ QUESTIONS 1-9: 12
10. IF YOU CHECKED OFF ANY PROBLEMS, HOW DIFFICULT HAVE THESE PROBLEMS MADE IT FOR YOU TO DO YOUR WORK, TAKE CARE OF THINGS AT HOME, OR GET ALONG WITH OTHER PEOPLE: EXTREMELY DIFFICULT
SUM OF ALL RESPONSES TO PHQ QUESTIONS 1-9: 12

## 2024-05-02 ASSESSMENT — PAIN SCALES - GENERAL: PAINLEVEL: NO PAIN (0)

## 2024-05-02 NOTE — PROGRESS NOTES
"  Assessment & Plan     Hemorrhoids, unspecified hemorrhoid type  Known to have hemorrhoids, symptoms ongoing, history of hemorrhoid surgery in 2019.  Recommended regular walks, healthy diet, increasing fiber in diet, warm sitz bath and general surgery referral placed  - Adult General Surg Referral; Future    Screen for colon cancer  Screening colonoscopy ordered  - Colonoscopy Screening  Referral; Future    Scrotal pain  Complains of intermittent scrotal pain.  Ultrasound ordered for further evaluation  - US Testicular & Scrotum w Doppler Ltd; Future    Snoring  Sleep medicine referral placed  - Adult Sleep Eval & Management Referral; Future    Onychomycosis  Involving left foot great toenail plate, tried Lamisil in the past.  Podiatry referral placed  - Orthopedic  Referral; Future    Seasonal allergic rhinitis, unspecified trigger  Symptoms stable.  Albuterol inhaler refilled  - albuterol (PROAIR HFA/PROVENTIL HFA/VENTOLIN HFA) 108 (90 Base) MCG/ACT inhaler; Inhale 2 puffs into the lungs every 4 hours as needed for shortness of breath or wheezing    History of asthma  - albuterol (PROAIR HFA/PROVENTIL HFA/VENTOLIN HFA) 108 (90 Base) MCG/ACT inhaler; Inhale 2 puffs into the lungs every 4 hours as needed for shortness of breath or wheezing      BMI  Estimated body mass index is 27.1 kg/m  as calculated from the following:    Height as of this encounter: 1.702 m (5' 7\").    Weight as of this encounter: 78.5 kg (173 lb).   Weight management plan: Discussed healthy diet and exercise guidelines          Subjective   Ed is a 53 year old, presenting for the following health issues:  Personal Problem    History of Present Illness       Reason for visit:  Personal    He eats 2-3 servings of fruits and vegetables daily.He consumes 2 sweetened beverage(s) daily.He exercises with enough effort to increase his heart rate 9 or less minutes per day.  He exercises with enough effort to increase his heart rate 3 " "or less days per week.   He is taking medications regularly.       Review of Systems  Constitutional, neuro, ENT, endocrine, pulmonary, cardiac, gastrointestinal, genitourinary, musculoskeletal, integument and psychiatric systems are negative, except as otherwise noted.      Objective    /84 (Cuff Size: Adult Regular)   Pulse 105   Temp 99.3  F (37.4  C) (Tympanic)   Resp 18   Ht 1.702 m (5' 7\")   Wt 78.5 kg (173 lb)   SpO2 96%   BMI 27.10 kg/m    Body mass index is 27.1 kg/m .  Physical Exam   GENERAL: alert and no distress  NECK: no adenopathy, no asymmetry, masses, or scars  RESP: lungs clear to auscultation - no rales, rhonchi or wheezes  CV: regular rate and rhythm, normal S1 S2, no S3 or S4, no murmur, click or rub, no peripheral edema  ABDOMEN: soft, nontender, no hepatosplenomegaly, no masses and bowel sounds normal   (male): normal male genitalia without lesions or urethral discharge, no hernia  RECTAL (male): External exam only: Small external hemorrhoid noted, no bleeding or discharge  MS: no gross musculoskeletal defects noted, no edema, onychomycosis involving left foot great toenail plate  NEURO: Normal strength and tone, mentation intact and speech normal  PSYCH: mentation appears normal, affect normal/bright      Signed Electronically by: Esteban Barton MD    "

## 2024-05-02 NOTE — NURSING NOTE
"Chief Complaint   Patient presents with    Personal Problem     /84 (Cuff Size: Adult Regular)   Pulse 105   Temp 99.3  F (37.4  C) (Tympanic)   Resp 18   Ht 1.702 m (5' 7\")   Wt 78.5 kg (173 lb)   SpO2 96%   BMI 27.10 kg/m   Estimated body mass index is 27.1 kg/m  as calculated from the following:    Height as of this encounter: 1.702 m (5' 7\").    Weight as of this encounter: 78.5 kg (173 lb).  Patient presents to the clinic using No DME      Health Maintenance that is potentially due pending provider review:    Health Maintenance Due   Topic Date Due    YEARLY PREVENTIVE VISIT  Never done    ANNUAL REVIEW OF HM ORDERS  Never done    COLORECTAL CANCER SCREENING  Never done    HEPATITIS B IMMUNIZATION (1 of 3 - 19+ 3-dose series) Never done    ZOSTER IMMUNIZATION (1 of 2) Never done    COVID-19 Vaccine (3 - 2023-24 season) 09/01/2023    GLUCOSE  02/26/2024                  "

## 2024-05-17 ENCOUNTER — OFFICE VISIT (OUTPATIENT)
Dept: SURGERY | Facility: CLINIC | Age: 54
End: 2024-05-17
Attending: FAMILY MEDICINE
Payer: COMMERCIAL

## 2024-05-17 VITALS
HEIGHT: 67 IN | HEART RATE: 69 BPM | TEMPERATURE: 98.9 F | OXYGEN SATURATION: 98 % | DIASTOLIC BLOOD PRESSURE: 92 MMHG | WEIGHT: 173 LBS | BODY MASS INDEX: 27.15 KG/M2 | SYSTOLIC BLOOD PRESSURE: 141 MMHG

## 2024-05-17 DIAGNOSIS — K62.89 ANAL OR RECTAL PAIN: Primary | ICD-10-CM

## 2024-05-17 DIAGNOSIS — K64.9 HEMORRHOIDS, UNSPECIFIED HEMORRHOID TYPE: ICD-10-CM

## 2024-05-17 PROCEDURE — 99203 OFFICE O/P NEW LOW 30 MIN: CPT | Performed by: SURGERY

## 2024-05-17 ASSESSMENT — PAIN SCALES - GENERAL: PAINLEVEL: MILD PAIN (2)

## 2024-05-17 NOTE — PROGRESS NOTES
Assessment & Plan   Problem List Items Addressed This Visit    None  Visit Diagnoses       Anal or rectal pain    -  Primary    Relevant Medications    COMPOUNDED NON-CONTROLLED SUBSTANCE (CMPD RX) - PHARMACY TO MIX COMPOUNDED MEDICATION    Hemorrhoids, unspecified hemorrhoid type        Relevant Medications    COMPOUNDED NON-CONTROLLED SUBSTANCE (CMPD RX) - PHARMACY TO MIX COMPOUNDED MEDICATION           53 yo M with anal pain  -Pt barely tolerated the KRISTI.  There was focal tender at the posterior midline.  This might be a healing fissure  -No anoscopy was done as pt was not able to tolerate this.  KRISTI did not reveal firm mass or anything concerning  -I recommend treating the anal fissure with nifedipine ointment with lidocaine twice daily for the next 6 to 8 weeks.  -We also talked about daily fiber intake, not to sit on the toilet for more than 5 minutes at a time, and cannot sit on a donut while he is driving.  -Patient is to notify me if he still having pain in the next 4 to 6 weeks.  I will refer him to colorectal surgery if the cream does not help.  -All of his questions were answered.    Face to Face/patient Contact total time: 30 minutes  Pre Charting time: 5 minutes; Post charting time, communication and other activities: 5 minutes;   Total time:  40 minutes      No follow-ups on file.      Subjective   Ed is a 54 year old, presenting for the following health issues:  Consult and Rectal Problem    Hemorrhoidectomy of LL and RPL hemorrhoids Dr. Vargas in 2021  Started having anal pain after that  Been putting it off ever since.  Noted intermittent pain of the anus.  Mostly after and during a BM.  Noted some blood especially after he wipes.    Has daily soft BMs; takes fiber supplement daily  Pt is a ; does sit on a donut and this helps with his pain.   Pt is going to schedule a colonoscopy soon  No famhx of colon cancer.           Review of Systems  Constitutional, neuro, ENT, endocrine,  "pulmonary, cardiac, gastrointestinal, genitourinary, musculoskeletal, integument and psychiatric systems are negative, except as otherwise noted.      Objective    BP (!) 141/92   Pulse 69   Temp 98.9  F (37.2  C) (Tympanic)   Ht 1.702 m (5' 7\")   Wt 78.5 kg (173 lb)   SpO2 98%   BMI 27.10 kg/m    Body mass index is 27.1 kg/m .  Physical Exam  Vitals reviewed. Exam conducted with a chaperone present.   Eyes:      Conjunctiva/sclera: Conjunctivae normal.   Pulmonary:      Effort: Pulmonary effort is normal.   Abdominal:      Palpations: Abdomen is soft.   Genitourinary:      Skin:     General: Skin is warm.      Capillary Refill: Capillary refill takes less than 2 seconds.   Neurological:      General: No focal deficit present.      Mental Status: He is alert.   Psychiatric:         Mood and Affect: Mood normal.              Signed Electronically by: Bello Luna MD    "

## 2024-05-17 NOTE — LETTER
5/17/2024         RE: Dann Castillo  [id number] [facility] [id number]        Dear Colleague,    Thank you for referring your patient, Dann Castillo, to the [facility]. Please see a copy of my visit note below.      Assessment & Plan   Problem List Items Addressed This Visit    None  Visit Diagnoses       Anal or rectal pain    -  Primary    Relevant Medications    COMPOUNDED NON-CONTROLLED SUBSTANCE (CMPD RX) - PHARMACY TO MIX COMPOUNDED MEDICATION    Hemorrhoids, unspecified hemorrhoid type        Relevant Medications    COMPOUNDED NON-CONTROLLED SUBSTANCE (CMPD RX) - PHARMACY TO MIX COMPOUNDED MEDICATION           55 yo M with anal pain  -Pt barely tolerated the KRISTI.  There was focal tender at the posterior midline.  This might be a healing fissure  -No anoscopy was done as pt was not able to tolerate this.  KRISTI did not reveal firm mass or anything concerning  -I recommend treating the anal fissure with nifedipine ointment with lidocaine twice daily for the next 6 to 8 weeks.  -We also talked about daily fiber intake, not to sit on the toilet for more than 5 minutes at a time, and cannot sit on a donut while he is driving.  -Patient is to notify me if he still having pain in the next 4 to 6 weeks.  I will refer him to colorectal surgery if the cream does not help.  -All of his questions were answered.    Face to Face/patient Contact total time: 30 minutes  Pre Charting time: 5 minutes; Post charting time, communication and other activities: 5 minutes;   Total time:  40 minutes      No follow-ups on file.      Subjective   Ed is a 54 year old, presenting for the following health issues:  Consult and Rectal Problem    Hemorrhoidectomy of LL and RPL hemorrhoids Dr. Vargas in 2021  Started having anal pain after that  Been putting it off ever since.  Noted intermittent pain of the anus.  Mostly after and during a BM.  Noted some blood especially after he wipes.    Has daily soft BMs; takes  "fiber supplement daily  Pt is a ; does sit on a donut and this helps with his pain.   Pt is going to schedule a colonoscopy soon  No famhx of colon cancer.           Review of Systems  Constitutional, neuro, ENT, endocrine, pulmonary, cardiac, gastrointestinal, genitourinary, musculoskeletal, integument and psychiatric systems are negative, except as otherwise noted.      Objective    BP (!) 141/92   Pulse 69   Temp 98.9  F (37.2  C) (Tympanic)   Ht 1.702 m (5' 7\")   Wt 78.5 kg (173 lb)   SpO2 98%   BMI 27.10 kg/m    Body mass index is 27.1 kg/m .  Physical Exam  Vitals reviewed. Exam conducted with a chaperone present.   Eyes:      Conjunctiva/sclera: Conjunctivae normal.   Pulmonary:      Effort: Pulmonary effort is normal.   Abdominal:      Palpations: Abdomen is soft.   Genitourinary:      Skin:     General: Skin is warm.      Capillary Refill: Capillary refill takes less than 2 seconds.   Neurological:      General: No focal deficit present.      Mental Status: He is alert.   Psychiatric:         Mood and Affect: Mood normal.              Signed Electronically by: Bello Luna MD        Again, thank you for allowing me to participate in the care of your patient.        Sincerely,        Bello Luna MD  "

## 2024-06-02 ENCOUNTER — HEALTH MAINTENANCE LETTER (OUTPATIENT)
Age: 54
End: 2024-06-02

## 2024-06-07 ENCOUNTER — OFFICE VISIT (OUTPATIENT)
Dept: FAMILY MEDICINE | Facility: CLINIC | Age: 54
End: 2024-06-07
Payer: COMMERCIAL

## 2024-06-07 VITALS
TEMPERATURE: 98.2 F | RESPIRATION RATE: 22 BRPM | SYSTOLIC BLOOD PRESSURE: 112 MMHG | WEIGHT: 170.4 LBS | BODY MASS INDEX: 26.74 KG/M2 | OXYGEN SATURATION: 97 % | HEIGHT: 67 IN | HEART RATE: 98 BPM | DIASTOLIC BLOOD PRESSURE: 84 MMHG

## 2024-06-07 DIAGNOSIS — Z13.1 SCREENING FOR DIABETES MELLITUS: ICD-10-CM

## 2024-06-07 DIAGNOSIS — Z12.5 SCREENING FOR PROSTATE CANCER: ICD-10-CM

## 2024-06-07 DIAGNOSIS — Z00.00 ROUTINE HISTORY AND PHYSICAL EXAMINATION OF ADULT: Primary | ICD-10-CM

## 2024-06-07 LAB
CHOLEST SERPL-MCNC: 172 MG/DL
FASTING STATUS PATIENT QL REPORTED: YES
FASTING STATUS PATIENT QL REPORTED: YES
GLUCOSE SERPL-MCNC: 103 MG/DL (ref 70–99)
HDLC SERPL-MCNC: 33 MG/DL
LDLC SERPL CALC-MCNC: 105 MG/DL
NONHDLC SERPL-MCNC: 139 MG/DL
PSA SERPL DL<=0.01 NG/ML-MCNC: 0.68 NG/ML (ref 0–3.5)
TRIGL SERPL-MCNC: 170 MG/DL

## 2024-06-07 PROCEDURE — 82947 ASSAY GLUCOSE BLOOD QUANT: CPT | Performed by: FAMILY MEDICINE

## 2024-06-07 PROCEDURE — 80061 LIPID PANEL: CPT | Performed by: FAMILY MEDICINE

## 2024-06-07 PROCEDURE — G0103 PSA SCREENING: HCPCS | Performed by: FAMILY MEDICINE

## 2024-06-07 PROCEDURE — 99396 PREV VISIT EST AGE 40-64: CPT | Performed by: FAMILY MEDICINE

## 2024-06-07 PROCEDURE — 36415 COLL VENOUS BLD VENIPUNCTURE: CPT | Performed by: FAMILY MEDICINE

## 2024-06-07 SDOH — HEALTH STABILITY: PHYSICAL HEALTH: ON AVERAGE, HOW MANY MINUTES DO YOU ENGAGE IN EXERCISE AT THIS LEVEL?: 0 MIN

## 2024-06-07 SDOH — HEALTH STABILITY: PHYSICAL HEALTH: ON AVERAGE, HOW MANY DAYS PER WEEK DO YOU ENGAGE IN MODERATE TO STRENUOUS EXERCISE (LIKE A BRISK WALK)?: 1 DAY

## 2024-06-07 ASSESSMENT — PATIENT HEALTH QUESTIONNAIRE - PHQ9
10. IF YOU CHECKED OFF ANY PROBLEMS, HOW DIFFICULT HAVE THESE PROBLEMS MADE IT FOR YOU TO DO YOUR WORK, TAKE CARE OF THINGS AT HOME, OR GET ALONG WITH OTHER PEOPLE: SOMEWHAT DIFFICULT
SUM OF ALL RESPONSES TO PHQ QUESTIONS 1-9: 16
SUM OF ALL RESPONSES TO PHQ QUESTIONS 1-9: 16

## 2024-06-07 ASSESSMENT — SOCIAL DETERMINANTS OF HEALTH (SDOH): HOW OFTEN DO YOU GET TOGETHER WITH FRIENDS OR RELATIVES?: ONCE A WEEK

## 2024-06-07 ASSESSMENT — PAIN SCALES - GENERAL: PAINLEVEL: NO PAIN (0)

## 2024-06-07 NOTE — COMMUNITY RESOURCES LIST (ENGLISH)
June 7, 2024           YOUR PERSONALIZED LIST OF SERVICES & PROGRAMS           NAVIGATION    Eligibility Screening      [facility] Health insurance application assistance  [place] [address] (Distance: 10.8 miles)  Language: English  Fee: Free      [facility] application assistance  [place] [address] (Distance: 10.8 miles)  Language: English  Fee: Free      [employer] [place] - SNAP (formerly food stamps) Screening and Application help  Phone: (976) 955-8837  Website: https://www.[employer].org/programs/[place]-food-helpline/  Language: English  Hours: Mon 10:00 AM - 5:00 PM Tue 10:00 AM - 5:00 PM Wed 10:00 AM - 5:00 PM Thu 10:00 AM - 5:00 PM Fri 10:00 AM - 5:00 PM  Fee: Free  Accessibility: Ada accessible, Blind accommodation, Deaf or hard of hearing, Translation services        ASSISTANCE    Nutrition Benefits      [facility] application assistance  [place] [address] (Distance: 10.8 miles)  Language: English  Fee: Free      [facility] application assistance  [place] [address] (Distance: 10.8 miles)  Language: English  Fee: Free      [employer] [place] - SNAP (formerly food stamps) Screening and Application help  Phone: (214) 533-4571  Website: https://www.[employer].org/programs/[place]-food-helpline/  Language: English  Hours: Mon 10:00 AM - 5:00 PM Tue 10:00 AM - 5:00 PM Wed 10:00 AM - 5:00 PM Thu 10:00 AM - 5:00 PM Fri 10:00 AM - 5:00 PM  Fee: Free  Accessibility: Ada accessible, Blind accommodation, Deaf or hard of hearing, Translation services    Pantry      [facility] - [facility] [place]  [address] [place] [address] (Distance: 12.2 miles)  Language: English  Fee: Free      [facility]  [address] [place] [address] (Distance: 12.2 miles)  Phone: (291) 348-5739  Website:  https://www.familypathways.org/our-work/  Language: English  Fee: Free  Accessibility: Ada accessible, Deaf or hard of hearing, Translation services      [facility] [employer] [facility] [employer] [employer] [facility]  Phone: (716) 978-2861  Website: www.[employer]  Language: English, Latvian  Hours: Mon 9:00 AM - 3:30 PM Tue 9:00 AM - 6:30 PM Wed 9:00 AM - 3:30 PM Thu 9:00 AM - 12:30 PM Fri 9:00 AM - 12:30 PM Sat 9:00 AM - 12:00 PM  Fee: Free            Bill Payment Assistance      County - Utility payment assistance  [address] [place] [address] (Distance: 10.8 miles)  Language: English  Fee: Free      [employer]  Phone: (356) 875-4122  Website: https://www.nep11-hnundsjfecgese.org/programs.html  Language: English  Hours: Mon 7:00 AM - 7:00 PM Tue 7:00 AM - 7:00 PM Wed 7:00 AM - 7:00 PM Thu 7:00 AM - 7:00 PM Fri 7:00 AM - 7:00 PM  Fee: Free      - Dislocated Worker/Adult [employer] Employment Program  Phone: (332) 620-7058  Email: kizzy@6connect  Website: https://[employer]/services/employment-services/dislocated-worker-program/  Language: English, Welsh  Hours: Mon 8:00 AM - 4:30 PM Tue 8:00 AM - 4:30 PM Wed 8:00 AM - 4:30 PM Thu 8:00 AM - 4:30 PM Fri 8:00 AM - 4:30 PM  Fee: Free  Accessibility: Ada accessible               IMPORTANT NUMBERS & WEBSITES        Emergency Services  911  .   United Way  211 http://211unitedway.org  .   Poison Control  (946) 312-8296 http://[place]poison.org http://wis[place]poison.org  .     Suicide and Crisis Lifeline  988 http://988lifeline.org  .   Childhelp National Child Abuse Hotline  866.979.9924 http://Childhelphotline.org   .   National Sexual Assault Hotline  (604) 791-4582 (HOPE) http://[employer] Orthopaedicsn.org   .     National Runaway Safeline  (709) 644-1209 (RUNAWAY) http://Snippets.org  .   Pregnancy & Postpartum Support  Call/text 338-810-0038  [place]: http://ppsupportmn.org  [place]: http://[employer].com/wi  .   Substance Abuse  National Helpline ([facility])  800-622-HELP (6946) http://Findtreatment.gov   .                DISCLAIMER: These resources have been generated via the [employer] Platform. [employer] does not endorse any service providers mentioned in this resource list. [employer] does not guarantee that the services mentioned in this resource list will be available to you or will improve your health or wellness.    [facility]

## 2024-06-07 NOTE — PROGRESS NOTES
Preventive Care Visit  [facility]  Esteban Barton MD, Family Medicine  Jun 7, 2024      Assessment & Plan     Routine history and physical examination of adult  Medically doing well, recommended to use athlete's foot cream for tinea pedis.  Stressed on regular exercise, healthy diet and weight loss.  Recommended to check with insurance company about shingles vaccine coverage  - Lipid panel reflex to direct LDL Fasting; Future  - Lipid panel reflex to direct LDL Fasting    Screening for prostate cancer  - PSA, screen; Future  - PSA, screen    Screening for diabetes mellitus  - Glucose; Future  - Glucose      Counseling  Appropriate preventive services were discussed with this patient, including applicable screening as appropriate for fall prevention, nutrition, physical activity, Tobacco-use cessation, weight loss and cognition.  Checklist reviewing preventive services available has been given to the patient.  Reviewed patient's diet, addressing concerns and/or questions.   He is at risk for lack of exercise and has been provided with information to increase physical activity for the benefit of his well-being.   The patient's PHQ-9 score is consistent with moderate depression. He was provided with information regarding depression.       Subjective   Ed is a 54 year old, presenting for the following:  Physical        6/7/2024     8:09 AM   Additional Questions   Roomed by Lavern LOPEZ CMA        HPI  -Patient wants to have lab work done today, he is fasting.         6/7/2024   General Health   How would you rate your overall physical health? (!) FAIR   Feel stress (tense, anxious, or unable to sleep) Rather much   (!) STRESS CONCERN      6/7/2024   Nutrition   Three or more servings of calcium each day? Yes   Diet: I don't know   How many servings of fruit and vegetables per day? (!) 2-3   How many sweetened beverages each day? (!) 2         6/7/2024   Exercise   Days per week of  moderate/strenous exercise 1 day   Average minutes spent exercising at this level 0 min   (!) EXERCISE CONCERN      2024   Social Factors   Frequency of gathering with friends or relatives Once a week   Worry food won't last until get money to buy more Yes   Food not last or not have enough money for food? No   Do you have housing?  Yes   Are you worried about losing your housing? No   Lack of transportation? No   Unable to get utilities (heat,electricity)? Yes   Want help with housing or utility concern? No   (!) FOOD SECURITY CONCERN PRESENT(!) FINANCIAL RESOURCE STRAIN CONCERN      2024   Fall Risk   Fallen 2 or more times in the past year? No   Trouble with walking or balance? No          2024   Dental   Dentist two times every year? Yes         2024   TB Screening   Were you born outside of the [place]? Yes       Today's PHQ-9 Score:       2024     7:52 AM   PHQ-9 SCORE   PHQ-9 Total Score MyChart 16 (Moderately severe depression)   PHQ-9 Total Score 16         2024   Substance Use   Alcohol more than 3/day or more than 7/wk No   Do you use any other substances recreationally? No     Social History     Tobacco Use    Smoking status: Former     Current packs/day: 0.00     Average packs/day: 1.5 packs/day for 17.0 years (25.5 ttl pk-yrs)     Types: Cigarettes     Start date: 1988     Quit date: 2005     Years since quittin.9     Passive exposure: Past    Smokeless tobacco: Never    Tobacco comments:     around second hand smoke daily   Vaping Use    Vaping status: Never Used   Substance Use Topics    Alcohol use: Yes     Comment: occ    Drug use: Not Currently           2024   STI Screening   New sexual partner(s) since last STI/HIV test? No   ASCVD Risk   The 10-year ASCVD risk score (Alejandrina OBANDO, et al., 2019) is: 3.2%    Values used to calculate the score:      Age: 54 years      Sex: Male      Is Non- : No      Diabetic: No      Tobacco  smoker: No      Systolic Blood Pressure: 112 mmHg      Is BP treated: No      HDL Cholesterol: 52 mg/dL      Total Cholesterol: 198 mg/dL         Reviewed and updated as needed this visit by Provider                    History reviewed. No pertinent past medical history.  Past Surgical History:   Procedure Laterality Date    AMPUTATE FINGER(S) Left 2/2/2018    Procedure: AMPUTATE FINGER(S);  Left long finger revision amputation.;  Surgeon: Denise Lyle MD;  Location: [facility]    AMPUTATE FINGER(S)      ANTERIOR CRUCIATE LIGAMENT REPAIR      ARTHROTOMY SHOULDER, ROTATOR CUFF REPAIR, COMBINED Right 5/2/2016    Procedure: COMBINED ARTHROTOMY SHOULDER, ROTATOR CUFF REPAIR;  Surgeon: Rafael Pacheco MD;  Location: [facility]    ARTHROTOMY SHOULDER, SUBACROMIAL DECOMPRESSION, COMBINED Left 6/13/2016    Procedure: COMBINED ARTHROTOMY SHOULDER, SUBACROMIAL DECOMPRESSION;  Surgeon: Rafael Pacheco MD;  Location: [facility]    DESTRUCTION OF PARAVERTEBRAL FACETCERVICAL / THORACIC SINGLE Right 8/25/2017    Procedure: DESTRUCTION OF PARAVERTEBRAL FACET CERVICAL / THORACIC SINGLE;  Right Radiofrequency Ablation of the Cervical 3rd occipital nerve, C3. 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     HC FULL THICK GRAFT HEAD/AXIL/GEN/HND/FT <=20 CM  8/31/2003    Full thickness skin graft to the donor site of the cross finger flap     HEMORRHOIDECTOMY EXTERNAL N/A 4/13/2021    Procedure: EXAM UNDER ANESTHESIA WITH HEMORRHOIDECTOMY X2;  Surgeon: Gwen Vargas MD;  Location: [facility];  Service: General    TX ANAL SPHINCTEROTOMY N/A 3/9/2021    Procedure: EXAM UNDER ANESTHESIA LATERAL INTERNAL SPHINCTEROTOMY;  Surgeon: Gwen Vargas MD;  Location: [facility];  Service: General    SHOULDER SURGERY Bilateral     SURGICAL HISTORY OF -   9/22/2003     Division of left cross finger flap    SURGICAL HISTORY OF -   2003    left ACL repair and partial menisectomy     OB History   No obstetric history on file.     Lab work is in process  Labs reviewed in EPIC  BP Readings from Last 3 Encounters:   06/07/24 112/84   05/17/24 (!) 141/92   05/02/24 130/84    Wt Readings from Last 3 Encounters:   06/07/24 77.3 kg (170 lb 6.4 oz)   05/17/24 78.5 kg (173 lb)   05/02/24 78.5 kg (173 lb)                  Patient Active Problem List   Diagnosis    Mild major depression (H)    Shoulder joint pain, unspecified laterality    Postconcussion syndrome    Refusal of blood transfusions as patient is Orthodoxy     Past Surgical History:   Procedure Laterality Date    AMPUTATE FINGER(S) Left 2/2/2018    Procedure: AMPUTATE FINGER(S);  Left long finger revision amputation.;  Surgeon: Denise Lyle MD;  Location: [facility]    AMPUTATE FINGER(S)      ANTERIOR CRUCIATE LIGAMENT REPAIR      ARTHROTOMY SHOULDER, ROTATOR CUFF REPAIR, COMBINED Right 5/2/2016    Procedure: COMBINED ARTHROTOMY SHOULDER, ROTATOR CUFF REPAIR;  Surgeon: Rafael Pacheco MD;  Location: [facility]    ARTHROTOMY SHOULDER, SUBACROMIAL DECOMPRESSION, COMBINED Left 6/13/2016    Procedure: COMBINED ARTHROTOMY SHOULDER, SUBACROMIAL DECOMPRESSION;  Surgeon: Rafael Pacheco MD;  Location: [facility]    DESTRUCTION OF PARAVERTEBRAL FACETCERVICAL / THORACIC SINGLE Right 8/25/2017    Procedure: DESTRUCTION OF PARAVERTEBRAL FACET CERVICAL / THORACIC SINGLE;  Right Radiofrequency Ablation of the Cervical 3rd occipital nerve, C3. 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     HC FULL THICK GRAFT HEAD/AXIL/GEN/HND/FT <=20 CM  8/31/2003    Full thickness skin graft to the donor site of the cross finger flap     HEMORRHOIDECTOMY EXTERNAL N/A 4/13/2021     Procedure: EXAM UNDER ANESTHESIA WITH HEMORRHOIDECTOMY X2;  Surgeon: Gwen Vargas MD;  Location: [facility];  Service: General    MA ANAL SPHINCTEROTOMY N/A 3/9/2021    Procedure: EXAM UNDER ANESTHESIA LATERAL INTERNAL SPHINCTEROTOMY;  Surgeon: Gwen Vargas MD;  Location: [facility];  Service: General    SHOULDER SURGERY Bilateral     SURGICAL HISTORY OF -   2003    Division of left cross finger flap    SURGICAL HISTORY OF -       left ACL repair and partial menisectomy       Social History     Tobacco Use    Smoking status: Former     Current packs/day: 0.00     Average packs/day: 1.5 packs/day for 17.0 years (25.5 ttl pk-yrs)     Types: Cigarettes     Start date: 1988     Quit date: 2005     Years since quittin.9     Passive exposure: Past    Smokeless tobacco: Never    Tobacco comments:     around second hand smoke daily   Substance Use Topics    Alcohol use: Yes     Comment: occ     Family History   Problem Relation Age of Onset    Heart Disease Mother     Diabetes Father     Cerebrovascular Disease Father         during surgery    Colon Cancer Father     Depression Sister     Obesity Sister     Hypertension Sister     Obesity Sister     Diabetes Sister     Hypertension Sister     Depression Sister     Anxiety Disorder Sister          Current Outpatient Medications   Medication Sig Dispense Refill    albuterol (PROAIR HFA/PROVENTIL HFA/VENTOLIN HFA) 108 (90 Base) MCG/ACT inhaler Inhale 2 puffs into the lungs every 4 hours as needed for shortness of breath or wheezing 18 g 1    clindamycin (CLEOCIN T) 1 % external solution Apply twice daily to scalp. 60 mL 3    COMPOUNDED NON-CONTROLLED SUBSTANCE (CMPD RX) - PHARMACY TO MIX COMPOUNDED MEDICATION Nifedipine 0.3% ointment.  Apply to perianal and inside the anal canal twice a day for 4-6 weeks 60 g 1    fluocinonide (LIDEX) 0.05 % external cream Apply sparingly twice daily as needed to rash to arms 30 g 5    fluticasone  "(FLONASE) 50 MCG/ACT nasal spray Spray 1-2 sprays into both nostrils daily 16 mL 11    levocetirizine (XYZAL) 5 MG tablet Take 1 tablet (5 mg) by mouth every evening 30 tablet 11    EPINEPHrine (ANY BX GENERIC EQUIV) 0.3 MG/0.3ML injection 2-pack Inject 0.3 mLs (0.3 mg) into the muscle once as needed for anaphylaxis (Patient not taking: Reported on 5/17/2024) 0.6 mL 0     Allergies   Allergen Reactions    Peanuts [Nuts] Hives     And the oil     Recent Labs   Lab Test 10/14/22  1058 02/26/21  0828 02/04/20  1049 04/13/19 2044   A1C 5.3  --   --   --    LDL  --  119*  --   --    HDL  --  52  --   --    TRIG  --  134  --   --    ALT  --   --  30  --    CR  --   --  0.78 0.93   GFRESTIMATED  --   --  >90 >90   GFRESTBLACK  --   --  >90 >90   POTASSIUM  --   --  3.5 3.8          Review of Systems  Constitutional, neuro, ENT, endocrine, pulmonary, cardiac, gastrointestinal, genitourinary, musculoskeletal, integument and psychiatric systems are negative, except as otherwise noted.     Objective    Exam  /84 (BP Location: Right arm, Patient Position: Sitting, Cuff Size: Adult Regular)   Pulse 98   Temp 98.2  F (36.8  C) (Tympanic)   Resp 22   Ht 1.702 m (5' 7\")   Wt 77.3 kg (170 lb 6.4 oz)   SpO2 97%   BMI 26.69 kg/m     Estimated body mass index is 26.69 kg/m  as calculated from the following:    Height as of this encounter: 1.702 m (5' 7\").    Weight as of this encounter: 77.3 kg (170 lb 6.4 oz).    Physical Exam  GENERAL: alert and no distress  EYES: Eyes grossly normal to inspection, PERRL and conjunctivae and sclerae normal  HENT: normal cephalic/atraumatic, nose and mouth without ulcers or lesions, oropharynx clear, and oral mucous membranes moist  NECK: no adenopathy, no asymmetry, masses, or scars  RESP: lungs clear to auscultation - no rales, rhonchi or wheezes  CV: regular rate and rhythm, normal S1 S2, no S3 or S4, no murmur, click or rub, no peripheral edema  ABDOMEN: soft, nontender, no " hepatosplenomegaly, no masses and bowel sounds normal  MS: no gross musculoskeletal defects noted, no edema  SKIN: Whitish skin maceration involving interdigital spaces of right foot  NEURO: Normal strength and tone, mentation intact and speech normal  PSYCH: mentation appears normal, affect normal/bright    Signed Electronically by: Esteban Barton MD

## 2024-08-08 ENCOUNTER — ANESTHESIA EVENT (OUTPATIENT)
Dept: GASTROENTEROLOGY | Facility: CLINIC | Age: 54
End: 2024-08-08
Payer: COMMERCIAL

## 2024-08-08 ASSESSMENT — LIFESTYLE VARIABLES: TOBACCO_USE: 1

## 2024-08-08 NOTE — ANESTHESIA PREPROCEDURE EVALUATION
Anesthesia Pre-Procedure Evaluation    Patient: Dann Castillo   MRN: [id number] : 1970        Procedure : Procedure(s):  Colonoscopy          No past medical history on file.   Past Surgical History:   Procedure Laterality Date     AMPUTATE FINGER(S) Left 2018    Procedure: AMPUTATE FINGER(S);  Left long finger revision amputation.;  Surgeon: Denise Lyle MD;  Location: [facility]     AMPUTATE FINGER(S)       ANTERIOR CRUCIATE LIGAMENT REPAIR       ARTHROTOMY SHOULDER, ROTATOR CUFF REPAIR, COMBINED Right 2016    Procedure: COMBINED ARTHROTOMY SHOULDER, ROTATOR CUFF REPAIR;  Surgeon: Rafael Pacheco MD;  Location: [facility]     ARTHROTOMY SHOULDER, SUBACROMIAL DECOMPRESSION, COMBINED Left 2016    Procedure: COMBINED ARTHROTOMY SHOULDER, SUBACROMIAL DECOMPRESSION;  Surgeon: Rafael Pacheco MD;  Location: [facility]     DESTRUCTION OF PARAVERTEBRAL FACETCERVICAL / THORACIC SINGLE Right 2017    Procedure: DESTRUCTION OF PARAVERTEBRAL FACET CERVICAL / THORACIC SINGLE;  Right Radiofrequency Ablation of the Cervical 3rd occipital nerve, C3. 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      HC FULL THICK GRAFT HEAD/AXIL/GEN/HND/FT <=20 CM  2003    Full thickness skin graft to the donor site of the cross finger flap      HEMORRHOIDECTOMY EXTERNAL N/A 2021    Procedure: EXAM UNDER ANESTHESIA WITH HEMORRHOIDECTOMY X2;  Surgeon: Gwen Vargas MD;  Location: [facility];  Service: General     [place] ANAL SPHINCTEROTOMY N/A 3/9/2021    Procedure: EXAM UNDER ANESTHESIA LATERAL INTERNAL SPHINCTEROTOMY;  Surgeon: Gwen Vargas MD;  Location: [facility];  Service: General     SHOULDER SURGERY Bilateral      SURGICAL HISTORY OF -   2003    Division of left cross finger flap     SURGICAL HISTORY OF -       left  ACL repair and partial menisectomy      Allergies   Allergen Reactions     Peanuts [Nuts] Hives     And the oil      Social History     Tobacco Use     Smoking status: Former     Current packs/day: 0.00     Average packs/day: 1.5 packs/day for 17.0 years (25.5 ttl pk-yrs)     Types: Cigarettes     Start date: 1988     Quit date: 2005     Years since quittin.1     Passive exposure: Past     Smokeless tobacco: Never     Tobacco comments:     around second hand smoke daily   Substance Use Topics     Alcohol use: Yes     Comment: occ      Wt Readings from Last 1 Encounters:   24 77.3 kg (170 lb 6.4 oz)        Anesthesia Evaluation   Pt has had prior anesthetic. Type: General, MAC and Regional.    No history of anesthetic complications       ROS/MED HX  ENT/Pulmonary:     (+)           allergic rhinitis,     tobacco use, Past use,  26  Pack-Year Hx,                      Neurologic:  - neg neurologic ROS     Cardiovascular:  - neg cardiovascular ROS   (+)  - -   -  - -                                 Previous cardiac testing   Echo: Date: Results:    Stress Test:  Date: Results:    ECG Reviewed:  Date: 20 Results:  Sinus  Tachycardia   WITHIN NORMAL LIMITS    Cath:  Date: Results:      METS/Exercise Tolerance:     Hematologic:  - neg hematologic  ROS     Musculoskeletal:       GI/Hepatic:  - neg GI/hepatic ROS     Renal/Genitourinary:  - neg Renal ROS     Endo: Comment: obesity      Psychiatric/Substance Use:     (+) psychiatric history depression       Infectious Disease:  - neg infectious disease ROS     Malignancy:  - neg malignancy ROS     Other: Comment: Refusal of blood transfusions as patient is Taoism           Physical Exam    Airway  airway exam normal      Mallampati: II       Respiratory Devices and Support         Dental       (+) Minor Abnormalities - some fillings, tiny chips      Cardiovascular   cardiovascular exam normal          Pulmonary   pulmonary exam normal               OUTSIDE LABS:  CBC:   Lab Results   Component Value Date    WBC 9.4 02/26/2021    WBC 15.2 (H) 02/04/2020    HGB 16.1 02/26/2021    HGB 16.2 02/04/2020    HCT 46.2 02/26/2021    HCT 47.3 02/04/2020     02/26/2021     02/04/2020     BMP:   Lab Results   Component Value Date     02/04/2020     04/13/2019    POTASSIUM 3.5 02/04/2020    POTASSIUM 3.8 04/13/2019    CHLORIDE 108 02/04/2020    CHLORIDE 109 04/13/2019    CO2 24 02/04/2020    CO2 28 04/13/2019    BUN 12 02/04/2020    BUN 14 04/13/2019    CR 0.78 02/04/2020    CR 0.93 04/13/2019     (H) 06/07/2024    GLC 87 02/26/2021     COAGS:   Lab Results   Component Value Date    INR 1.02 02/04/2020     POC:   Lab Results   Component Value Date    BGM 97 05/01/2009     HEPATIC:   Lab Results   Component Value Date    ALBUMIN 4.0 02/04/2020    PROTTOTAL 7.6 02/04/2020    ALT 30 02/04/2020    AST 24 02/04/2020    ALKPHOS 71 02/04/2020    BILITOTAL 0.5 02/04/2020     OTHER:   Lab Results   Component Value Date    LACT 2.5 (H) 02/04/2020    A1C 5.3 10/14/2022    BRITANY 8.9 02/04/2020    LIPASE 195 02/04/2020    CRP 7.2 02/04/2020       Anesthesia Plan    ASA Status:  2    NPO Status:  NPO Appropriate    Anesthesia Type: General.     - Airway: Native airway      Maintenance: Balanced.        Consents    Anesthesia Plan(s) and associated risks, benefits, and realistic alternatives discussed. Questions answered and patient/representative(s) expressed understanding.     - Discussed: Risks, Benefits and Alternatives for BOTH SEDATION and the PROCEDURE were discussed     - Discussed with:  Patient, Spouse            Postoperative Care            Comments:               OLGA Haq CRNA    I have reviewed the pertinent notes and labs in the chart from the past 30 days and (re)examined the patient.  Any updates or changes from those notes are reflected in this note.

## 2024-08-09 ENCOUNTER — ANESTHESIA (OUTPATIENT)
Dept: GASTROENTEROLOGY | Facility: CLINIC | Age: 54
End: 2024-08-09
Payer: COMMERCIAL

## 2024-08-09 ENCOUNTER — HOSPITAL ENCOUNTER (OUTPATIENT)
Facility: CLINIC | Age: 54
Discharge: HOME | End: 2024-08-09
Attending: STUDENT IN AN ORGANIZED HEALTH CARE EDUCATION/TRAINING PROGRAM | Admitting: STUDENT IN AN ORGANIZED HEALTH CARE EDUCATION/TRAINING PROGRAM
Payer: COMMERCIAL

## 2024-08-09 VITALS
BODY MASS INDEX: 26.68 KG/M2 | HEART RATE: 79 BPM | OXYGEN SATURATION: 95 % | DIASTOLIC BLOOD PRESSURE: 86 MMHG | RESPIRATION RATE: 16 BRPM | SYSTOLIC BLOOD PRESSURE: 123 MMHG | TEMPERATURE: 98.1 F | HEIGHT: 67 IN | WEIGHT: 170 LBS

## 2024-08-09 LAB — COLONOSCOPY: NORMAL

## 2024-08-09 PROCEDURE — 250N000011 HC RX IP 250 OP 636: Performed by: NURSE ANESTHETIST, CERTIFIED REGISTERED

## 2024-08-09 PROCEDURE — 88305 TISSUE EXAM BY PATHOLOGIST: CPT | Mod: TC | Performed by: STUDENT IN AN ORGANIZED HEALTH CARE EDUCATION/TRAINING PROGRAM

## 2024-08-09 PROCEDURE — 370N000017 HC ANESTHESIA TECHNICAL FEE, PER MIN: Performed by: STUDENT IN AN ORGANIZED HEALTH CARE EDUCATION/TRAINING PROGRAM

## 2024-08-09 PROCEDURE — 45385 COLONOSCOPY W/LESION REMOVAL: CPT | Performed by: STUDENT IN AN ORGANIZED HEALTH CARE EDUCATION/TRAINING PROGRAM

## 2024-08-09 PROCEDURE — 250N000009 HC RX 250: Performed by: NURSE ANESTHETIST, CERTIFIED REGISTERED

## 2024-08-09 PROCEDURE — 88305 TISSUE EXAM BY PATHOLOGIST: CPT | Mod: 26 | Performed by: PATHOLOGY

## 2024-08-09 PROCEDURE — 250N000009 HC RX 250: Performed by: STUDENT IN AN ORGANIZED HEALTH CARE EDUCATION/TRAINING PROGRAM

## 2024-08-09 PROCEDURE — 258N000003 HC RX IP 258 OP 636: Performed by: STUDENT IN AN ORGANIZED HEALTH CARE EDUCATION/TRAINING PROGRAM

## 2024-08-09 RX ORDER — NALOXONE HYDROCHLORIDE 0.4 MG/ML
0.1 INJECTION, SOLUTION INTRAMUSCULAR; INTRAVENOUS; SUBCUTANEOUS
Status: DISCONTINUED | OUTPATIENT
Start: 2024-08-09 | End: 2024-08-09 | Stop reason: HOSPADM

## 2024-08-09 RX ORDER — NALOXONE HYDROCHLORIDE 0.4 MG/ML
0.2 INJECTION, SOLUTION INTRAMUSCULAR; INTRAVENOUS; SUBCUTANEOUS
Status: DISCONTINUED | OUTPATIENT
Start: 2024-08-09 | End: 2024-08-09 | Stop reason: HOSPADM

## 2024-08-09 RX ORDER — SODIUM CHLORIDE, SODIUM LACTATE, POTASSIUM CHLORIDE, CALCIUM CHLORIDE 600; 310; 30; 20 MG/100ML; MG/100ML; MG/100ML; MG/100ML
INJECTION, SOLUTION INTRAVENOUS CONTINUOUS
Status: DISCONTINUED | OUTPATIENT
Start: 2024-08-09 | End: 2024-08-09 | Stop reason: HOSPADM

## 2024-08-09 RX ORDER — GLYCOPYRROLATE 0.2 MG/ML
INJECTION, SOLUTION INTRAMUSCULAR; INTRAVENOUS PRN
Status: DISCONTINUED | OUTPATIENT
Start: 2024-08-09 | End: 2024-08-09

## 2024-08-09 RX ORDER — FLUMAZENIL 0.1 MG/ML
0.2 INJECTION, SOLUTION INTRAVENOUS
Status: DISCONTINUED | OUTPATIENT
Start: 2024-08-09 | End: 2024-08-09 | Stop reason: HOSPADM

## 2024-08-09 RX ORDER — ONDANSETRON 2 MG/ML
INJECTION INTRAMUSCULAR; INTRAVENOUS PRN
Status: DISCONTINUED | OUTPATIENT
Start: 2024-08-09 | End: 2024-08-09

## 2024-08-09 RX ORDER — NALOXONE HYDROCHLORIDE 0.4 MG/ML
0.4 INJECTION, SOLUTION INTRAMUSCULAR; INTRAVENOUS; SUBCUTANEOUS
Status: DISCONTINUED | OUTPATIENT
Start: 2024-08-09 | End: 2024-08-09 | Stop reason: HOSPADM

## 2024-08-09 RX ORDER — DEXAMETHASONE SODIUM PHOSPHATE 4 MG/ML
4 INJECTION, SOLUTION INTRA-ARTICULAR; INTRALESIONAL; INTRAMUSCULAR; INTRAVENOUS; SOFT TISSUE
Status: DISCONTINUED | OUTPATIENT
Start: 2024-08-09 | End: 2024-08-09 | Stop reason: HOSPADM

## 2024-08-09 RX ORDER — PROPOFOL 10 MG/ML
INJECTION, EMULSION INTRAVENOUS CONTINUOUS PRN
Status: DISCONTINUED | OUTPATIENT
Start: 2024-08-09 | End: 2024-08-09

## 2024-08-09 RX ORDER — LIDOCAINE 40 MG/G
CREAM TOPICAL
Status: DISCONTINUED | OUTPATIENT
Start: 2024-08-09 | End: 2024-08-09 | Stop reason: HOSPADM

## 2024-08-09 RX ORDER — LIDOCAINE HYDROCHLORIDE 20 MG/ML
INJECTION, SOLUTION INFILTRATION; PERINEURAL PRN
Status: DISCONTINUED | OUTPATIENT
Start: 2024-08-09 | End: 2024-08-09

## 2024-08-09 RX ADMIN — PROPOFOL 40 MG: 10 INJECTION, EMULSION INTRAVENOUS at 09:03

## 2024-08-09 RX ADMIN — SODIUM CHLORIDE, POTASSIUM CHLORIDE, SODIUM LACTATE AND CALCIUM CHLORIDE 1000 ML: 600; 310; 30; 20 INJECTION, SOLUTION INTRAVENOUS at 08:09

## 2024-08-09 RX ADMIN — ONDANSETRON 4 MG: 2 INJECTION INTRAMUSCULAR; INTRAVENOUS at 09:06

## 2024-08-09 RX ADMIN — PROPOFOL 50 MG: 10 INJECTION, EMULSION INTRAVENOUS at 08:48

## 2024-08-09 RX ADMIN — GLYCOPYRROLATE 0.2 MG: 0.2 INJECTION, SOLUTION INTRAMUSCULAR; INTRAVENOUS at 08:47

## 2024-08-09 RX ADMIN — PROPOFOL 200 MCG/KG/MIN: 10 INJECTION, EMULSION INTRAVENOUS at 08:47

## 2024-08-09 RX ADMIN — LIDOCAINE HYDROCHLORIDE 2 ML: 20 INJECTION, SOLUTION INFILTRATION; PERINEURAL at 08:48

## 2024-08-09 RX ADMIN — LIDOCAINE HYDROCHLORIDE 0.1 ML: 10 INJECTION, SOLUTION EPIDURAL; INFILTRATION; INTRACAUDAL; PERINEURAL at 08:10

## 2024-08-09 ASSESSMENT — ACTIVITIES OF DAILY LIVING (ADL)
ADLS_ACUITY_SCORE: 35
ADLS_ACUITY_SCORE: 35

## 2024-08-09 NOTE — ANESTHESIA CARE TRANSFER NOTE
Patient: Ed Jonathan    Procedure: Procedure(s):  COLONOSCOPY, FLEXIBLE, WITH LESION REMOVAL USING SNARE       Diagnosis: Screen for colon cancer [Z12.11]  Diagnosis Additional Information: No value filed.    Anesthesia Type:   General     Note:    Oropharynx: oropharynx clear of all foreign objects  Level of Consciousness: awake  Oxygen Supplementation: room air    Independent Airway: airway patency satisfactory and stable  Dentition: dentition unchanged  Vital Signs Stable: post-procedure vital signs reviewed and stable  Report to RN Given: handoff report given  Patient transferred to: Phase II    Handoff Report: Identifed the Patient, Identified the Reponsible Provider, Reviewed the pertinent medical history, Discussed the surgical course, Reviewed Intra-OP anesthesia mangement and issues during anesthesia, Set expectations for post-procedure period and Allowed opportunity for questions and acknowledgement of understanding  Vitals:  Vitals Value Taken Time   BP     Temp     Pulse     Resp     SpO2         Electronically Signed By: OLGA Anaya CRNA  August 9, 2024  9:17 AM

## 2024-08-09 NOTE — LETTER
Dann Castillo  [address] [facility] [address]      August 15, 2024    Dear Dann,  This letter is written to inform you of the results of your recent colonoscopy.  Your examination showed polyp(s) in your ascending colon. All polyps were removed in their entirety and sent for review by a pathologist. As you will see on the pathology report below, the tissue(s) were tubular adenomatous polyps. Your examination was otherwise without abnormality.    Adenomatous polyps are entirely benign (non-cancerous); however, patients who have developed these polyps are at an increased risk for developing additional polyps in the future. If these are not eventually removed, there is a risk of developing colon cancer. We will advise more frequent examinations with you because of the risk associated with this type of polyp.    Given these findings, I recommend that you undergo a repeat colonoscopy in 7 year(s) for surveillance. We will enter you into a recall system so you receive a reminder closer to the time that you are due for repeat examination. Your physician also recommends that you adhere to a high fiber diet indefinitely to promote colon health.     Please remember that this recommendation is made with the understanding that you are not experiencing persistent changes in bowel function, bleeding per rectum, and/or significant abdominal pain. If you experience these symptoms, please contact your primary care provider for a further evaluation.     If you have any questions or concerns about the results of your colonoscopy or the appropriate follow-up, please contact my assistant at [phone]    Sincerely,      Trell Venegas MD   [facility]  ___                    Resulted Orders   Surgical Pathology Exam   Result Value Ref Range    Case Report       Surgical Pathology Report                         Case: [id number]                                  Authorizing Provider:  Trell Venegas MD        Collected:           08/09/2024 09:08 AM          Ordering Location:     [facility]   Received:            08/09/2024 09:49 AM                                 [place]                                                                      Pathologist:           Alice Womack MD                                                          Specimen:    Large Intestine, Colon, Ascending                                                          Final Diagnosis       A. Colon, Ascending, polyp, polypectomy:  -Tubular adenoma  -Negative for high-grade dysplasia and malignancy.        Clinical Information       Procedure:  COLONOSCOPY, FLEXIBLE, WITH LESION REMOVAL USING SNARE  Pre-op Diagnosis: Screen for colon cancer [Z12.11]  Post-op Diagnosis: Z12.11 - Screen for colon cancer [ICD-10-CM]      Gross Description       A(1). Large Intestine, Colon, Ascending, :  The specimen is received in formalin, labeled with the patient's name, medical record number and other identifying information and designated  ascending colon polyp . It consists of a single tan soft tissue fragment measuring 0.3 cm. Entirely submitted in one cassette.   CARLOS Larsen(ASCP)CM 8/9/2024 2:17 PM       Microscopic Description       Microscopic examination was performed.        Performing Labs       The technical component of this testing was completed at [facility].    Stain controls for all stains resulted within this report have been reviewed and show appropriate reactivity.       Case Images

## 2024-08-09 NOTE — H&P
[facility]    Pre-Endoscopy History and Physical     Ed Jonathan MRN# [id number]   YOB: 1970 Age: 54 year old     Date of Procedure: 8/9/2024  Primary care provider: No Ref-Primary, Physician  Type of Endoscopy: Colonoscopy with possible biopsy, possible polypectomy  Reason for Procedure: screening  Type of Anesthesia Anticipated: Conscious Sedation    HPI:    Ed is a 54 year old male who will be undergoing the above procedure.      A history and physical has been performed. The patient's medications and allergies have been reviewed. The risks and benefits of the procedure and the sedation options and risks were discussed with the patient.  All questions were answered and informed consent was obtained.      He denies a personal or family history of anesthesia complications or bleeding disorders.     Colonoscopy, first one, screening. ASA II. No AC. Hx of hemorrhoidectomy. Father with colon cancer.     Patient Active Problem List   Diagnosis    Mild major depression (H)    Shoulder joint pain, unspecified laterality    Postconcussion syndrome    Refusal of blood transfusions as patient is Shinto        History reviewed. No pertinent past medical history.     Past Surgical History:   Procedure Laterality Date    AMPUTATE FINGER(S) Left 2/2/2018    Procedure: AMPUTATE FINGER(S);  Left long finger revision amputation.;  Surgeon: Denise Lyle MD;  Location: [facility]    AMPUTATE FINGER(S)      ANTERIOR CRUCIATE LIGAMENT REPAIR      ARTHROTOMY SHOULDER, ROTATOR CUFF REPAIR, COMBINED Right 5/2/2016    Procedure: COMBINED ARTHROTOMY SHOULDER, ROTATOR CUFF REPAIR;  Surgeon: Rafael Pacheco MD;  Location: [facility]    ARTHROTOMY SHOULDER, SUBACROMIAL DECOMPRESSION, COMBINED Left 6/13/2016    Procedure: COMBINED ARTHROTOMY SHOULDER, SUBACROMIAL DECOMPRESSION;  Surgeon: Rafael Pacheco MD;  Location: [facility]    DESTRUCTION OF PARAVERTEBRAL FACETCERVICAL / THORACIC  SINGLE Right 2017    Procedure: DESTRUCTION OF PARAVERTEBRAL FACET CERVICAL / THORACIC SINGLE;  Right Radiofrequency Ablation of the Cervical 3rd occipital nerve, C3. C4  ;  Surgeon: Art Car MD;  [facility]    HC FORMATION DIRECT/TUBED PEDICLE, FH/CHK/CHN/MTH/NCK/LYUDMILA/GEN/HND/FT  2003    Reconstruction left 5th fingertip amputation with a cross finger flap.     HC FULL THICK GRAFT HEAD/AXIL/GEN/HND/FT <=20 CM  2003    Full thickness skin graft to the donor site of the cross finger flap     HEMORRHOIDECTOMY EXTERNAL N/A 2021    Procedure: EXAM UNDER ANESTHESIA WITH HEMORRHOIDECTOMY X2;  Surgeon: Gwen Vargas MD;  Location: [facility];  Service: General    [place] ANAL SPHINCTEROTOMY N/A 3/9/2021    Procedure: EXAM UNDER ANESTHESIA LATERAL INTERNAL SPHINCTEROTOMY;  Surgeon: Gwen Vargas MD;  Location: [facility];  Service: General    SHOULDER SURGERY Bilateral     SURGICAL HISTORY OF -   2003    Division of left cross finger flap    SURGICAL HISTORY OF -       left ACL repair and partial menisectomy       Social History     Tobacco Use    Smoking status: Former     Current packs/day: 0.00     Average packs/day: 1.5 packs/day for 17.0 years (25.5 ttl pk-yrs)     Types: Cigarettes     Start date: 1988     Quit date: 2005     Years since quittin.1     Passive exposure: Past    Smokeless tobacco: Never    Tobacco comments:     around second hand smoke daily   Substance Use Topics    Alcohol use: Yes     Comment: occ       Family History   Problem Relation Age of Onset    Heart Disease Mother     Diabetes Father     Cerebrovascular Disease Father         during surgery    Colon Cancer Father     Depression Sister     Obesity Sister     Hypertension Sister     Obesity Sister     Diabetes Sister     Hypertension Sister     Depression Sister     Anxiety Disorder Sister        Prior to Admission medications    Medication Sig Start Date End Date Taking?  "Authorizing Provider   EPINEPHrine (ANY BX GENERIC EQUIV) 0.3 MG/0.3ML injection 2-pack Inject 0.3 mLs (0.3 mg) into the muscle once as needed for anaphylaxis 11/12/20  Yes Abigail Gregory PA-C   fluticasone (FLONASE) 50 MCG/ACT nasal spray Spray 1-2 sprays into both nostrils daily 2/26/21  Yes Abigail Gregory PA-C   levocetirizine (XYZAL) 5 MG tablet Take 1 tablet (5 mg) by mouth every evening 2/26/21  Yes Abigail Gregory PA-C   albuterol (PROAIR HFA/PROVENTIL HFA/VENTOLIN HFA) 108 (90 Base) MCG/ACT inhaler Inhale 2 puffs into the lungs every 4 hours as needed for shortness of breath or wheezing 5/2/24   Esteban Barton MD   clindamycin (CLEOCIN T) 1 % external solution Apply twice daily to scalp. 3/3/23   Kesha Sanchez PA-C   COMPOUNDED NON-CONTROLLED SUBSTANCE (CMPD RX) - PHARMACY TO MIX COMPOUNDED MEDICATION Nifedipine 0.3% ointment.  Apply to perianal and inside the anal canal twice a day for 4-6 weeks 5/17/24   Bello Luna MD   fluocinonide (LIDEX) 0.05 % external cream Apply sparingly twice daily as needed to rash to arms 3/3/23   Kesha Sanchez PA-C       Allergies   Allergen Reactions    Peanuts [Nuts] Hives     And the oil        REVIEW OF SYSTEMS:   5 point ROS negative except as noted above in HPI, including Gen., Resp., CV, GI &  system review.    PHYSICAL EXAM:   Pulse 67   Temp 97.8  F (36.6  C) (Oral)   Resp 16   Ht 1.702 m (5' 7\")   Wt 77.1 kg (170 lb)   BMI 26.63 kg/m   Estimated body mass index is 26.63 kg/m  as calculated from the following:    Height as of this encounter: 1.702 m (5' 7\").    Weight as of this encounter: 77.1 kg (170 lb).   Constitutional: Awake, alert, no acute distress.  Eyes: No scleral icterus.  Conjunctiva are without injection.  ENMT: Mucous membranes moist, dentition and gums are intact.   Neck: Soft, supple, trachea midline.    Endocrine: n/a   Lymphatic: There is no cervical, submandibularadenopathy.  Respiratory: " normal efforts on room air  Cardiovascular: extremities warm and well perfused  Abdomen: Non-distended, non-tender,  No masses,  Musculoskeletal: Full range of motion in the upper and lower extremities.    Skin: No skin rashes or lesions to inspection.  No petechia.    Neurologic: alerted and oriented 3x  Psychiatric: The patient's affect is not blunted and mood is appropriate.  DIAGNOSTICS:    Not indicated    IMPRESSION   ASA Class 2 - Mild systemic disease    PLAN:   Plan for Colonoscopy with possible biopsy, possible polypectomy. We discussed the risks, benefits and alternatives and the patient wished to proceed.  Patient is cleared for the above procedure.    The above has been forwarded to the consulting provider.    Trell Venegas MD on 8/9/2024 at 8:00 AM  [facility]

## 2024-08-09 NOTE — ANESTHESIA POSTPROCEDURE EVALUATION
Patient: Ed Jonathan    Procedure: Procedure(s):  COLONOSCOPY, FLEXIBLE, WITH LESION REMOVAL USING SNARE       Anesthesia Type:  General    Note:  Disposition: Outpatient   Postop Pain Control: Uneventful            Sign Out: Well controlled pain   PONV: No   Neuro/Psych: Uneventful            Sign Out: Acceptable/Baseline neuro status   Airway/Respiratory: Uneventful            Sign Out: Acceptable/Baseline resp. status   CV/Hemodynamics: Uneventful            Sign Out: Acceptable CV status; No obvious hypovolemia; No obvious fluid overload   Other NRE: NONE   DID A NON-ROUTINE EVENT OCCUR? No           Last vitals:  Vitals Value Taken Time   /86 08/09/24 0935   Temp 36.7  C (98.1  F) 08/09/24 0919   Pulse 79 08/09/24 0935   Resp 16 08/09/24 0935   SpO2 96 % 08/09/24 0935   Vitals shown include unfiled device data.    Electronically Signed By: OLGA Anaya CRNA  August 9, 2024  10:12 AM

## 2024-08-12 LAB
PATH REPORT.COMMENTS IMP SPEC: NORMAL
PATH REPORT.COMMENTS IMP SPEC: NORMAL
PATH REPORT.FINAL DX SPEC: NORMAL
PATH REPORT.GROSS SPEC: NORMAL
PATH REPORT.MICROSCOPIC SPEC OTHER STN: NORMAL
PATH REPORT.RELEVANT HX SPEC: NORMAL
PHOTO IMAGE: NORMAL

## 2024-10-01 PROBLEM — Z53.1 PROCEDURE AND TREATMENT NOT CARRIED OUT BECAUSE OF PATIENT'S DECISION FOR REASONS OF BELIEF AND GROUP PRESSURE: Status: ACTIVE | Noted: 2021-02-26

## 2024-11-08 ENCOUNTER — OFFICE VISIT (OUTPATIENT)
Dept: FAMILY MEDICINE | Facility: CLINIC | Age: 54
End: 2024-11-08
Payer: COMMERCIAL

## 2024-11-08 VITALS
WEIGHT: 172 LBS | HEART RATE: 82 BPM | SYSTOLIC BLOOD PRESSURE: 139 MMHG | RESPIRATION RATE: 14 BRPM | BODY MASS INDEX: 26.94 KG/M2 | TEMPERATURE: 97.3 F | DIASTOLIC BLOOD PRESSURE: 78 MMHG | OXYGEN SATURATION: 97 %

## 2024-11-08 DIAGNOSIS — J20.9 ACUTE BRONCHITIS WITH SYMPTOMS > 10 DAYS: Primary | ICD-10-CM

## 2024-11-08 DIAGNOSIS — J30.2 SEASONAL ALLERGIC RHINITIS, UNSPECIFIED TRIGGER: ICD-10-CM

## 2024-11-08 PROCEDURE — 99214 OFFICE O/P EST MOD 30 MIN: CPT | Performed by: NURSE PRACTITIONER

## 2024-11-08 RX ORDER — CODEINE PHOSPHATE AND GUAIFENESIN 10; 100 MG/5ML; MG/5ML
2 SOLUTION ORAL
Qty: 180 ML | Refills: 0 | Status: SHIPPED | OUTPATIENT
Start: 2024-11-08

## 2024-11-08 RX ORDER — DOXYCYCLINE 100 MG/1
100 CAPSULE ORAL 2 TIMES DAILY
Qty: 20 CAPSULE | Refills: 0 | Status: SHIPPED | OUTPATIENT
Start: 2024-11-08 | End: 2024-11-18

## 2024-11-08 RX ORDER — ALBUTEROL SULFATE 90 UG/1
2 INHALANT RESPIRATORY (INHALATION) EVERY 4 HOURS PRN
Qty: 18 G | Refills: 1 | Status: SHIPPED | OUTPATIENT
Start: 2024-11-08

## 2024-11-08 ASSESSMENT — PATIENT HEALTH QUESTIONNAIRE - PHQ9
SUM OF ALL RESPONSES TO PHQ QUESTIONS 1-9: 0
10. IF YOU CHECKED OFF ANY PROBLEMS, HOW DIFFICULT HAVE THESE PROBLEMS MADE IT FOR YOU TO DO YOUR WORK, TAKE CARE OF THINGS AT HOME, OR GET ALONG WITH OTHER PEOPLE: NOT DIFFICULT AT ALL
SUM OF ALL RESPONSES TO PHQ QUESTIONS 1-9: 0

## 2024-11-08 ASSESSMENT — PAIN SCALES - GENERAL: PAINLEVEL_OUTOF10: NO PAIN (0)

## 2024-11-08 ASSESSMENT — ENCOUNTER SYMPTOMS: COUGH: 1

## 2024-11-08 NOTE — PROGRESS NOTES
Assessment & Plan     Acute bronchitis with symptoms > 10 days  Symptomatic care strategies reviewed  Begin for cough at HS  - guaiFENesin-codeine (ROBITUSSIN AC) 100-10 MG/5ML solution; Take 10 mLs by mouth nightly as needed for cough.  Oral antibiotics SE reviewed.   - doxycycline hyclate (VIBRAMYCIN) 100 MG capsule; Take 1 capsule (100 mg) by mouth 2 times daily for 10 days.  For bronchospasm and cough during the day   - albuterol (PROAIR HFA/PROVENTIL HFA/VENTOLIN HFA) 108 (90 Base) MCG/ACT inhaler; Inhale 2 puffs into the lungs every 4 hours as needed for shortness of breath or wheezing.    Seasonal allergic rhinitis, unspecified trigger  Chronic and ongoing   Continue xyzal  - albuterol (PROAIR HFA/PROVENTIL HFA/VENTOLIN HFA) 108 (90 Base) MCG/ACT inhaler; Inhale 2 puffs into the lungs every 4 hours as needed for shortness of breath or wheezing.    History of asthma- no recent exacerbation  Seasonal allergies  Previous smoker. Consider CT Lung with ongoing symptoms.           Call or return to the clinic with any worsening of symptoms or no resolution. Patient/Parent verbalized understanding and is in agreement. Medication side effects reviewed.   Current Outpatient Medications   Medication Sig Dispense Refill    albuterol (PROAIR HFA/PROVENTIL HFA/VENTOLIN HFA) 108 (90 Base) MCG/ACT inhaler Inhale 2 puffs into the lungs every 4 hours as needed for shortness of breath or wheezing. 18 g 1    clindamycin (CLEOCIN T) 1 % external solution Apply twice daily to scalp. 60 mL 3    COMPOUNDED NON-CONTROLLED SUBSTANCE (CMPD RX) - PHARMACY TO MIX COMPOUNDED MEDICATION Nifedipine 0.3% ointment.  Apply to perianal and inside the anal canal twice a day for 4-6 weeks 60 g 1    doxycycline hyclate (VIBRAMYCIN) 100 MG capsule Take 1 capsule (100 mg) by mouth 2 times daily for 10 days. 20 capsule 0    EPINEPHrine (ANY BX GENERIC EQUIV) 0.3 MG/0.3ML injection 2-pack Inject 0.3 mLs (0.3 mg) into the muscle once as needed for  anaphylaxis 0.6 mL 0    fluocinonide (LIDEX) 0.05 % external cream Apply sparingly twice daily as needed to rash to arms 30 g 5    fluticasone (FLONASE) 50 MCG/ACT nasal spray Spray 1-2 sprays into both nostrils daily 16 mL 11    guaiFENesin-codeine (ROBITUSSIN AC) 100-10 MG/5ML solution Take 10 mLs by mouth nightly as needed for cough. 180 mL 0    levocetirizine (XYZAL) 5 MG tablet Take 1 tablet (5 mg) by mouth every evening 30 tablet 11     Chart documentation with Dragon Voice recognition Software. Although reviewed after completion, some words and grammatical errors may remain.  Qing Aguilera MSN,FNP-BC  [address] [address] [phone]            Subjective   Ed is a 54 year old, presenting for the following health issues:  Cough        11/8/2024    12:11 PM   Additional Questions   Roomed by Emma     History of Present Illness       Reason for visit:  Cough x2 weeks, blood in sputum, brochospasm like episodes that makea it hard to breathe, have been using OTC meds, albuterol inhaler.  Symptom onset:  1-2 weeks ago  Symptoms include:  Cough, bronchospasm like episodes  Symptom intensity:  Moderate  Symptom progression:  Worsening  Had these symptoms before:  No  What makes it worse:  Laying down  What makes it better:  Being upright and not talking   He is taking medications regularly.     Robitussin no relief  Dayquil during the day  Outside fine but laying down is tough with worsening cough   No sick contacts  Michelle wife not sick  Tessalon no relief  Some relief with albuterol use  Previous smoker years ago 20+ yrs   Small Red tinged sputum today may have been from cough drop red       has no past medical history of Arthritis, Cancer (H), Cerebral infarction (H), Congestive heart failure (H), COPD (chronic obstructive pulmonary disease) (H), Diabetes (H), Difficult intubation, Heart disease, History of blood transfusion, Hypertension, Malignant  hyperthermia, PONV (postoperative nausea and vomiting), Thyroid disease, or Uncomplicated asthma.  Past Surgical History:   Procedure Laterality Date    AMPUTATE FINGER(S) Left 2/2/2018    Procedure: AMPUTATE FINGER(S);  Left long finger revision amputation.;  Surgeon: Denise Lyle MD;  Location: [facility]    AMPUTATE FINGER(S)      ANTERIOR CRUCIATE LIGAMENT REPAIR      ARTHROTOMY SHOULDER, ROTATOR CUFF REPAIR, COMBINED Right 5/2/2016    Procedure: COMBINED ARTHROTOMY SHOULDER, ROTATOR CUFF REPAIR;  Surgeon: Rafael Pacheco MD;  Location: [facility]    ARTHROTOMY SHOULDER, SUBACROMIAL DECOMPRESSION, COMBINED Left 6/13/2016    Procedure: COMBINED ARTHROTOMY SHOULDER, SUBACROMIAL DECOMPRESSION;  Surgeon: Rafael Pacheco MD;  Location: [facility]    COLONOSCOPY N/A 8/9/2024    Procedure: COLONOSCOPY, FLEXIBLE, WITH LESION REMOVAL USING SNARE;  Surgeon: Trell Venegas MD;  Location: [facility]    DESTRUCTION OF PARAVERTEBRAL FACETCERVICAL / THORACIC SINGLE Right 8/25/2017    Procedure: DESTRUCTION OF PARAVERTEBRAL FACET CERVICAL / THORACIC SINGLE;  Right Radiofrequency Ablation of the Cervical 3rd occipital nerve, C3. 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     HC FULL THICK GRAFT HEAD/AXIL/GEN/HND/FT <=20 CM  8/31/2003    Full thickness skin graft to the donor site of the cross finger flap     HEMORRHOIDECTOMY EXTERNAL N/A 4/13/2021    Procedure: EXAM UNDER ANESTHESIA WITH HEMORRHOIDECTOMY X2;  Surgeon: Gwen Vargas MD;  Location: [facility];  Service: General    MS ANAL SPHINCTEROTOMY N/A 3/9/2021    Procedure: EXAM UNDER ANESTHESIA LATERAL INTERNAL SPHINCTEROTOMY;  Surgeon: Gwen Vargas MD;  Location: [facility];  Service: General    SHOULDER SURGERY Bilateral     SURGICAL HISTORY OF -   9/22/2003    Division of left cross  finger flap    SURGICAL HISTORY OF -   2003    left ACL repair and partial menisectomy        Allergies   Allergen Reactions    Peanuts [Nuts] Hives     And the oil                 Review of Systems  Constitutional, neuro, ENT, endocrine, pulmonary, cardiac, gastrointestinal, genitourinary, musculoskeletal, integument and psychiatric systems are negative, except as otherwise noted.      Objective    /78   Pulse 82   Temp 97.3  F (36.3  C) (Tympanic)   Resp 14   Wt 78 kg (172 lb)   SpO2 97%   BMI 26.94 kg/m    Body mass index is 26.94 kg/m .  Physical Exam   GENERAL: alert and no distress  EYES: Eyes grossly normal to inspection, PERRL and conjunctivae and sclerae normal  HENT: ear canals and TM's normal, nose and mouth without ulcers or lesions  NECK: no adenopathy, no asymmetry, masses, or scars  RESP: rhonchi L upper anterior, L upper posterior, and L lower posterior  CV: regular rate and rhythm, normal S1 S2, no S3 or S4, no murmur, click or rub, no peripheral edema  ABDOMEN: soft, nontender, no hepatosplenomegaly, no masses and bowel sounds normal  MS: no gross musculoskeletal defects noted, no edema  SKIN: no suspicious lesions or rashes  NEURO: Normal strength and tone, mentation intact and speech normal  PSYCH: mentation appears normal, affect normal/bright            Signed Electronically by: OLGA Hi CNP

## 2024-11-19 ENCOUNTER — ALLIED HEALTH/NURSE VISIT (OUTPATIENT)
Dept: FAMILY MEDICINE | Facility: CLINIC | Age: 54
End: 2024-11-19
Payer: COMMERCIAL

## 2024-11-19 ENCOUNTER — LAB (OUTPATIENT)
Dept: LAB | Facility: CLINIC | Age: 54
End: 2024-11-19
Payer: COMMERCIAL

## 2024-11-19 ENCOUNTER — ANCILLARY PROCEDURE (OUTPATIENT)
Dept: GENERAL RADIOLOGY | Facility: CLINIC | Age: 54
End: 2024-11-19
Attending: NURSE PRACTITIONER
Payer: COMMERCIAL

## 2024-11-19 ENCOUNTER — VIRTUAL VISIT (OUTPATIENT)
Dept: FAMILY MEDICINE | Facility: CLINIC | Age: 54
End: 2024-11-19
Payer: COMMERCIAL

## 2024-11-19 DIAGNOSIS — J20.9 ACUTE BRONCHITIS WITH SYMPTOMS > 10 DAYS: Primary | ICD-10-CM

## 2024-11-19 DIAGNOSIS — J20.9 ACUTE BRONCHITIS WITH SYMPTOMS > 10 DAYS: ICD-10-CM

## 2024-11-19 LAB
BASOPHILS # BLD AUTO: 0.1 10E3/UL (ref 0–0.2)
BASOPHILS NFR BLD AUTO: 1 %
EOSINOPHIL # BLD AUTO: 0.2 10E3/UL (ref 0–0.7)
EOSINOPHIL NFR BLD AUTO: 1 %
ERYTHROCYTE [DISTWIDTH] IN BLOOD BY AUTOMATED COUNT: 11.7 % (ref 10–15)
HCT VFR BLD AUTO: 47.6 % (ref 40–53)
HGB BLD-MCNC: 16.3 G/DL (ref 13.3–17.7)
IMM GRANULOCYTES # BLD: 0 10E3/UL
IMM GRANULOCYTES NFR BLD: 0 %
LYMPHOCYTES # BLD AUTO: 2.7 10E3/UL (ref 0.8–5.3)
LYMPHOCYTES NFR BLD AUTO: 21 %
MCH RBC QN AUTO: 30.1 PG (ref 26.5–33)
MCHC RBC AUTO-ENTMCNC: 34.2 G/DL (ref 31.5–36.5)
MCV RBC AUTO: 88 FL (ref 78–100)
MONOCYTES # BLD AUTO: 0.7 10E3/UL (ref 0–1.3)
MONOCYTES NFR BLD AUTO: 5 %
NEUTROPHILS # BLD AUTO: 9.3 10E3/UL (ref 1.6–8.3)
NEUTROPHILS NFR BLD AUTO: 72 %
PLATELET # BLD AUTO: 217 10E3/UL (ref 150–450)
RBC # BLD AUTO: 5.41 10E6/UL (ref 4.4–5.9)
WBC # BLD AUTO: 12.9 10E3/UL (ref 4–11)

## 2024-11-19 PROCEDURE — 99214 OFFICE O/P EST MOD 30 MIN: CPT | Mod: 95 | Performed by: NURSE PRACTITIONER

## 2024-11-19 PROCEDURE — 87798 DETECT AGENT NOS DNA AMP: CPT

## 2024-11-19 PROCEDURE — 99207 PR NO CHARGE NURSE ONLY: CPT

## 2024-11-19 PROCEDURE — 85025 COMPLETE CBC W/AUTO DIFF WBC: CPT

## 2024-11-19 PROCEDURE — 36415 COLL VENOUS BLD VENIPUNCTURE: CPT

## 2024-11-19 PROCEDURE — 71046 X-RAY EXAM CHEST 2 VIEWS: CPT | Mod: TC | Performed by: RADIOLOGY

## 2024-11-19 RX ORDER — AZITHROMYCIN 250 MG/1
TABLET, FILM COATED ORAL
Qty: 6 TABLET | Refills: 0 | Status: SHIPPED | OUTPATIENT
Start: 2024-11-19 | End: 2024-11-24

## 2024-11-19 RX ORDER — PREDNISONE 20 MG/1
40 TABLET ORAL DAILY
Qty: 10 TABLET | Refills: 0 | Status: SHIPPED | OUTPATIENT
Start: 2024-11-19 | End: 2024-11-24

## 2024-11-19 ASSESSMENT — ENCOUNTER SYMPTOMS: COUGH: 1

## 2024-11-19 NOTE — PROGRESS NOTES
Ed is a 54 year old who is being evaluated via a billable video visit.    How would you like to obtain your AVS? MyChart  If the video visit is dropped, the invitation should be resent by: Text to cell phone: [phone]  Will anyone else be joining your video visit? No      Assessment & Plan     Acute bronchitis with symptoms > 10 days  - XR Chest 2 Views; Future  - CBC with platelets and differential; Future  - B. pertussis/parapertussis PCR-NP  - azithromycin (ZITHROMAX) 250 MG tablet; Take 2 tablets (500 mg) by mouth daily for 1 day, THEN 1 tablet (250 mg) daily for 4 days.  - predniSONE (DELTASONE) 20 MG tablet; Take 2 tablets (40 mg) by mouth daily for 5 days. Take in morning with food starting 11/20    Will try to get lab and xray done today in [place]  Call or return to the clinic with any worsening of symptoms or no resolution. Patient/Parent verbalized understanding and is in agreement. Medication side effects reviewed.   Current Outpatient Medications   Medication Sig Dispense Refill    albuterol (PROAIR HFA/PROVENTIL HFA/VENTOLIN HFA) 108 (90 Base) MCG/ACT inhaler Inhale 2 puffs into the lungs every 4 hours as needed for shortness of breath or wheezing. 18 g 1    azithromycin (ZITHROMAX) 250 MG tablet Take 2 tablets (500 mg) by mouth daily for 1 day, THEN 1 tablet (250 mg) daily for 4 days. 6 tablet 0    clindamycin (CLEOCIN T) 1 % external solution Apply twice daily to scalp. 60 mL 3    COMPOUNDED NON-CONTROLLED SUBSTANCE (CMPD RX) - PHARMACY TO MIX COMPOUNDED MEDICATION Nifedipine 0.3% ointment.  Apply to perianal and inside the anal canal twice a day for 4-6 weeks 60 g 1    EPINEPHrine (ANY BX GENERIC EQUIV) 0.3 MG/0.3ML injection 2-pack Inject 0.3 mLs (0.3 mg) into the muscle once as needed for anaphylaxis 0.6 mL 0    fluocinonide (LIDEX) 0.05 % external cream Apply sparingly twice daily as needed to rash to arms 30 g 5    fluticasone (FLONASE) 50 MCG/ACT nasal spray Spray 1-2 sprays into both  nostrils daily 16 mL 11    guaiFENesin-codeine (ROBITUSSIN AC) 100-10 MG/5ML solution Take 10 mLs by mouth nightly as needed for cough. 180 mL 0    levocetirizine (XYZAL) 5 MG tablet Take 1 tablet (5 mg) by mouth every evening 30 tablet 11    predniSONE (DELTASONE) 20 MG tablet Take 2 tablets (40 mg) by mouth daily for 5 days. Take in morning with food starting 11/20 10 tablet 0     Chart documentation with Dragon Voice recognition Software. Although reviewed after completion, some words and grammatical errors may remain.  Qing Aguilera MSN,FNP-[facility]  [address] [place] [id number]  [phone]          Subjective   Ed is a 54 year old, presenting for the following health issues:  Cough        11/8/2024    12:11 PM   Additional Questions   Roomed by Emma     Cough       Follow up Bronchitis   Completed Doxycycline   Symptoms came back yesterday   Cough wheezing, sinus pressure   Last dose on Sunday was feeling better. Came back in full force   Wheezing gone  during the day . Itching in the throat   Clear drainage   Fever .. Sweat and cold chills   No N/V but didn't eat and had GI upset one day  Wheezing at night per wife  Codeine in cough medicine gave him severe headaches so went back to OTC   2 Friends were ill and tested + for covid. He tested negative.       Review of Systems  Constitutional, neuro, ENT, endocrine, pulmonary, cardiac, gastrointestinal, genitourinary, musculoskeletal, integument and psychiatric systems are negative, except as otherwise noted.      Objective         Vitals:  No vitals were obtained today due to virtual visit.    Physical Exam   GENERAL: alert and no distress  EYES: Eyes grossly normal to inspection.  No discharge or erythema, or obvious scleral/conjunctival abnormalities.  RESP: wet cough   SKIN: Visible skin clear. No significant rash, abnormal pigmentation or lesions.  NEURO: Cranial nerves grossly intact.  Mentation and speech  appropriate for age.  PSYCH: Appropriate affect, tone, and pace of words    Lab and xray pending       Video-Visit Details    Type of service:  Video Visit   Originating Location (pt. Location): Other car    Distant Location (provider location):  Off-site  Platform used for Video Visit: Oswaldo  Signed Electronically by: OLGA Hi CNP

## 2024-11-19 NOTE — PROGRESS NOTES
Patient presents for bordetella pertussis swab.  Tolerated well.  Will receive results via [employer]. Bailee Kahler on 11/19/2024 at 8:44 AM

## 2024-11-20 LAB
B PARAPERT DNA SPEC QL NAA+PROBE: NOT DETECTED
B PERT DNA SPEC QL NAA+PROBE: NOT DETECTED

## 2025-05-08 ENCOUNTER — PATIENT OUTREACH (OUTPATIENT)
Dept: CARE COORDINATION | Facility: CLINIC | Age: 55
End: 2025-05-08
Payer: COMMERCIAL

## 2025-07-03 ENCOUNTER — NURSE TRIAGE (OUTPATIENT)
Dept: NURSING | Facility: CLINIC | Age: 55
End: 2025-07-03
Payer: COMMERCIAL

## 2025-07-03 NOTE — TELEPHONE ENCOUNTER
"Spouse Michelle is calling and Ed gave consent to communicate with Michelle.  Michelle states that a branch fell on his head and Ed has a cut about two inches long and 1/4 to 1/2 inch deep.  Michelle feels that Ed will need sutures.        Reason for Disposition   Skin is split open or gaping  (or length > 1/2 inch or 12 mm)    Additional Information   Negative: [1] ACUTE NEURO SYMPTOM AND [2] present now  (DEFINITION: difficult to awaken OR confused thinking and talking OR slurred speech OR weakness of arms OR unsteady walking)   Negative: Knocked out (unconscious) > 1 minute   Negative: Seizure (convulsion) occurred  (Exception: Prior history of seizures and now alert and without Acute Neuro Symptoms.)   Negative: Penetrating head injury (e.g., knife, gun shot wound, metal object)   Negative: [1] Major bleeding (e.g., actively dripping or spurting) AND [2] can't be stopped   Negative: [1] Dangerous mechanism of injury (e.g., MVA, diving, trampoline, contact sports, fall > 10 feet or 3 meters) AND [2] NECK pain AND [3] began < 1 hour after injury   Negative: Sounds like a life-threatening emergency to the triager   Negative: [1] Diagnosed with concussion AND [2] within last 14 days   Negative: [1] Traumatic brain injury (mTBI; concussion) AND [2] more than 14 days since head injury   Negative: Can't remember what happened (amnesia)   Negative: Vomiting once or more   Negative: [1] Loss of vision or double vision AND [2] present now   Negative: Watery or blood-tinged fluid dripping from the NOSE or EARS now  (Exception: Tears from crying or nosebleed from nasal trauma.)   Negative: [1] One or two \"black eyes\" (bruising, purple color of eyelids) AND [2] onset within 24 hours of head injury   Negative: Large swelling or bruise > 2 inches (5 cm)    Protocols used: Head Injury-A-AH    "

## 2025-07-26 ENCOUNTER — HEALTH MAINTENANCE LETTER (OUTPATIENT)
Age: 55
End: 2025-07-26

## (undated) DEVICE — ENDO SNARE EXACTO COLD 9MM LOOP 2.4MMX230CM 00711115

## (undated) DEVICE — GLOVE PROTEXIS W/NEU-THERA 7.0  2D73TE70

## (undated) DEVICE — SYR 10ML LL W/O NDL

## (undated) DEVICE — GOWN IMPERVIOUS SPECIALTY XLG/XLONG 32474

## (undated) DEVICE — DRSG GAUZE 4X4" 2187

## (undated) DEVICE — DRAPE BACK TABLE  44X90" 8377

## (undated) DEVICE — SYR 03ML LL W/O NDL 309657

## (undated) DEVICE — PEN MARKING SKIN TYCO DEVON DUAL TIP 31145868

## (undated) DEVICE — PREP CHLORHEXIDINE 4% 4OZ (HIBICLENS) 57504

## (undated) DEVICE — PACK HAND

## (undated) DEVICE — NDL COUNTER 20CT 31142493

## (undated) DEVICE — PREP CHLORAPREP W/ORANGE TINT 10.5ML 260715

## (undated) DEVICE — LINEN TOWEL PACK X5 5464

## (undated) DEVICE — BNDG COBAN 2"X5YDS UNSTERILE 2082

## (undated) DEVICE — SYR 05ML LL W/O NDL

## (undated) DEVICE — SPONGE RAY-TEC 4X8" 7318

## (undated) DEVICE — GLOVE PROTEXIS BLUE W/NEU-THERA 7.5  2D73EB75

## (undated) DEVICE — SU ETHILON 4-0 P-3 18" BLACK 699G

## (undated) DEVICE — DRAPE C-ARM MINI 110788

## (undated) DEVICE — BNDG ELASTIC 4"X5YDS UNSTERILE 6611-40

## (undated) DEVICE — GLOVE PROTEXIS POWDER FREE SMT 7.5  2D72PT75X

## (undated) DEVICE — NDL 25GA 2"  8881200441

## (undated) DEVICE — CAST PADDING 4" STERILE 9044S

## (undated) DEVICE — NDL BLUNT 18GA 1.5" W/O FILTER 305180

## (undated) DEVICE — PREP SKIN SCRUB TRAY 4461A

## (undated) DEVICE — DRSG ADAPTIC 3X3" 6112

## (undated) DEVICE — BLADE KNIFE BEAVER 376700

## (undated) DEVICE — GLOVE ESTEEM POWDER FREE SMT 7.0  2D72PT70

## (undated) DEVICE — SOL NACL 0.9% IRRIG 1000ML BOTTLE 07138-09

## (undated) DEVICE — CANNULA MONOPOLAR CVD 100ML 20GA 0406-630-125

## (undated) DEVICE — DRAPE SHEET REV FOLD 3/4 9349

## (undated) DEVICE — SOL WATER IRRIG 1000ML BOTTLE 07139-09

## (undated) RX ORDER — BUPIVACAINE HYDROCHLORIDE 7.5 MG/ML
INJECTION, SOLUTION EPIDURAL; RETROBULBAR
Status: DISPENSED
Start: 2017-05-15

## (undated) RX ORDER — FENTANYL CITRATE 50 UG/ML
INJECTION, SOLUTION INTRAMUSCULAR; INTRAVENOUS
Status: DISPENSED
Start: 2018-02-02

## (undated) RX ORDER — BUPIVACAINE HYDROCHLORIDE 5 MG/ML
INJECTION, SOLUTION PERINEURAL
Status: DISPENSED
Start: 2018-02-02

## (undated) RX ORDER — LIDOCAINE HYDROCHLORIDE 10 MG/ML
INJECTION, SOLUTION EPIDURAL; INFILTRATION; INTRACAUDAL; PERINEURAL
Status: DISPENSED
Start: 2018-02-02

## (undated) RX ORDER — DEXAMETHASONE SODIUM PHOSPHATE 10 MG/ML
INJECTION, SOLUTION INTRAMUSCULAR; INTRAVENOUS
Status: DISPENSED
Start: 2017-05-15

## (undated) RX ORDER — GINSENG 100 MG
CAPSULE ORAL
Status: DISPENSED
Start: 2018-02-02

## (undated) RX ORDER — FENTANYL CITRATE 50 UG/ML
INJECTION, SOLUTION INTRAMUSCULAR; INTRAVENOUS
Status: DISPENSED
Start: 2017-08-25

## (undated) RX ORDER — LIDOCAINE HYDROCHLORIDE 10 MG/ML
INJECTION, SOLUTION EPIDURAL; INFILTRATION; INTRACAUDAL; PERINEURAL
Status: DISPENSED
Start: 2018-02-01

## (undated) RX ORDER — LIDOCAINE HYDROCHLORIDE 10 MG/ML
INJECTION, SOLUTION EPIDURAL; INFILTRATION; INTRACAUDAL; PERINEURAL
Status: DISPENSED
Start: 2024-08-09

## (undated) RX ORDER — GLYCOPYRROLATE 0.2 MG/ML
INJECTION, SOLUTION INTRAMUSCULAR; INTRAVENOUS
Status: DISPENSED
Start: 2017-08-25

## (undated) RX ORDER — CELECOXIB 200 MG/1
CAPSULE ORAL
Status: DISPENSED
Start: 2018-02-02

## (undated) RX ORDER — ACETAMINOPHEN 325 MG/1
TABLET ORAL
Status: DISPENSED
Start: 2018-02-02

## (undated) RX ORDER — PROPOFOL 10 MG/ML
INJECTION, EMULSION INTRAVENOUS
Status: DISPENSED
Start: 2018-02-02

## (undated) RX ORDER — LIDOCAINE HYDROCHLORIDE 10 MG/ML
INJECTION, SOLUTION INFILTRATION; PERINEURAL
Status: DISPENSED
Start: 2018-02-02